# Patient Record
Sex: FEMALE | Race: WHITE | Employment: OTHER | URBAN - METROPOLITAN AREA
[De-identification: names, ages, dates, MRNs, and addresses within clinical notes are randomized per-mention and may not be internally consistent; named-entity substitution may affect disease eponyms.]

---

## 2017-07-22 PROBLEM — K55.1 SUPERIOR MESENTERIC ARTERY STENOSIS (HCC): Status: ACTIVE | Noted: 2017-07-22

## 2017-07-22 PROBLEM — K44.9 HIATAL HERNIA: Status: ACTIVE | Noted: 2017-07-22

## 2017-07-22 PROBLEM — E87.1 HYPONATREMIA: Status: ACTIVE | Noted: 2017-07-22

## 2017-07-22 PROBLEM — E07.9 THYROID DISEASE: Status: ACTIVE | Noted: 2017-07-22

## 2017-07-22 PROBLEM — H35.30 MACULAR DEGENERATION OF LEFT EYE: Status: ACTIVE | Noted: 2017-07-22

## 2017-07-22 PROBLEM — K57.92 DIVERTICULITIS: Status: ACTIVE | Noted: 2017-07-22

## 2017-07-22 PROBLEM — R35.0 URINARY FREQUENCY: Status: ACTIVE | Noted: 2017-07-22

## 2020-05-04 ENCOUNTER — APPOINTMENT (OUTPATIENT)
Dept: CT IMAGING | Age: 85
End: 2020-05-04
Payer: MEDICARE

## 2020-05-04 ENCOUNTER — HOSPITAL ENCOUNTER (EMERGENCY)
Age: 85
Discharge: HOME | End: 2020-05-05
Attending: EMERGENCY MEDICINE
Payer: MEDICARE

## 2020-05-04 VITALS
OXYGEN SATURATION: 98 % | BODY MASS INDEX: 25.72 KG/M2 | WEIGHT: 131 LBS | TEMPERATURE: 98.4 F | HEART RATE: 90 BPM | SYSTOLIC BLOOD PRESSURE: 166 MMHG | RESPIRATION RATE: 14 BRPM | HEIGHT: 60 IN | DIASTOLIC BLOOD PRESSURE: 81 MMHG

## 2020-05-04 PROCEDURE — 72125 CT NECK SPINE W/O DYE: CPT

## 2020-05-04 PROCEDURE — 90471 IMMUNIZATION ADMIN: CPT | Performed by: EMERGENCY MEDICINE

## 2020-05-04 PROCEDURE — 99283 EMERGENCY DEPT VISIT LOW MDM: CPT

## 2020-05-04 PROCEDURE — 70450 CT HEAD/BRAIN W/O DYE: CPT

## 2020-05-04 PROCEDURE — 90715 TDAP VACCINE 7 YRS/> IM: CPT | Performed by: EMERGENCY MEDICINE

## 2020-05-04 PROCEDURE — 6360000002 HC RX W HCPCS: Performed by: EMERGENCY MEDICINE

## 2020-05-04 RX ORDER — GINSENG 100 MG
CAPSULE ORAL ONCE
Status: DISCONTINUED | OUTPATIENT
Start: 2020-05-04 | End: 2020-05-05 | Stop reason: HOSPADM

## 2020-05-04 RX ADMIN — TETANUS TOXOID, REDUCED DIPHTHERIA TOXOID AND ACELLULAR PERTUSSIS VACCINE, ADSORBED 0.5 ML: 5; 2.5; 8; 8; 2.5 SUSPENSION INTRAMUSCULAR at 22:03

## 2020-05-04 ASSESSMENT — ENCOUNTER SYMPTOMS
SHORTNESS OF BREATH: 0
COUGH: 0
BACK PAIN: 0
EYE PAIN: 0
NAUSEA: 0
ABDOMINAL PAIN: 0
VOMITING: 0
CHEST TIGHTNESS: 0

## 2020-05-04 ASSESSMENT — PAIN DESCRIPTION - LOCATION: LOCATION: HEAD

## 2020-05-04 ASSESSMENT — PAIN SCALES - GENERAL: PAINLEVEL_OUTOF10: 6

## 2020-05-05 NOTE — ED NOTES
[facility] here to transport pt back to [facility].      Timmy Orlando RN  05/05/20 1527

## 2020-05-05 NOTE — ED NOTES
Discharge instructions reviewed without questions. No further needs voiced at this time. Patient discharged from ED to [facility] with [facility] in stable condition.        Doris Shaikh RN  05/05/20 0020       Doris Shaikh RN  05/05/20 0020

## 2020-05-05 NOTE — ED PROVIDER NOTES
Negative for pain and visual disturbance. Respiratory: Negative for cough, chest tightness and shortness of breath. Cardiovascular: Negative for chest pain. Gastrointestinal: Negative for abdominal pain, nausea and vomiting. Genitourinary: Negative for flank pain. Musculoskeletal: Negative for back pain, gait problem, neck pain and neck stiffness. Skin: Positive for wound (scalp only). Neurological: Positive for dizziness (chronic, no new changes today) and headaches (mild at site of trauma only). Negative for syncope, weakness, light-headedness and numbness. Psychiatric/Behavioral: Negative for confusion. Positives and Pertinent negatives as per HPI. PASTMEDICAL HISTORY     Past Medical History:   Diagnosis Date    Thyroid disease     TIA (transient ischemic attack) 1980's         SURGICAL HISTORY       Past Surgical History:   Procedure Laterality Date    CATARACT REMOVAL      OU    HYSTERECTOMY           CURRENT MEDICATIONS       Current Discharge Medication List      CONTINUE these medications which have NOT CHANGED    Details   Multiple Vitamins-Minerals (MULTIVITAMIN ADULTS PO) Take by mouth      levothyroxine (SYNTHROID) 100 MCG tablet Take 100 mcg by mouth every morning (before breakfast)             ALLERGIES     Sulfa antibiotics    FAMILY HISTORY       Family History   Problem Relation Age of Onset    Arthritis Mother     Hearing Loss Father     Arthritis Sister           SOCIAL HISTORY       Social History     Socioeconomic History    Marital status:      Spouse name: None    Number of children: None    Years of education: None    Highest education level: None   Occupational History    None   Social Needs    Financial resource strain: None    Food insecurity     Worry: None     Inability: None    Transportation needs     Medical: None     Non-medical: None   Tobacco Use    Smoking status: Never Smoker   Substance and Sexual Activity    Alcohol use:  No palpation of all joints      Skin:     General: Skin is warm and dry. Coloration: Skin is not jaundiced or pale. Findings: No bruising, erythema, lesion or rash. Neurological:      General: No focal deficit present. Mental Status: She is alert and oriented to person, place, and time. Mental status is at baseline. GCS: GCS eye subscore is 4. GCS verbal subscore is 5. GCS motor subscore is 6. Cranial Nerves: Cranial nerves are intact. No cranial nerve deficit, dysarthria or facial asymmetry. Sensory: Sensation is intact. No sensory deficit. Motor: Motor function is intact. No weakness, tremor, atrophy, abnormal muscle tone or seizure activity. Comments: Normal  strength bilaterally at 5 out of 5, normal leg extension and dorsiflexion and plantar flexion bilaterally with strength at 5 out of 5   Psychiatric:         Attention and Perception: Attention normal.         Mood and Affect: Mood normal.         Speech: Speech normal.         Behavior: Behavior normal. Behavior is cooperative. DIAGNOSTIC RESULTS   :    Labs Reviewed - No data to display    All other labs were within normal range or not returned asof this dictation. EKG: All EKG's are interpreted by the Emergency Department Physician who either signs or Co-signs this chart in the absence of a cardiologist.        RADIOLOGY:   Non-plain film images such as CT, Ultrasound and MRI are read by the radiologist. Plainradiographic images are visualized and preliminarily interpreted by the  ED Provider with the belowfindings:        Interpretation per the Radiologist below, if available at the time of this note:    CT Head WO Contrast   Final Result   1. No acute intracranial abnormality. 2. Left parietal scalp hematoma. No underlying fracture. 3. Diffuse parenchymal volume loss with moderate chronic white matter   microvascular ischemic changes.          CT Cervical Spine WO Contrast   Final Result   No acute osseous abnormality of the cervical spine. PROCEDURES   Unless otherwise noted below, none     Procedures    CRITICAL CARE TIME   N/A    CONSULTS:  None    EMERGENCY DEPARTMENT COURSE and DIFFERENTIAL DIAGNOSIS/MDM:   Vitals:    Vitals:    05/04/20 1958   BP: (!) 173/89   Pulse: 86   Resp: 16   Temp: 98.4 °F (36.9 °C)   TempSrc: Oral   SpO2: 98%   Weight: 131 lb (59.4 kg)   Height: 5' (1.524 m)       Patient was given the following medications:  Medications   bacitracin ointment (has no administration in time range)   Tetanus-Diphth-Acell Pertussis (BOOSTRIX) injection 0.5 mL (0.5 mLs Intramuscular Given 5/4/20 2203)     Patient was evaluated due to concern for mechanical fall after tripping and subsequently having trauma to her scalp. She has a mild headache at this time mainly at the site of her contusion. CT of the head and cervical spine did not show any intracranial hemorrhage or C-spine fracture. She denied any chest pain or shortness of breath but has had some heartburn over the last 2 weeks although she is aware it could be a cardiac etiology causing this and she states she is not worried about the heartburn and does not want this worked up at this time and just wants to make sure her head is okay. She understands if it was a cardiac issue it could cause severe problems but she is not worried about this and is willing to accept this risk. She has full mental capacity to make her own medical decisions. After her scalp was cleaned up, there was an abrasion noted but no laceration that needed repair and it stopped bleeding. Her Tdap was updated. Otherwise, she was ambulatory without difficulty. She knows to return to the emergency department if she develops any severe headache, vomiting, confusion, or would like her chest discomfort worked up for further evaluation, but otherwise follow-up with primary doctor as needed over the next few days.   She was well-appearing and in no acute distress at time of discharge and felt comfortable with this plan. The patient tolerated their visit well. The patient and / or the family were informed of the results of any tests, a time was given to answer questions. FINAL IMPRESSION      1. Fall on same level from slipping, tripping or stumbling, initial encounter    2. Contusion of scalp, initial encounter    3.  Need for Tdap vaccination          DISPOSITION/PLAN   DISPOSITION Decision To Discharge 05/04/2020 10:03:21 PM      PATIENT REFERRED TO:  [facility]  ED  [address] [facility] [address] [phone]  Go to   If symptoms worsen    Sharda Guerrero MD  [address] [place] [address]    Schedule an appointment as soon as possible for a visit in 3 days        DISCHARGEMEDICATIONS:  Current Discharge Medication List          DISCONTINUED MEDICATIONS:  Current Discharge Medication List                 (Please note that portions of this note were completed with a voicerecognition program.  Efforts were made to edit the dictations but occasionally words are mis-transcribed.)    Bhumi Hays MD (electronically signed)            Bhumi Hays MD  05/04/20 9665

## 2020-05-05 NOTE — ED NOTES
Patient assisted to [facility], unable to go at this time. To CT via stretcher.      Doris Shaikh RN  05/04/20 2052

## 2021-01-06 ENCOUNTER — HOSPITAL ENCOUNTER (EMERGENCY)
Age: 86
Discharge: HOME | End: 2021-01-06
Payer: MEDICARE

## 2021-01-06 VITALS
SYSTOLIC BLOOD PRESSURE: 109 MMHG | WEIGHT: 131 LBS | BODY MASS INDEX: 21.83 KG/M2 | TEMPERATURE: 97.1 F | HEIGHT: 65 IN | HEART RATE: 89 BPM | RESPIRATION RATE: 18 BRPM | OXYGEN SATURATION: 100 % | DIASTOLIC BLOOD PRESSURE: 106 MMHG

## 2021-01-06 DIAGNOSIS — R39.15 URINARY URGENCY: Primary | ICD-10-CM

## 2021-01-06 LAB
A/G RATIO: 1.3 (ref 1.1–2.2)
ALBUMIN SERPL-MCNC: 4.3 G/DL (ref 3.4–5)
ALP BLD-CCNC: 113 U/L (ref 40–129)
ALT SERPL-CCNC: 11 U/L (ref 10–40)
ANION GAP SERPL CALCULATED.3IONS-SCNC: 12 MMOL/L (ref 3–16)
AST SERPL-CCNC: 20 U/L (ref 15–37)
BASOPHILS ABSOLUTE: 0 K/UL (ref 0–0.2)
BASOPHILS RELATIVE PERCENT: 0.7 %
BILIRUB SERPL-MCNC: 0.3 MG/DL (ref 0–1)
BILIRUBIN URINE: NEGATIVE
BLOOD, URINE: NEGATIVE
BUN BLDV-MCNC: 27 MG/DL (ref 7–20)
CALCIUM SERPL-MCNC: 9.5 MG/DL (ref 8.3–10.6)
CHLORIDE BLD-SCNC: 99 MMOL/L (ref 99–110)
CLARITY: CLEAR
CO2: 23 MMOL/L (ref 21–32)
COLOR: YELLOW
CREAT SERPL-MCNC: 0.8 MG/DL (ref 0.6–1.2)
EOSINOPHILS ABSOLUTE: 0.2 K/UL (ref 0–0.6)
EOSINOPHILS RELATIVE PERCENT: 4 %
GFR AFRICAN AMERICAN: >60
GFR NON-AFRICAN AMERICAN: >60
GLOBULIN: 3.3 G/DL
GLUCOSE BLD-MCNC: 89 MG/DL (ref 70–99)
GLUCOSE URINE: NEGATIVE MG/DL
HCT VFR BLD CALC: 39.3 % (ref 36–48)
HEMOGLOBIN: 12.8 G/DL (ref 12–16)
KETONES, URINE: NEGATIVE MG/DL
LEUKOCYTE ESTERASE, URINE: NEGATIVE
LYMPHOCYTES ABSOLUTE: 0.6 K/UL (ref 1–5.1)
LYMPHOCYTES RELATIVE PERCENT: 14.8 %
MCH RBC QN AUTO: 31.9 PG (ref 26–34)
MCHC RBC AUTO-ENTMCNC: 32.6 G/DL (ref 31–36)
MCV RBC AUTO: 98 FL (ref 80–100)
MICROSCOPIC EXAMINATION: NORMAL
MONOCYTES ABSOLUTE: 0.4 K/UL (ref 0–1.3)
MONOCYTES RELATIVE PERCENT: 8.9 %
NEUTROPHILS ABSOLUTE: 2.9 K/UL (ref 1.7–7.7)
NEUTROPHILS RELATIVE PERCENT: 71.6 %
NITRITE, URINE: NEGATIVE
PDW BLD-RTO: 14.7 % (ref 12.4–15.4)
PH UA: 6 (ref 5–8)
PLATELET # BLD: 201 K/UL (ref 135–450)
PMV BLD AUTO: 8 FL (ref 5–10.5)
POTASSIUM SERPL-SCNC: 4.4 MMOL/L (ref 3.5–5.1)
PROTEIN UA: NEGATIVE MG/DL
RBC # BLD: 4.01 M/UL (ref 4–5.2)
SODIUM BLD-SCNC: 134 MMOL/L (ref 136–145)
SPECIFIC GRAVITY UA: 1.01 (ref 1–1.03)
TOTAL PROTEIN: 7.6 G/DL (ref 6.4–8.2)
URINE REFLEX TO CULTURE: NORMAL
URINE TYPE: NORMAL
UROBILINOGEN, URINE: 0.2 E.U./DL
WBC # BLD: 4.1 K/UL (ref 4–11)

## 2021-01-06 PROCEDURE — 81003 URINALYSIS AUTO W/O SCOPE: CPT

## 2021-01-06 PROCEDURE — 80053 COMPREHEN METABOLIC PANEL: CPT

## 2021-01-06 PROCEDURE — 51701 INSERT BLADDER CATHETER: CPT

## 2021-01-06 PROCEDURE — 99283 EMERGENCY DEPT VISIT LOW MDM: CPT

## 2021-01-06 PROCEDURE — 85025 COMPLETE CBC W/AUTO DIFF WBC: CPT

## 2021-01-06 PROCEDURE — 51798 US URINE CAPACITY MEASURE: CPT

## 2021-01-06 NOTE — ED NOTES
Conducted a bladder scan to determine volume of urine being retained by the pt. Bladder scan revealed approx 305 mL of urine being retained. Pt was placed on a bed pan, however was unable to urinate. Proceeded to insert a straight catheter using sterile technique in order to obtain a clean urine sample as well as drain the bladder to relieve pt of pressure. At this point the catheter emptied 300 mL of urine. Post-void bladder scan revealed 0 mL of urine in the pt's bladder.       Heriberto Scanlon  01/06/21 9059

## 2021-01-06 NOTE — ED PROVIDER NOTES
Relationship status: Not on file    Intimate partner violence     Fear of current or ex partner: Not on file     Emotionally abused: Not on file     Physically abused: Not on file     Forced sexual activity: Not on file   Other Topics Concern    Not on file   Social History Narrative    Not on file     No current facility-administered medications for this encounter. Current Outpatient Medications   Medication Sig Dispense Refill    Multiple Vitamins-Minerals (MULTIVITAMIN ADULTS PO) Take by mouth      levothyroxine (SYNTHROID) 100 MCG tablet Take 100 mcg by mouth every morning (before breakfast)       Allergies   Allergen Reactions    Sulfa Antibiotics Rash       REVIEW OF SYSTEMS  10 systems reviewed, pertinent positives per HPI otherwise noted to be negative    PHYSICAL EXAM  BP (!) 180/78   Pulse 90   Temp 97.1 °F (36.2 °C) (Oral)   Resp 18   Ht 5' 5\" (1.651 m)   Wt 131 lb (59.4 kg)   SpO2 97%   BMI 21.80 kg/m²   GENERAL APPEARANCE: Awake and alert. Cooperative. No acute distress. Vital signs are stable. Well appearing and non toxic. HEAD: Normocephalic. Atraumatic. EYES: PERRL. EOM's grossly intact. ENT: Mucous membranes are moist.   NECK: Supple. Normal ROM. HEART: RRR. Distal pulses are equal and intact. Cap refill less than 2 seconds. LUNGS: Respirations unlabored. CTAB. Good air exchange. Speaking comfortably in full sentences. No wheezing, rhonchi, rales, stridor. ABDOMEN: Soft. Non-distended. Non-tender. No guarding or rebound. No masses. No organomegaly. No rigidity. Bowel sounds are present. Negative manzo's. Negative McBurney's point. Negative CVA tenderness. EXTREMITIES: No peripheral edema. Moves all extremities equally. All extremities neurovascularly intact. SKIN: Warm and dry. No acute rashes. NEUROLOGICAL: Alert and oriented. No gross facial drooping. Strength 5/5, sensation intact. PSYCHIATRIC: Normal mood and affect.       ED COURSE/MDM  Patient seen and evaluated. Old records reviewed. Diagnostic testing reviewed and results discussed. I have independently evaluated this patient based upon my scope of practice. Supervising physician was in the department for consultation as needed. Yesika Humphreys presented to the ED today with above noted complaints. Bladder scan volume is 300 prevoid. Patient able to void 200 mL by herself. Urinalysis negative for infection or hematuria. CBC and CMP unremarkable no acute kidney injury or leukocytosis. I discussed the above findings with patient's son who reports that this is a chronic issue for the patient and that she is already on oxybutynin and has seen multiple specialist.  I discussed with son to follow-up with her specialist for further management and to monitor for fever and worsening symptoms. Son agreeable with this plan. At this point I do not feel the patient requires further work up and it is reasonable to discharge the patient. A discussion was had with the patient and/or their surrogate regarding diagnosis, diagnostic testing results, treatment/ plan of care, and follow up. There was shared decision-making between myself as well as the patient and/or their surrogate and we are all in agreement with discharge home. There was an opportunity for questions and all questions were answered to the best of my ability and to the satisfaction of the patient and/or patient family. Patient will follow up with urology for further evaluation/treatment. The patient was given strict return precautions as we discussed symptoms that would necessitate return to the ED. Patient will return to ED for new/worsening symptoms. The patient verbalized their understanding and agreement with the above plan. Please refer to AVS for further details regarding discharge instructions.       Results for orders placed or performed during the hospital encounter of 01/06/21   CBC auto differential   Result Value Ref Range WBC 4.1 4.0 - 11.0 K/uL    RBC 4.01 4.00 - 5.20 M/uL    Hemoglobin 12.8 12.0 - 16.0 g/dL    Hematocrit 39.3 36.0 - 48.0 %    MCV 98.0 80.0 - 100.0 fL    MCH 31.9 26.0 - 34.0 pg    MCHC 32.6 31.0 - 36.0 g/dL    RDW 14.7 12.4 - 15.4 %    Platelets 167 842 - 458 K/uL    MPV 8.0 5.0 - 10.5 fL    Neutrophils % 71.6 %    Lymphocytes % 14.8 %    Monocytes % 8.9 %    Eosinophils % 4.0 %    Basophils % 0.7 %    Neutrophils Absolute 2.9 1.7 - 7.7 K/uL    Lymphocytes Absolute 0.6 (L) 1.0 - 5.1 K/uL    Monocytes Absolute 0.4 0.0 - 1.3 K/uL    Eosinophils Absolute 0.2 0.0 - 0.6 K/uL    Basophils Absolute 0.0 0.0 - 0.2 K/uL   Comprehensive metabolic panel   Result Value Ref Range    Sodium 134 (L) 136 - 145 mmol/L    Potassium 4.4 3.5 - 5.1 mmol/L    Chloride 99 99 - 110 mmol/L    CO2 23 21 - 32 mmol/L    Anion Gap 12 3 - 16    Glucose 89 70 - 99 mg/dL    BUN 27 (H) 7 - 20 mg/dL    CREATININE 0.8 0.6 - 1.2 mg/dL    GFR Non-African American >60 >60    GFR African American >60 >60    Calcium 9.5 8.3 - 10.6 mg/dL    Total Protein 7.6 6.4 - 8.2 g/dL    Alb 4.3 3.4 - 5.0 g/dL    Albumin/Globulin Ratio 1.3 1.1 - 2.2    Total Bilirubin 0.3 0.0 - 1.0 mg/dL    Alkaline Phosphatase 113 40 - 129 U/L    ALT 11 10 - 40 U/L    AST 20 15 - 37 U/L    Globulin 3.3 g/dL   Urine, reflex to culture    Specimen: Urine, clean catch   Result Value Ref Range    Color, UA Yellow Straw/Yellow    Clarity, UA Clear Clear    Glucose, Ur Negative Negative mg/dL    Bilirubin Urine Negative Negative    Ketones, Urine Negative Negative mg/dL    Specific Gravity, UA 1.015 1.005 - 1.030    Blood, Urine Negative Negative    pH, UA 6.0 5.0 - 8.0    Protein, UA Negative Negative mg/dL    Urobilinogen, Urine 0.2 <2.0 E.U./dL    Nitrite, Urine Negative Negative    Leukocyte Esterase, Urine Negative Negative    Microscopic Examination Not Indicated     Urine Type NotGiven     Urine Reflex to Culture Not Indicated        I estimate there is LOW risk for ACUTE APPENDICITIS, BOWEL OBSTRUCTION, CHOLECYSTITIS, DIVERTICULITIS, INCARCERATED HERNIA, PANCREATITIS, PELVIC INFLAMMATORY DISEASE, PERFORATED BOWEL or ULCER, PREGNANCY, or TUBO-OVARIAN ABSCESS, thus I consider the discharge disposition reasonable. Also, there is no evidence or peritonitis, sepsis, or toxicity. Chris Peres and I have discussed the diagnosis and risks, and we agree with discharging home to follow-up with their primary doctor. We also discussed returning to the Emergency Department immediately if new or worsening symptoms occur. We have discussed the symptoms which are most concerning (e.g., bloody stool, fever, changing or worsening pain, vomiting) that necessitate immediate return. Final Impression    1. Urinary urgency        Blood pressure (!) 180/78, pulse 90, temperature 97.1 °F (36.2 °C), temperature source Oral, resp. rate 18, height 5' 5\" (1.651 m), weight 131 lb (59.4 kg), SpO2 97 %. mdm    Patient was sent home with a prescription for below medication/s. I did [place] patient on appropriate use of these medication. New Prescriptions    No medications on file           FOLLOW UP  [facility] [place] [address]  [address] [place]  ED  [address] [place] [id number] [phone]          DISPOSITION  Patient was discharged to home in good condition. Comment: Please note this report has been produced using speech recognition software and may contain errors related to that system including errors in grammar, punctuation, and spelling, as well as words and phrases that may be inappropriate. If there are any questions or concerns please feel free to contact the dictating provider for clarification.             LUCIANA Coley - HARSHAL  01/06/21 3905

## 2022-03-12 ENCOUNTER — APPOINTMENT (OUTPATIENT)
Dept: GENERAL RADIOLOGY | Age: 87
End: 2022-03-12
Payer: MEDICARE

## 2022-03-12 ENCOUNTER — APPOINTMENT (OUTPATIENT)
Dept: CT IMAGING | Age: 87
End: 2022-03-12
Payer: MEDICARE

## 2022-03-12 ENCOUNTER — HOSPITAL ENCOUNTER (OUTPATIENT)
Age: 87
Setting detail: OBSERVATION
Discharge: NURSING, REHAB OR LONG-TERM CARE | End: 2022-03-15
Attending: EMERGENCY MEDICINE | Admitting: FAMILY MEDICINE
Payer: MEDICARE

## 2022-03-12 DIAGNOSIS — R53.1 GENERALIZED WEAKNESS: Primary | ICD-10-CM

## 2022-03-12 DIAGNOSIS — R29.6 FREQUENT FALLS: ICD-10-CM

## 2022-03-12 LAB
A/G RATIO: 1.5 (ref 1.1–2.2)
ALBUMIN SERPL-MCNC: 4.2 G/DL (ref 3.4–5)
ALP BLD-CCNC: 226 U/L (ref 40–129)
ALT SERPL-CCNC: 12 U/L (ref 10–40)
ANION GAP SERPL CALCULATED.3IONS-SCNC: 12 MMOL/L (ref 3–16)
AST SERPL-CCNC: 30 U/L (ref 15–37)
BASE EXCESS VENOUS: -2.4 MMOL/L (ref -3–3)
BASOPHILS ABSOLUTE: 0 K/UL (ref 0–0.2)
BASOPHILS RELATIVE PERCENT: 0.7 %
BILIRUB SERPL-MCNC: <0.2 MG/DL (ref 0–1)
BILIRUBIN URINE: NEGATIVE
BLOOD, URINE: NEGATIVE
BUN BLDV-MCNC: 28 MG/DL (ref 7–20)
CALCIUM SERPL-MCNC: 9.2 MG/DL (ref 8.3–10.6)
CARBOXYHEMOGLOBIN: 5.4 % (ref 0–1.5)
CHLORIDE BLD-SCNC: 101 MMOL/L (ref 99–110)
CLARITY: CLEAR
CO2: 21 MMOL/L (ref 21–32)
COLOR: YELLOW
CREAT SERPL-MCNC: 0.6 MG/DL (ref 0.6–1.2)
EOSINOPHILS ABSOLUTE: 0 K/UL (ref 0–0.6)
EOSINOPHILS RELATIVE PERCENT: 0.6 %
GFR AFRICAN AMERICAN: >60
GFR NON-AFRICAN AMERICAN: >60
GLUCOSE BLD-MCNC: 136 MG/DL (ref 70–99)
GLUCOSE URINE: NEGATIVE MG/DL
HCO3 VENOUS: 21.7 MMOL/L (ref 23–29)
HCT VFR BLD CALC: 36.7 % (ref 36–48)
HEMOGLOBIN: 12 G/DL (ref 12–16)
KETONES, URINE: NEGATIVE MG/DL
LACTIC ACID: 1.2 MMOL/L (ref 0.4–2)
LEUKOCYTE ESTERASE, URINE: NEGATIVE
LYMPHOCYTES ABSOLUTE: 0.4 K/UL (ref 1–5.1)
LYMPHOCYTES RELATIVE PERCENT: 8.6 %
MAGNESIUM: 2.2 MG/DL (ref 1.8–2.4)
MCH RBC QN AUTO: 31.4 PG (ref 26–34)
MCHC RBC AUTO-ENTMCNC: 32.6 G/DL (ref 31–36)
MCV RBC AUTO: 96.3 FL (ref 80–100)
METHEMOGLOBIN VENOUS: 0.3 %
MICROSCOPIC EXAMINATION: NORMAL
MONOCYTES ABSOLUTE: 0.3 K/UL (ref 0–1.3)
MONOCYTES RELATIVE PERCENT: 7 %
NEUTROPHILS ABSOLUTE: 3.6 K/UL (ref 1.7–7.7)
NEUTROPHILS RELATIVE PERCENT: 83.1 %
NITRITE, URINE: NEGATIVE
O2 SAT, VEN: 88 %
O2 THERAPY: ABNORMAL
PCO2, VEN: 35.5 MMHG (ref 40–50)
PDW BLD-RTO: 15.4 % (ref 12.4–15.4)
PH UA: 6 (ref 5–8)
PH VENOUS: 7.4 (ref 7.35–7.45)
PLATELET # BLD: 241 K/UL (ref 135–450)
PMV BLD AUTO: 7.2 FL (ref 5–10.5)
PO2, VEN: 51.2 MMHG (ref 25–40)
POTASSIUM SERPL-SCNC: 4.9 MMOL/L (ref 3.5–5.1)
PRO-BNP: 952 PG/ML (ref 0–449)
PROTEIN UA: NEGATIVE MG/DL
RBC # BLD: 3.82 M/UL (ref 4–5.2)
SODIUM BLD-SCNC: 134 MMOL/L (ref 136–145)
SPECIFIC GRAVITY UA: 1.02 (ref 1–1.03)
TCO2 CALC VENOUS: 23 MMOL/L
TOTAL CK: 116 U/L (ref 26–192)
TOTAL PROTEIN: 7 G/DL (ref 6.4–8.2)
TROPONIN: <0.01 NG/ML
URINE REFLEX TO CULTURE: NORMAL
URINE TYPE: NORMAL
UROBILINOGEN, URINE: 0.2 E.U./DL
WBC # BLD: 4.3 K/UL (ref 4–11)

## 2022-03-12 PROCEDURE — 82803 BLOOD GASES ANY COMBINATION: CPT

## 2022-03-12 PROCEDURE — 84443 ASSAY THYROID STIM HORMONE: CPT

## 2022-03-12 PROCEDURE — 83880 ASSAY OF NATRIURETIC PEPTIDE: CPT

## 2022-03-12 PROCEDURE — 74177 CT ABD & PELVIS W/CONTRAST: CPT

## 2022-03-12 PROCEDURE — 36415 COLL VENOUS BLD VENIPUNCTURE: CPT

## 2022-03-12 PROCEDURE — 6360000004 HC RX CONTRAST MEDICATION: Performed by: EMERGENCY MEDICINE

## 2022-03-12 PROCEDURE — 82550 ASSAY OF CK (CPK): CPT

## 2022-03-12 PROCEDURE — 51701 INSERT BLADDER CATHETER: CPT

## 2022-03-12 PROCEDURE — 93005 ELECTROCARDIOGRAM TRACING: CPT | Performed by: EMERGENCY MEDICINE

## 2022-03-12 PROCEDURE — 84484 ASSAY OF TROPONIN QUANT: CPT

## 2022-03-12 PROCEDURE — 81003 URINALYSIS AUTO W/O SCOPE: CPT

## 2022-03-12 PROCEDURE — 83735 ASSAY OF MAGNESIUM: CPT

## 2022-03-12 PROCEDURE — 99284 EMERGENCY DEPT VISIT MOD MDM: CPT

## 2022-03-12 PROCEDURE — 85025 COMPLETE CBC W/AUTO DIFF WBC: CPT

## 2022-03-12 PROCEDURE — 83605 ASSAY OF LACTIC ACID: CPT

## 2022-03-12 PROCEDURE — G0378 HOSPITAL OBSERVATION PER HR: HCPCS

## 2022-03-12 PROCEDURE — 80053 COMPREHEN METABOLIC PANEL: CPT

## 2022-03-12 PROCEDURE — 71045 X-RAY EXAM CHEST 1 VIEW: CPT

## 2022-03-12 PROCEDURE — 70450 CT HEAD/BRAIN W/O DYE: CPT

## 2022-03-12 RX ORDER — POLYETHYLENE GLYCOL 3350 17 G/17G
17 POWDER, FOR SOLUTION ORAL DAILY PRN
Status: DISCONTINUED | OUTPATIENT
Start: 2022-03-12 | End: 2022-03-15 | Stop reason: HOSPADM

## 2022-03-12 RX ORDER — ONDANSETRON 4 MG/1
4 TABLET, ORALLY DISINTEGRATING ORAL EVERY 8 HOURS PRN
Status: DISCONTINUED | OUTPATIENT
Start: 2022-03-12 | End: 2022-03-15 | Stop reason: HOSPADM

## 2022-03-12 RX ORDER — SODIUM CHLORIDE 9 MG/ML
25 INJECTION, SOLUTION INTRAVENOUS PRN
Status: DISCONTINUED | OUTPATIENT
Start: 2022-03-12 | End: 2022-03-15 | Stop reason: HOSPADM

## 2022-03-12 RX ORDER — HYDRALAZINE HYDROCHLORIDE 20 MG/ML
10 INJECTION INTRAMUSCULAR; INTRAVENOUS ONCE
Status: COMPLETED | OUTPATIENT
Start: 2022-03-12 | End: 2022-03-13

## 2022-03-12 RX ORDER — ONDANSETRON 2 MG/ML
4 INJECTION INTRAMUSCULAR; INTRAVENOUS EVERY 6 HOURS PRN
Status: DISCONTINUED | OUTPATIENT
Start: 2022-03-12 | End: 2022-03-15 | Stop reason: HOSPADM

## 2022-03-12 RX ORDER — SODIUM CHLORIDE 0.9 % (FLUSH) 0.9 %
5-40 SYRINGE (ML) INJECTION EVERY 12 HOURS SCHEDULED
Status: DISCONTINUED | OUTPATIENT
Start: 2022-03-13 | End: 2022-03-15 | Stop reason: HOSPADM

## 2022-03-12 RX ORDER — HYDROCODONE BITARTRATE AND ACETAMINOPHEN 5; 325 MG/1; MG/1
1 TABLET ORAL EVERY 6 HOURS PRN
Status: DISCONTINUED | OUTPATIENT
Start: 2022-03-12 | End: 2022-03-13

## 2022-03-12 RX ORDER — ACETAMINOPHEN 650 MG/1
650 SUPPOSITORY RECTAL EVERY 6 HOURS PRN
Status: DISCONTINUED | OUTPATIENT
Start: 2022-03-12 | End: 2022-03-15 | Stop reason: HOSPADM

## 2022-03-12 RX ORDER — ACETAMINOPHEN 325 MG/1
650 TABLET ORAL EVERY 6 HOURS PRN
Status: DISCONTINUED | OUTPATIENT
Start: 2022-03-12 | End: 2022-03-15 | Stop reason: HOSPADM

## 2022-03-12 RX ORDER — SODIUM CHLORIDE 0.9 % (FLUSH) 0.9 %
5-40 SYRINGE (ML) INJECTION PRN
Status: DISCONTINUED | OUTPATIENT
Start: 2022-03-12 | End: 2022-03-15 | Stop reason: HOSPADM

## 2022-03-12 RX ORDER — MORPHINE SULFATE 2 MG/ML
2 INJECTION, SOLUTION INTRAMUSCULAR; INTRAVENOUS EVERY 4 HOURS PRN
Status: DISCONTINUED | OUTPATIENT
Start: 2022-03-12 | End: 2022-03-15 | Stop reason: HOSPADM

## 2022-03-12 RX ORDER — LEVOTHYROXINE SODIUM 0.1 MG/1
100 TABLET ORAL
Status: DISCONTINUED | OUTPATIENT
Start: 2022-03-13 | End: 2022-03-15 | Stop reason: HOSPADM

## 2022-03-12 RX ADMIN — IOPAMIDOL 75 ML: 755 INJECTION, SOLUTION INTRAVENOUS at 20:34

## 2022-03-12 ASSESSMENT — PAIN SCALES - GENERAL: PAINLEVEL_OUTOF10: 0

## 2022-03-13 LAB
ANION GAP SERPL CALCULATED.3IONS-SCNC: 11 MMOL/L (ref 3–16)
BASOPHILS ABSOLUTE: 0 K/UL (ref 0–0.2)
BASOPHILS RELATIVE PERCENT: 0.7 %
BUN BLDV-MCNC: 18 MG/DL (ref 7–20)
CALCIUM SERPL-MCNC: 9.2 MG/DL (ref 8.3–10.6)
CHLORIDE BLD-SCNC: 102 MMOL/L (ref 99–110)
CO2: 24 MMOL/L (ref 21–32)
CREAT SERPL-MCNC: <0.5 MG/DL (ref 0.6–1.2)
EKG ATRIAL RATE: 71 BPM
EKG DIAGNOSIS: NORMAL
EKG P AXIS: 57 DEGREES
EKG P-R INTERVAL: 190 MS
EKG Q-T INTERVAL: 410 MS
EKG QRS DURATION: 92 MS
EKG QTC CALCULATION (BAZETT): 445 MS
EKG R AXIS: -8 DEGREES
EKG T AXIS: 25 DEGREES
EKG VENTRICULAR RATE: 71 BPM
EOSINOPHILS ABSOLUTE: 0.1 K/UL (ref 0–0.6)
EOSINOPHILS RELATIVE PERCENT: 1.8 %
GFR AFRICAN AMERICAN: >60
GFR NON-AFRICAN AMERICAN: >60
GLUCOSE BLD-MCNC: 106 MG/DL (ref 70–99)
HCT VFR BLD CALC: 36.1 % (ref 36–48)
HEMOGLOBIN: 11.8 G/DL (ref 12–16)
LYMPHOCYTES ABSOLUTE: 0.5 K/UL (ref 1–5.1)
LYMPHOCYTES RELATIVE PERCENT: 11.7 %
MCH RBC QN AUTO: 31.1 PG (ref 26–34)
MCHC RBC AUTO-ENTMCNC: 32.7 G/DL (ref 31–36)
MCV RBC AUTO: 95 FL (ref 80–100)
MONOCYTES ABSOLUTE: 0.3 K/UL (ref 0–1.3)
MONOCYTES RELATIVE PERCENT: 6.8 %
NEUTROPHILS ABSOLUTE: 3.3 K/UL (ref 1.7–7.7)
NEUTROPHILS RELATIVE PERCENT: 79 %
PDW BLD-RTO: 15.2 % (ref 12.4–15.4)
PLATELET # BLD: 232 K/UL (ref 135–450)
PMV BLD AUTO: 7.3 FL (ref 5–10.5)
POTASSIUM REFLEX MAGNESIUM: 3.8 MMOL/L (ref 3.5–5.1)
RBC # BLD: 3.8 M/UL (ref 4–5.2)
SODIUM BLD-SCNC: 137 MMOL/L (ref 136–145)
TSH SERPL DL<=0.05 MIU/L-ACNC: 1.5 UIU/ML (ref 0.27–4.2)
WBC # BLD: 4.2 K/UL (ref 4–11)

## 2022-03-13 PROCEDURE — 80048 BASIC METABOLIC PNL TOTAL CA: CPT

## 2022-03-13 PROCEDURE — 6370000000 HC RX 637 (ALT 250 FOR IP): Performed by: INTERNAL MEDICINE

## 2022-03-13 PROCEDURE — 93010 ELECTROCARDIOGRAM REPORT: CPT | Performed by: INTERNAL MEDICINE

## 2022-03-13 PROCEDURE — 85025 COMPLETE CBC W/AUTO DIFF WBC: CPT

## 2022-03-13 PROCEDURE — 96375 TX/PRO/DX INJ NEW DRUG ADDON: CPT

## 2022-03-13 PROCEDURE — 97530 THERAPEUTIC ACTIVITIES: CPT

## 2022-03-13 PROCEDURE — 97535 SELF CARE MNGMENT TRAINING: CPT

## 2022-03-13 PROCEDURE — 97166 OT EVAL MOD COMPLEX 45 MIN: CPT

## 2022-03-13 PROCEDURE — 96372 THER/PROPH/DIAG INJ SC/IM: CPT

## 2022-03-13 PROCEDURE — G0378 HOSPITAL OBSERVATION PER HR: HCPCS

## 2022-03-13 PROCEDURE — 96376 TX/PRO/DX INJ SAME DRUG ADON: CPT

## 2022-03-13 PROCEDURE — 6370000000 HC RX 637 (ALT 250 FOR IP): Performed by: FAMILY MEDICINE

## 2022-03-13 PROCEDURE — 6360000002 HC RX W HCPCS: Performed by: NURSE PRACTITIONER

## 2022-03-13 PROCEDURE — 6360000002 HC RX W HCPCS: Performed by: FAMILY MEDICINE

## 2022-03-13 PROCEDURE — 2500000003 HC RX 250 WO HCPCS: Performed by: NURSE PRACTITIONER

## 2022-03-13 PROCEDURE — 96374 THER/PROPH/DIAG INJ IV PUSH: CPT

## 2022-03-13 PROCEDURE — 36415 COLL VENOUS BLD VENIPUNCTURE: CPT

## 2022-03-13 PROCEDURE — 97162 PT EVAL MOD COMPLEX 30 MIN: CPT

## 2022-03-13 PROCEDURE — 2580000003 HC RX 258: Performed by: FAMILY MEDICINE

## 2022-03-13 RX ORDER — MAGNESIUM GLUCONATE 27 MG(500)
500 TABLET ORAL DAILY
Status: DISCONTINUED | OUTPATIENT
Start: 2022-03-13 | End: 2022-03-14 | Stop reason: RX

## 2022-03-13 RX ORDER — ASPIRIN 81 MG/1
81 TABLET ORAL DAILY
Status: DISCONTINUED | OUTPATIENT
Start: 2022-03-13 | End: 2022-03-15 | Stop reason: HOSPADM

## 2022-03-13 RX ORDER — LANOLIN ALCOHOL/MO/W.PET/CERES
1000 CREAM (GRAM) TOPICAL DAILY
COMMUNITY

## 2022-03-13 RX ORDER — PANTOPRAZOLE SODIUM 40 MG/1
40 TABLET, DELAYED RELEASE ORAL 2 TIMES DAILY
COMMUNITY
Start: 2021-04-28

## 2022-03-13 RX ORDER — POLYETHYLENE GLYCOL 3350 17 G/17G
17 POWDER, FOR SOLUTION ORAL 2 TIMES DAILY
COMMUNITY

## 2022-03-13 RX ORDER — PANTOPRAZOLE SODIUM 40 MG/1
40 TABLET, DELAYED RELEASE ORAL 2 TIMES DAILY
Status: DISCONTINUED | OUTPATIENT
Start: 2022-03-13 | End: 2022-03-15 | Stop reason: HOSPADM

## 2022-03-13 RX ORDER — ASPIRIN 81 MG/1
81 TABLET ORAL DAILY
COMMUNITY

## 2022-03-13 RX ORDER — POLYETHYLENE GLYCOL 3350 17 G/17G
17 POWDER, FOR SOLUTION ORAL 2 TIMES DAILY
Status: DISCONTINUED | OUTPATIENT
Start: 2022-03-13 | End: 2022-03-15 | Stop reason: HOSPADM

## 2022-03-13 RX ORDER — METOPROLOL TARTRATE 5 MG/5ML
5 INJECTION INTRAVENOUS ONCE
Status: COMPLETED | OUTPATIENT
Start: 2022-03-13 | End: 2022-03-13

## 2022-03-13 RX ORDER — NYSTATIN 100000 U/G
CREAM TOPICAL 2 TIMES DAILY
COMMUNITY

## 2022-03-13 RX ORDER — CHOLECALCIFEROL (VITAMIN D3) 125 MCG
1000 CAPSULE ORAL DAILY
Status: DISCONTINUED | OUTPATIENT
Start: 2022-03-13 | End: 2022-03-15 | Stop reason: HOSPADM

## 2022-03-13 RX ORDER — OXYBUTYNIN CHLORIDE 5 MG/1
5 TABLET, EXTENDED RELEASE ORAL DAILY
COMMUNITY

## 2022-03-13 RX ORDER — MAGNESIUM GLUCONATE 27 MG(500)
500 TABLET ORAL DAILY
COMMUNITY

## 2022-03-13 RX ORDER — DOCUSATE SODIUM 100 MG/1
100 CAPSULE, LIQUID FILLED ORAL 2 TIMES DAILY
COMMUNITY

## 2022-03-13 RX ORDER — POLYETHYLENE GLYCOL 3350 17 G/17G
17 POWDER, FOR SOLUTION ORAL 2 TIMES DAILY
Status: DISCONTINUED | OUTPATIENT
Start: 2022-03-13 | End: 2022-03-13

## 2022-03-13 RX ORDER — DOCUSATE SODIUM 100 MG/1
100 CAPSULE, LIQUID FILLED ORAL 2 TIMES DAILY
Status: DISCONTINUED | OUTPATIENT
Start: 2022-03-13 | End: 2022-03-15 | Stop reason: HOSPADM

## 2022-03-13 RX ADMIN — ONDANSETRON 4 MG: 2 INJECTION INTRAMUSCULAR; INTRAVENOUS at 09:31

## 2022-03-13 RX ADMIN — METOPROLOL TARTRATE 5 MG: 1 INJECTION, SOLUTION INTRAVENOUS at 09:37

## 2022-03-13 RX ADMIN — ONDANSETRON 4 MG: 2 INJECTION INTRAMUSCULAR; INTRAVENOUS at 01:52

## 2022-03-13 RX ADMIN — PANTOPRAZOLE SODIUM 40 MG: 40 TABLET, DELAYED RELEASE ORAL at 16:57

## 2022-03-13 RX ADMIN — ENOXAPARIN SODIUM 40 MG: 40 INJECTION SUBCUTANEOUS at 08:29

## 2022-03-13 RX ADMIN — LEVOTHYROXINE SODIUM 100 MCG: 0.1 TABLET ORAL at 08:30

## 2022-03-13 RX ADMIN — HYDRALAZINE HYDROCHLORIDE 10 MG: 20 INJECTION INTRAMUSCULAR; INTRAVENOUS at 00:28

## 2022-03-13 RX ADMIN — CYANOCOBALAMIN TAB 500 MCG 1000 MCG: 500 TAB at 16:56

## 2022-03-13 RX ADMIN — PANTOPRAZOLE SODIUM 40 MG: 40 TABLET, DELAYED RELEASE ORAL at 19:24

## 2022-03-13 RX ADMIN — POLYETHYLENE GLYCOL 3350 17 G: 17 POWDER, FOR SOLUTION ORAL at 16:57

## 2022-03-13 RX ADMIN — POLYETHYLENE GLYCOL 3350 17 G: 17 POWDER, FOR SOLUTION ORAL at 19:24

## 2022-03-13 RX ADMIN — DOCUSATE SODIUM 100 MG: 100 CAPSULE, LIQUID FILLED ORAL at 19:24

## 2022-03-13 RX ADMIN — ASPIRIN 81 MG: 81 TABLET, COATED ORAL at 16:57

## 2022-03-13 RX ADMIN — DOCUSATE SODIUM 100 MG: 100 CAPSULE, LIQUID FILLED ORAL at 16:57

## 2022-03-13 RX ADMIN — SODIUM CHLORIDE, PRESERVATIVE FREE 10 ML: 5 INJECTION INTRAVENOUS at 08:30

## 2022-03-13 RX ADMIN — SODIUM CHLORIDE, PRESERVATIVE FREE 10 ML: 5 INJECTION INTRAVENOUS at 19:25

## 2022-03-13 ASSESSMENT — PAIN SCALES - GENERAL: PAINLEVEL_OUTOF10: 0

## 2022-03-13 NOTE — ED NOTES
Patient identified as a positive fall risk on the ED triage fall screening. Patient placed in fall precautions which includes:  yellow fall risk bracelet on wrist,patient wearing shoes, \"Be Safe\" sign placed on patient's door, and bed alarm placed under patient/alarm turned on. Patient instructed on importance of not getting out of bed or ambulating without assistance for safety.              Audelia Alonzo RN  03/12/22 2006

## 2022-03-13 NOTE — PLAN OF CARE
Bed in lowest position. Wheels locked. Bed check in place. Patient instructed to call before getting up. Will continue to monitor.

## 2022-03-13 NOTE — ED PROVIDER NOTES
CHIEF COMPLAINT  Fatigue (Pt to ED with c/o generalized weakness and fatigue for a few days)      HISTORY OF PRESENT ILLNESS  Elisabet De La Cruz is a 80 y.o. female with a history of hypothyroidism, hyponatremia, TIA who presents to the ED complaining of weakness. Patient reports worsening weakness and fatigue over the past week with multiple ground-level falls. She reports some nausea vomiting and diarrhea today however only a few episodes. States symptoms began prior to this. She denies headache, chest pain, dyspnea, abdominal pain, syncope, dysuria, hematochezia, melena. States she struck her head on the wall during a fall yesterday but did not lose consciousness. Denies taking blood thinners. No other complaints, modifying factors or associated symptoms. I have reviewed the following from the nursing documentation. Past Medical History:   Diagnosis Date    Thyroid disease     TIA (transient ischemic attack) 1980's     Past Surgical History:   Procedure Laterality Date    CATARACT REMOVAL      OU    HYSTERECTOMY       Family History   Problem Relation Age of Onset    Arthritis Mother     Hearing Loss Father     Arthritis Sister      Social History     Socioeconomic History    Marital status:       Spouse name: Not on file    Number of children: Not on file    Years of education: Not on file    Highest education level: Not on file   Occupational History    Not on file   Tobacco Use    Smoking status: Never Smoker    Smokeless tobacco: Never Used   Substance and Sexual Activity    Alcohol use: No    Drug use: No    Sexual activity: Never   Other Topics Concern    Not on file   Social History Narrative    Not on file     Social Determinants of Health     Financial Resource Strain:     Difficulty of Paying Living Expenses: Not on file   Food Insecurity:     Worried About Running Out of Food in the Last Year: Not on file    Sarbjit of Food in the Last Year: Not on file Transportation Needs:     Lack of Transportation (Medical): Not on file    Lack of Transportation (Non-Medical): Not on file   Physical Activity:     Days of Exercise per Week: Not on file    Minutes of Exercise per Session: Not on file   Stress:     Feeling of Stress : Not on file   Social Connections:     Frequency of Communication with Friends and Family: Not on file    Frequency of Social Gatherings with Friends and Family: Not on file    Attends Baptism Services: Not on file    Active Member of [address] [place] [address] [email or web address] or Organizations: Not on file    Attends Club or Organization Meetings: Not on file    Marital Status: Not on file   Intimate Partner Violence:     Fear of Current or Ex-Partner: Not on file    Emotionally Abused: Not on file    Physically Abused: Not on file    Sexually Abused: Not on file   Housing Stability:     Unable to Pay for Housing in the Last Year: Not on file    Number of [place] in the Last Year: Not on file    Unstable Housing in the Last Year: Not on file     No current facility-administered medications for this encounter. Current Outpatient Medications   Medication Sig Dispense Refill    Multiple Vitamins-Minerals (MULTIVITAMIN ADULTS PO) Take by mouth      levothyroxine (SYNTHROID) 100 MCG tablet Take 100 mcg by mouth every morning (before breakfast)       Allergies   Allergen Reactions    Sulfa Antibiotics Rash       REVIEW OF SYSTEMS  10 systems reviewed, pertinent positives per HPI otherwise noted to be negative. PHYSICAL EXAM  BP (!) 181/86   Pulse 77   Temp 97.3 °F (36.3 °C) (Oral)   Resp 18   Ht 5' 5\" (1.651 m)   Wt 120 lb (54.4 kg)   SpO2 96%   BMI 19.97 kg/m²   GENERAL APPEARANCE: Awake and alert. Cooperative. Appears comfortable and nontoxic. Very thin, frail, kyphotic. HEAD: Normocephalic. Atraumatic. EYES: PERRL. EOM's grossly intact. ENT: Mucous membranes are moist.  Very hard of hearing. NECK: Supple, trachea midline. HEART: RRR. Normal S1, S2. No murmurs, rubs or gallops. LUNGS: Respirations unlabored. CTAB. Good air exchange. No wheezes, rales, or rhonchi. Speaking comfortably in full sentences. ABDOMEN: Soft. Non-distended. Non-tender. No guarding or rebound. Normal Bowel sounds. EXTREMITIES: 1+ left lower extremity peripheral edema which patient states is chronic. MAEE. No acute deformities. SKIN: Warm and dry. No acute rashes. NEUROLOGICAL: Alert and interactive. CN II-XII intact. No gross facial drooping. Strength symmetrical.  Seems globally weak. PSYCHIATRIC: Normal mood and affect. LABS  I have reviewed all labs for this visit.    Results for orders placed or performed during the hospital encounter of 03/12/22   CBC with Auto Differential   Result Value Ref Range    WBC 4.3 4.0 - 11.0 K/uL    RBC 3.82 (L) 4.00 - 5.20 M/uL    Hemoglobin 12.0 12.0 - 16.0 g/dL    Hematocrit 36.7 36.0 - 48.0 %    MCV 96.3 80.0 - 100.0 fL    MCH 31.4 26.0 - 34.0 pg    MCHC 32.6 31.0 - 36.0 g/dL    RDW 15.4 12.4 - 15.4 %    Platelets 688 526 - 586 K/uL    MPV 7.2 5.0 - 10.5 fL    Neutrophils % 83.1 %    Lymphocytes % 8.6 %    Monocytes % 7.0 %    Eosinophils % 0.6 %    Basophils % 0.7 %    Neutrophils Absolute 3.6 1.7 - 7.7 K/uL    Lymphocytes Absolute 0.4 (L) 1.0 - 5.1 K/uL    Monocytes Absolute 0.3 0.0 - 1.3 K/uL    Eosinophils Absolute 0.0 0.0 - 0.6 K/uL    Basophils Absolute 0.0 0.0 - 0.2 K/uL   Comprehensive Metabolic Panel   Result Value Ref Range    Sodium 134 (L) 136 - 145 mmol/L    Potassium 4.9 3.5 - 5.1 mmol/L    Chloride 101 99 - 110 mmol/L    CO2 21 21 - 32 mmol/L    Anion Gap 12 3 - 16    Glucose 136 (H) 70 - 99 mg/dL    BUN 28 (H) 7 - 20 mg/dL    CREATININE 0.6 0.6 - 1.2 mg/dL    GFR Non-African American >60 >60    GFR African American >60 >60    Calcium 9.2 8.3 - 10.6 mg/dL    Total Protein 7.0 6.4 - 8.2 g/dL    Albumin 4.2 3.4 - 5.0 g/dL    Albumin/Globulin Ratio 1.5 1.1 - 2.2    Total Bilirubin <0.2 0.0 - 1.0 mg/dL Alkaline Phosphatase 226 (H) 40 - 129 U/L    ALT 12 10 - 40 U/L    AST 30 15 - 37 U/L   Magnesium   Result Value Ref Range    Magnesium 2.20 1.80 - 2.40 mg/dL   CK   Result Value Ref Range    Total  26 - 192 U/L   Troponin   Result Value Ref Range    Troponin <0.01 <0.01 ng/mL   Brain Natriuretic Peptide   Result Value Ref Range    Pro- (H) 0 - 449 pg/mL   Urinalysis with Reflex to Culture    Specimen: Urine   Result Value Ref Range    Color, UA Yellow Straw/Yellow    Clarity, UA Clear Clear    Glucose, Ur Negative Negative mg/dL    Bilirubin Urine Negative Negative    Ketones, Urine Negative Negative mg/dL    Specific Gravity, UA 1.020 1.005 - 1.030    Blood, Urine Negative Negative    pH, UA 6.0 5.0 - 8.0    Protein, UA Negative Negative mg/dL    Urobilinogen, Urine 0.2 <2.0 E.U./dL    Nitrite, Urine Negative Negative    Leukocyte Esterase, Urine Negative Negative    Microscopic Examination Not Indicated     Urine Type NotGiven     Urine Reflex to Culture Not Indicated    Blood Gas, Venous   Result Value Ref Range    pH, Mc 7.404 7.350 - 7.450    pCO2, Mc 35.5 (L) 40.0 - 50.0 mmHg    pO2, Mc 51.2 (H) 25.0 - 40.0 mmHg    HCO3, Venous 21.7 (L) 23.0 - 29.0 mmol/L    Base Excess, Mc -2.4 -3.0 - 3.0 mmol/L    O2 Sat, Mc 88 Not Established %    Carboxyhemoglobin 5.4 (H) 0.0 - 1.5 %    MetHgb, Mc 0.3 <1.5 %    TC02 (Calc), Mc 23 Not Established mmol/L    O2 Therapy Unknown    Lactic Acid   Result Value Ref Range    Lactic Acid 1.2 0.4 - 2.0 mmol/L   EKG 12 Lead   Result Value Ref Range    Ventricular Rate 71 BPM    Atrial Rate 71 BPM    P-R Interval 190 ms    QRS Duration 92 ms    Q-T Interval 410 ms    QTc Calculation (Bazett) 445 ms    P Axis 57 degrees    R Axis -8 degrees    T Axis 25 degrees    Diagnosis       Normal sinus rhythmNormal ECGWhen compared with ECG of 22-JUL-2017 17:01,Premature supraventricular complexes are no longer PresentNonspecific T wave abnormality now evident in Anterior leads       EKG  NSR, rate 71, leftward axis, QTC within normal limits, no acute ST or T wave changes from prior on July 22, 2017      RADIOLOGY  X-RAYS:  I have reviewed radiologic plain film image(s). ALL OTHER NON-PLAIN FILM IMAGES SUCH AS CT, ULTRASOUND AND MRI HAVE BEEN READ BY THE RADIOLOGIST. CT Head WO Contrast   Final Result   No acute intracranial abnormality. Stable chronic atrophic and ischemic white matter changes         CT ABDOMEN PELVIS W IV CONTRAST Additional Contrast? None   Final Result   Evaluation was limited due to streak beam hardening artifact throughout and   lack of intra-abdominal-intrapelvic fat. A moderate sized fixed para   esophageal hernia was noted. No renal mass or hydronephrosis was identified. No colitis or diverticulitis was seen. XR CHEST PORTABLE   Final Result      1. No acute cardiopulmonary abnormality. Rechecks: Physical assessment performed. Resting comfortably    ED COURSE/MDM  Patient seen and evaluated. Old records reviewed. Labs and imaging reviewed and results discussed with patient. Elderly and frail female here with generalized weakness and fatigue resulting in frequent falls. Family is concerned because at this point she is unable to even ambulate to the bathroom without falling over. Thus far work-up has been unremarkable. Plan for hospitalist admission for further testing as well as PT/OT eval and likely placement. New Prescriptions    No medications on file       CLINICAL IMPRESSION  1. Generalized weakness    2. Frequent falls        Blood pressure (!) 181/86, pulse 77, temperature 97.3 °F (36.3 °C), temperature source Oral, resp. rate 18, height 5' 5\" (1.651 m), weight 120 lb (54.4 kg), SpO2 96 %. DISPOSITION  Lucía Bottom was admitted in stable condition.         China Johansen MD  03/12/22 6035

## 2022-03-13 NOTE — PROGRESS NOTES
Occupational Therapy   Occupational Therapy Initial Assessment and Tx  Date: 3/13/2022   Patient Name: Rhiannon Berrios  MRN: [id number]     : 1920    Date of Service: 3/13/2022    Discharge Recommendations:  [address]  OT Equipment Recommendations  Equipment Needed: No  Other: defer    Assessment   Performance deficits / Impairments: Decreased functional mobility ; Decreased safe awareness;Decreased balance;Decreased coordination;Decreased ADL status; Decreased strength;Decreased endurance;Decreased cognition;Decreased posture  Assessment: Pt is 80 y.o female who presents to [facility] with progressive weakness and multiple recent falls. Pt reports PTA she was living at [facility] independently, completing ADLs and functional mobility to/from bathroom independently. Pt presents functioning significantly below baseline - demos decreased strength, activity tolerance, balance, safety awareness and ADLs. Pt requires Alex to maxA for functional t/fs. Pt is dependent for mobility requiring the use of the Sarastedy to complete mobility to/from bathroom. Pt completes ADLs with mod to maxA. Pt would benefit from ongoing skilled OT while in acute and at d.c. to address above deficits and maximize independence with functional mobility. Prognosis: Fair  Decision Making: Medium Complexity  OT Education: Transfer Training;OT Role;Plan of Care;ADL Adaptive Strategies  Patient Education: Disease specific: safety in hospital, OT role, d.c. rec, importance of activity, appropriate use of juan stedy.  Pt verbalized understanding  Barriers to Learning: poss cog and [place]  REQUIRES OT FOLLOW UP: Yes  Activity Tolerance  Activity Tolerance: Patient limited by fatigue  Activity Tolerance: Vitals after toileting in bed: /80 (RN made aware/present admin BP meds), HR 92, spO2 94%, BP after 5 mins lating 194/75, BP at end of session after meds 162/70  Safety Devices  Safety Devices in place: Yes  Type of devices: Nurse notified;Call light within reach; Left in bed;Bed alarm in place           Patient Diagnosis(es): The primary encounter diagnosis was Generalized weakness. A diagnosis of Frequent falls was also pertinent to this visit. has a past medical history of Thyroid disease and TIA (transient ischemic attack). has a past surgical history that includes Cataract removal and Hysterectomy. Restrictions  Restrictions/Precautions  Restrictions/Precautions: General Precautions,Fall Risk,Up as Tolerated  Position Activity Restriction  Other position/activity restrictions: AVASYS    Subjective   General  Chart Reviewed: Yes  Patient assessed for rehabilitation services?: Yes  Additional Pertinent Hx: Per H&P \"80 y.o. yo female who  has a past medical history of Thyroid disease and TIA (transient ischemic attack). hypothyroidism presents to [facility] ED complaining of generalized weakness for approximately 1 week, no inciting event, constant getting progressively worse, causing frequent falls in the last week\"; CXray 3/12 - neg.; CT Head 3/12 - neg.; CT abdomen 3/12 - limited study  Referring Practitioner: Luz Dawkins MD  Diagnosis: Weakness  Subjective  Subjective: Pt found supine in bed, Pt agreeable to OT eval/tx, pt states \"I need to get to the bathroom immediately\"  General Comment  Comments: RN clears for therapy  Patient Currently in Pain: Denies  Vital Signs  Patient Currently in Pain: Denies  Social/Functional History  Social/Functional History  Lives With: Alone  Type of Home: Assisted living ([facility])  Home Layout: One level  Home Access: Level entry  Bathroom Shower/Tub: Walk-in shower  Home Equipment: Rolling walker (transport chair)  [address] Help From: Other (comment) (\" i have a cleaning lady once a week. \")  ADL Assistance: Needs assistance  Homemaking Assistance: Needs assistance  Meal Prep: Total (\"I have my food delivered to my room. \")  Laundry: Total  Vacuuming: Total  Cleaning: Total  Homemaking Responsibilities: No  Ambulation Assistance: Independent (patient said that she was walking to her bathroom with her rolling walker. \"I just stayed in my room. \")  Transfer Assistance: Independent  Active : No  Additional Comments: Patient presented to the hospital status post fall. Patient said that she has fallen 3 times recently (within the past day or so). Patient is a poor historian so all information will need to be confirmed by patient's family/social work. Objective           Observation/Palpation  Posture: Poor  Observation: severe kyphosis. patient appears to potentially have signs of scoliosis as well. Balance  Sitting Balance: Contact guard assistance (moments of SBA)  Standing Balance: Minimal assistance (in [place])  Standing Balance  Time: 1 min 1x, 3 mins 1x, 15 secs 2x  Activity: functional transfers, toileting, grooming  Comment: in [place] - pt impulsive and trying to  stedy at all times confusion that she can sit on paddles despite explanation and cues  Functional Mobility  Functional - Mobility Device: Other Jocelyn garcia)  Activity: To/from bathroom  Assist Level: Dependent/Total  Toilet Transfers  Equipment Used: Standard toilet  Toilet Transfer: Maximum assistance  Toilet Transfers Comments: With juan garcia  ADL  Grooming: Minimal assistance; Increased time to complete (washing hands at sink in juan garcia)  LE Dressing: Moderate assistance; Increased time to complete  Toileting: Maximum assistance; Increased time to complete (toileting hygeine and clothing management)  Tone RUE  RUE Tone: Normotonic  Tone LUE  LUE Tone: Normotonic  Coordination  Movements Are Fluid And Coordinated: No  Coordination and Movement description: Decreased speed;Right UE;Left UE;Gross motor impairments        Transfers  Sit to stand: Minimal assistance  Stand to sit: Maximum assistance  Transfer Comments: with juan garcia     Cognition  Overall Cognitive Status: Exceptions  Arousal/Alertness: Delayed responses to stimuli (however this may be due to patient is hard of hearing)  Following Commands: Follows one step commands with increased time; Follows one step commands with repetition  Attention Span: Attends with cues to redirect  Memory: Decreased recall of recent events  Safety Judgement: Decreased awareness of need for safety;Decreased awareness of need for assistance  Problem Solving: Decreased awareness of errors  Insights: Decreased awareness of deficits  Initiation: Requires cues for some  Sequencing: Requires cues for some  Cognition Comment: patient is very impulsive.         Sensation  Overall Sensation Status: Impaired (numbness in left leg)        LUE AROM (degrees)  LUE AROM : WFL  Left Hand AROM (degrees)  Left Hand AROM: WFL  RUE AROM (degrees)  RUE AROM : WFL  Right Hand AROM (degrees)  Right Hand AROM: WFL  LUE Strength  Gross LUE Strength: WFL  RUE Strength  Gross RUE Strength: WFL                   Plan   Plan  Times per week: 3-5x  Current Treatment Recommendations: Endurance Training,Strengthening,Patient/Caregiver Education & Training,Balance Training,Functional Mobility Training,Safety Education & Training,Self-Care / ADL      AM-[facility] Score        -[facility] Inpatient Daily Activity Raw Score: 13 (03/13/22 1258)  -PAC Inpatient ADL T-Scale Score : 32.03 (03/13/22 1258)  ADL Inpatient CMS 0-100% Score: 63.03 (03/13/22 1258)  ADL Inpatient CMS G-Code Modifier : CL (03/13/22 1258)    Goals  Short term goals  Time Frame for Short term goals: 1 week (3/13/22)  Short term goal 1: Pt will complete toileting Alex  Short term goal 2: Pt will complete LB dressing Alex  Short term goal 3: Pt will complete toilet transfer Alex  Short term goal 4: Pt will increase stance tolerance to x6 mins for ADLs with CGA  Short term goal 5: Pt will tolerate BUE therex x15 for improved strength/endurance  Patient Goals   Patient goals : \"to get stronger\"       Therapy Time Individual Concurrent Group Co-treatment   Time In 0936         Time Out 0956         Minutes 20         Timed Code Treatment Minutes: 10 Minutes (+ 10 min eval)     If pt is unable to be seen after this session, please let this note serve as discharge summary. Please see case management note for discharge disposition. Thank you.     Shan Luke

## 2022-03-13 NOTE — H&P
[facility] ([facility]) Internal Medicine History and Physical    Chief Complaint   Patient presents with    Fatigue     Pt to ED with c/o generalized weakness and fatigue for a few days       HPI:  80 y.o. yo female who  has a past medical history of Thyroid disease and TIA (transient ischemic attack). hypothyroidism presents to [facility] ED complaining of generalized weakness for approximately 1 week, no inciting event, constant getting progressively worse, causing frequent falls in the last week. No exacerbating relieving factors. Moderately severe, generalized and nonradiating. Compliant with Synthroid. In the emergency department metabolic panel and hemogram are grossly unremarkable. Admit to observation for PT/OT and case management to evaluate for possible either home health or inpatient rehab placement. ROS:  A complete 14 point review of systems performed and is negative except as noted above. Past medical, surgical, family hx reviewed. Past Medical History:  Past Medical History:   Diagnosis Date    Thyroid disease     TIA (transient ischemic attack) 1980's         Surgical History:  Social History     Socioeconomic History    Marital status:       Spouse name: Not on file    Number of children: Not on file    Years of education: Not on file    Highest education level: Not on file   Occupational History    Not on file   Tobacco Use    Smoking status: Never Smoker    Smokeless tobacco: Never Used   Substance and Sexual Activity    Alcohol use: No    Drug use: No    Sexual activity: Never   Other Topics Concern    Not on file   Social History Narrative    Not on file     Social Determinants of Health     Financial Resource Strain:     Difficulty of Paying Living Expenses: Not on file   Food Insecurity:     Worried About Running Out of Food in the Last Year: Not on file    Sarbjit of Food in the Last Year: Not on file   Transportation Needs:     Lack of Transportation (Medical): Not on file    Lack of Transportation (Non-Medical): Not on file   Physical Activity:     Days of Exercise per Week: Not on file    Minutes of Exercise per Session: Not on file   Stress:     Feeling of Stress : Not on file   Social Connections:     Frequency of Communication with Friends and Family: Not on file    Frequency of Social Gatherings with Friends and Family: Not on file    Attends [employer] Services: Not on file    Active Member of [address] [place] [address] [employer] or Organizations: Not on file    Attends Club or Organization Meetings: Not on file    Marital Status: Not on file   Intimate Partner Violence:     Fear of Current or Ex-Partner: Not on file    Emotionally Abused: Not on file    Physically Abused: Not on file    Sexually Abused: Not on file   Housing Stability:     Unable to Pay for Housing in the Last Year: Not on file    Number of [place] in the Last Year: Not on file    Unstable Housing in the Last Year: Not on file         Family History:  Family History   Problem Relation Age of Onset    Arthritis Mother     Hearing Loss Father     Arthritis Sister        Home Meds:  No current facility-administered medications on file prior to encounter. Current Outpatient Medications on File Prior to Encounter   Medication Sig Dispense Refill    Multiple Vitamins-Minerals (MULTIVITAMIN ADULTS PO) Take by mouth      levothyroxine (SYNTHROID) 100 MCG tablet Take 100 mcg by mouth every morning (before breakfast)         Physical Exam:  Vitals:    03/12/22 2145   BP: (!) 181/86   Pulse: 77   Resp:    Temp:    SpO2: 96%         Based on new provisions and guidance offered in setting of COVID 19 outbreak and in order to preserve personal protective equipment in accordance with the flexibilities announced by CMS limited examination is performed.     General: Well appearing, not diaphoretic, no acute distress  Head: Normocephalic, atraumatic  Eyes: No ocular discharge, no scleral injection, no icterus  Ears: Normal external auricles  Nose: No rhinorrhea, no epistaxis  Throat: Not examined, no difficulty swallowing  Pulm: No apparent respiratory distress  Cardio: Normal rate, regular rhythm, no peripheral edema  GI: non-distended  Neuro: Alert and oriented, cn2-12 grossly intact, strength 5/5 bilaterally. Psych: Cooperative  Skin: no jaundice    Assessment and Plan:   Patient Active Problem List   Diagnosis    Thyroid disease    Urinary frequency    Macular degeneration of left eye    Hyponatremia    Diverticulitis    Superior mesenteric artery stenosis (HCC)    Hiatal hernia    Weakness     # generalized weakness  # frequent falls  # hypothyroidism        - check tsh, continue synthroid  - pt/ot, case management  - prn antiemetics  - tylenol, norco,  morphine for pain. - Continue to monitor electrolytes and replete as appropriate  - Pt high risk for vte, lmwh for vte ppx.  - Continue medications for chronic problems    I have personally reviewed pertinent laboratory, ekg and imaging results, as well as documentation in the patient's emr. Laboratory and imaging orders per the above plan have been addressed, see orders above.  Patient's case, assessment and plan have been discussed on this date with patient

## 2022-03-13 NOTE — PROGRESS NOTES
Son at bedside visiting with patient. Discussed with him that PT recommended SNF placement and he would like patient to go to [facility].  This facility is right across from patients current living situation at the [address] [facility]

## 2022-03-13 NOTE — PROGRESS NOTES
Son also gave this writer an updated medication list. Meds added to patients admission home med list in chart and placed copies in paper chart.

## 2022-03-13 NOTE — PROGRESS NOTES
4 Eyes Skin Assessment     The patient is being assess for   Admission    I agree that 2 RN's have performed a thorough Head to Toe Skin Assessment on the patient. ALL assessment sites listed below have been assessed. Areas assessed for pressure by both nurses:   [x]   Head, Face, and Ears   [x]   Shoulders, Back, and Chest, Abdomen  [x]   Arms, Elbows, and Hands   [x]   Coccyx, Sacrum, and Ischium  [x]   Legs, Feet, and Heels          Co-signer eSignature: Electronically signed by Brittany Man RN on 3/13/22 at 12:03 AM EST    Does the Patient have Skin Breakdown related to pressure?   No            Harpreet Prevention initiated:  Yes   Wound Care Orders initiated:  Yes      [id number] nurse consulted for Pressure Injury (Stage 3,4, Unstageable, DTI, NWPT, Complex wounds)and New or Established Ostomies:  No      Primary Nurse eSignature: Electronically signed by Suhail Fong RN on 3/12/22 at 11:30 PM EST

## 2022-03-13 NOTE — PROGRESS NOTES
Hospitalist Progress Note      PCP: Brandon Jama MD    Date of Admission: 3/12/2022    Chief Complaint: Progressive weakness    Hospital Course: Presents to emergency department due to progressive weakness for a week, with no other specific complaints. Current consideration is therapy evaluation and consider for rehab stay. Subjective: No chest pain, no shortness of breath, no nausea, no vomiting      Medications:  Reviewed    Infusion Medications    sodium chloride       Scheduled Medications    levothyroxine  100 mcg Oral QAM AC    sodium chloride flush  5-40 mL IntraVENous 2 times per day    enoxaparin  40 mg SubCUTAneous Daily     PRN Meds: sodium chloride flush, sodium chloride, ondansetron **OR** ondansetron, polyethylene glycol, acetaminophen **OR** acetaminophen, morphine, HYDROcodone 5 mg - acetaminophen    No intake or output data in the 24 hours ending 03/13/22 1128    Physical Exam Performed:    BP (!) 178/79   Pulse 73   Temp 98.3 °F (36.8 °C) (Oral)   Resp 18   Ht 5' 5\" (1.651 m)   Wt 120 lb (54.4 kg)   SpO2 96%   BMI 19.97 kg/m²     General appearance: No apparent distress, appears stated age and cooperative. HEENT: Pupils equal, round, and reactive to light. Conjunctivae/corneas clear. Neck: Supple, with full range of motion. No jugular venous distention. Trachea midline. Respiratory:  Normal respiratory effort. Clear to auscultation, bilaterally without Rales/Wheezes/Rhonchi. Cardiovascular: Regular rate and rhythm with normal S1/S2 without murmurs, rubs or gallops. Abdomen: Soft, non-tender, non-distended with normal bowel sounds. Musculoskeletal: No clubbing, cyanosis or edema bilaterally. Full range of motion without deformity. Skin: Skin color, texture, turgor normal.  No rashes or lesions. Neurologic:  Neurovascularly intact without any focal sensory/motor deficits.  Cranial nerves: II-XII intact, grossly non-focal.  Psychiatric: Alert and oriented, pleasant. Capillary Refill: Brisk,3 seconds, normal   Peripheral Pulses: +2 palpable, equal bilaterally       Labs:   Recent Labs     03/12/22 1929 03/13/22  0715   WBC 4.3 4.2   HGB 12.0 11.8*   HCT 36.7 36.1    232     Recent Labs     03/12/22 1929 03/13/22 0715   * 137   K 4.9 3.8    102   CO2 21 24   BUN 28* 18   CREATININE 0.6 <0.5*   CALCIUM 9.2 9.2     Recent Labs     03/12/22 1929   AST 30   ALT 12   BILITOT <0.2   ALKPHOS 226*     No results for input(s): INR in the last 72 hours. Recent Labs     03/12/22 1929   CKTOTAL 116   TROPONINI <0.01       Urinalysis:      Lab Results   Component Value Date    NITRU Negative 03/12/2022    WBCUA 3-5 07/22/2017    BACTERIA Rare 07/22/2017    RBCUA 0-2 07/22/2017    BLOODU Negative 03/12/2022    SPECGRAV 1.020 03/12/2022    GLUCOSEU Negative 03/12/2022       Radiology:  CT Head WO Contrast   Final Result   No acute intracranial abnormality. Stable chronic atrophic and ischemic white matter changes         CT ABDOMEN PELVIS W IV CONTRAST Additional Contrast? None   Final Result   Evaluation was limited due to streak beam hardening artifact throughout and   lack of intra-abdominal-intrapelvic fat. A moderate sized fixed para   esophageal hernia was noted. No renal mass or hydronephrosis was identified. No colitis or diverticulitis was seen. XR CHEST PORTABLE   Final Result      1. No acute cardiopulmonary abnormality. Assessment/Plan:    Active Hospital Problems    Diagnosis     Weakness [R53.1]      PLAN:    Progressive weakness  Unclear reason at this time. TSH is pending. Continue therapy, awaiting recommendations. No evidence of infection    Hypothyroidism  Has been compliant with Synthroid at home. Continue same dose and check repeat TSH to evaluate for dose adjustment. Frequent falls  No evidence of fracture.   Being evaluated by therapy for progressive weakness        DVT Prophylaxis: Lovenox  Diet:

## 2022-03-13 NOTE — PROGRESS NOTES
Physical Therapy    Facility/Department: [facility] C5 - MED SURG/ORTHO  Initial Assessment and Treatment    NAME: Arron Dumas  : 1920  MRN: [id number]    Date of Service: 3/13/2022    Discharge Recommendations:  Subacute/Skilled Nursing Facility   PT Equipment Recommendations  Equipment Needed: No  Other: defer to patient's facility  If pt is unable to be seen after this session, please let this note serve as discharge summary. Please see case management note for discharge disposition. Thank you. Barriers to home discharge:   [x] Reported available assist at home upon discharge limited   [x] Patient or family requests DC to other than home    [x] Other: patient has had multiple recent falls at home. Assessment   Body structures, Functions, Activity limitations: Decreased ADL status; Decreased functional mobility ; Decreased posture;Decreased balance;Decreased strength;Decreased ROM; Decreased cognition;Decreased safe awareness;Decreased endurance;Decreased high-level IADLs  Assessment: Patient is a 8 year old female who presented to [facility] on 3/12/22 with weakness and multiple recent falls. Patient said that she is normally able to independently ambulate short household distances with a rolling walker. Today the patient completed bed mobility with mod assist x 2, sit<>stand transfers with max assist and she dependently transferred from bed to toilet with a juan-stedy lift device. Patient's mobility today was limited by her nausea, vomiting and poor balance. Patient is below her prior level of function and would benefit from skilled PT plan of care. PT recommends that this patient receive skilled PT in the SNF setting, when medically stable, in order to address her therapy deficits and to help her maximize her safety and independence with all functional mobility. PT to continue to follow. Treatment Diagnosis: decreased independence with functional mobility.   Specific instructions for Next Treatment: progress mobility as tolerated  Prognosis: Fair  Decision Making: Medium Complexity  PT Education: Goals; General Safety;Gait Training;Disease Specific Education;Plan of Care; Functional Mobility Training;Pressure Relief; Injury Prevention;PT Role;Equipment  Patient Education: Disease Specific Education: Patient educated on importance of increased mobility, use of call bell, prevention of complications of bedrest, and general safety during hospitalization. Patient will need education reinforcement. Barriers to Learning: impaired cognition  REQUIRES PT FOLLOW UP: Yes  Activity Tolerance  Activity Tolerance: Patient limited by fatigue;Patient limited by endurance; Patient limited by cognitive status;Treatment limited secondary to agitation  Activity Tolerance: patient refused vitals upon arrival to room. \"I need to get up now! \" Vitals after patient returned from bathroom. 194/75 72 BPM 94% on room air. RN made aware. Patient's RN Argelia Hayward gave patient her blood pressure medicine. ~5 minutes post medication administration her BP dropped to 162/70 RN made aware. activity also limited by patient vomiting during session. Patient Diagnosis(es): The primary encounter diagnosis was Generalized weakness. A diagnosis of Frequent falls was also pertinent to this visit. has a past medical history of Thyroid disease and TIA (transient ischemic attack). has a past surgical history that includes Cataract removal and Hysterectomy.     Restrictions  Restrictions/Precautions  Restrictions/Precautions: General Precautions,Fall Risk,Up as Tolerated  Position Activity Restriction  Other position/activity restrictions: AVASYS  Vision/Hearing  Vision: Impaired  Vision Exceptions: Wears glasses for reading  Hearing: Exceptions to [facility]  Hearing Exceptions: Hard of hearing/hearing concerns;Bilateral hearing aid     Subjective  General  Chart Reviewed: Yes  Patient assessed for rehabilitation services?: Yes  Additional Pertinent Hx: HPI per chart, Renata Rebolledo is a [age] y.o. female with a history of hypothyroidism, hyponatremia, TIA who presents to the ED complaining of weakness. Patient reports worsening weakness and fatigue over the past week with multiple ground-level falls. She reports some nausea vomiting and diarrhea today however only a few episodes. States she struck her head on the wall during a fall yesterday but did not lose consciousness. CT Head: No acute intracranial abnormality. \"  Response To Previous Treatment: Not applicable  Family / Caregiver Present: No  Referring Practitioner: Radha Davis MD  Referral Date : 03/12/22  Follows Commands: Impaired  General Comment  Comments: Supine in bed upon entry of therapy staff. Cleared for therapy by RN. Subjective  Subjective: Patient agreed to participate. Pain Screening  Patient Currently in Pain: No  Vital Signs  Patient Currently in Pain: No       Orientation     Social/Functional History  Social/Functional History  Lives With: Alone  Type of Home: Assisted living ([facility])  Home Layout: One level  Home Access: Level entry  Bathroom Shower/Tub: Walk-in shower  Home Equipment: Rolling walker (transport chair)  Receives Help From: Other (comment) (\" i have a cleaning lady once a week. \")  ADL Assistance: Needs assistance  Homemaking Assistance: Needs assistance  Meal Prep: Total (\"I have my food delivered to my room. \")  Laundry: Total  Vacuuming: Total  Cleaning: Total  Homemaking Responsibilities: No  Ambulation Assistance: Independent (patient said that she was walking to her bathroom with her rolling walker. \"I just stayed in my room. \")  Transfer Assistance: Independent  Active : No  Additional Comments: Patient presented to the hospital status post fall. Patient said that she has fallen 3 times recently (within the past day or so). Patient is a poor historian so all information will need to be confirmed by patient's family/social work.   Cognition Cognition  Overall Cognitive Status: Exceptions  Arousal/Alertness: Delayed responses to stimuli (however this may be due to patient is hard of hearing)  Following Commands: Follows one step commands with increased time; Follows one step commands with repetition  Attention Span: Attends with cues to redirect  Memory: Decreased recall of recent events  Safety Judgement: Decreased awareness of need for safety;Decreased awareness of need for assistance  Problem Solving: Decreased awareness of errors  Insights: Decreased awareness of deficits  Initiation: Requires cues for some  Sequencing: Requires cues for some  Cognition Comment: patient is very impulsive. Objective     Observation/Palpation  Posture: Poor  Observation: severe kyphosis. patient appears to potentially have signs of scoliosis as well. PROM RLE (degrees)  RLE PROM: WFL  AROM RLE (degrees)  RLE General AROM: decreased bilateral hip flexion, knee flexion/extension  PROM LLE (degrees)  LLE PROM: WFL  AROM LLE (degrees)  LLE General AROM: decreased bilateral hip flexion, knee flexion/extension  Strength RLE  Strength RLE: Exception  R Hip Flexion: 3-/5  R Knee Flexion: 3-/5  R Knee Extension: 3-/5  R Ankle Dorsiflexion: 3+/5  R Ankle Plantar flexion: 3+/5  Strength LLE  Strength LLE: Exception  L Hip Flexion: 3-/5  L Knee Flexion: 3-/5  L Knee Extension: 3-/5  L Ankle Dorsiflexion: 3+/5  L Ankle Plantar Flexion: 3+/5     Sensation  Overall Sensation Status: Impaired (numbness in left leg)  Bed mobility  Supine to Sit: Contact guard assistance  Sit to Supine:  Moderate assistance;2 Person assistance (patient also likely needed additional help due to patient was feeling nauseous/recently vomited)  Scooting: Dependent/Total;2 Person assistance (to scoot up in the bed)  Comment: head of the bed elevated  Transfers  Sit to Stand: Minimal Assistance  Stand to sit: Maximum Assistance  Bed to Chair: Dependent/Total  Comment: patient transported from bed to toilet dependently via juan-stedy. all standing was performed in the juan-stedy because patient is very impulsive. poor eccentric control of hip extensors. Ambulation  Ambulation?: No (not safe to attempt due to poor balance, impulsive, confused.)     Balance  Posture: Poor  Sitting - Static: Fair  Sitting - Dynamic: Poor;+  Standing - Static: Poor;+  Comments: min assist at times needed for patient to maintain seated balance at edge of bed. Plan   Plan  Times per week: 3-5/week  Plan weeks: 1 week 3/20/22  Specific instructions for Next Treatment: progress mobility as tolerated  Current Treatment Recommendations: Strengthening,Home Exercise Program,Safety Education & Training,ROM,Balance Training,Endurance Training,Patient/Caregiver Education & Training,Equipment Evaluation, Education, & procurement,Functional Mobility Training,Transfer Training,Gait Training,Positioning  Safety Devices  Type of devices: All fall risk precautions in place,Bed alarm in place,Call light within reach,Nurse notified,Gait belt,Patient at risk for falls,Left in bed,Telesitter in use    AM-PAC Score     AM-PAC Inpatient Mobility without Stair Climbing Raw Score : 8 (03/13/22 1151)  AM-PAC Inpatient without Stair Climbing T-Scale Score : 30.65 (03/13/22 1151)  Mobility Inpatient CMS 0-100% Score: 80.91 (03/13/22 [facility])  Mobility Inpatient without Stair CMS G-Code Modifier : CM (03/13/22 1151)       Goals  Short term goals  Time Frame for Short term goals: 1 week 3/20/22  Short term goal 1: Supine <> sit with CGA  Short term goal 2: Sit <> stand with min assist  Short term goal 3: Bed <> chair with least restrictive assistive device and min assist  Short term goal 4: Ambulate 25 feet with least restrictive assistive device and min assist  Short term goal 5: By 3/16/22 Patient will tolerate 12-15 reps of her exercises to maximize her strength/endurance  Patient Goals   Patient goals : To have her nausea/vomiting resolve.  To get

## 2022-03-14 LAB
A/G RATIO: 1.5 (ref 1.1–2.2)
ALBUMIN SERPL-MCNC: 3.5 G/DL (ref 3.4–5)
ALP BLD-CCNC: 202 U/L (ref 40–129)
ALT SERPL-CCNC: 8 U/L (ref 10–40)
ANION GAP SERPL CALCULATED.3IONS-SCNC: 9 MMOL/L (ref 3–16)
AST SERPL-CCNC: 12 U/L (ref 15–37)
BILIRUB SERPL-MCNC: 0.3 MG/DL (ref 0–1)
BUN BLDV-MCNC: 23 MG/DL (ref 7–20)
CALCIUM SERPL-MCNC: 9 MG/DL (ref 8.3–10.6)
CHLORIDE BLD-SCNC: 104 MMOL/L (ref 99–110)
CO2: 26 MMOL/L (ref 21–32)
CREAT SERPL-MCNC: 0.6 MG/DL (ref 0.6–1.2)
GFR AFRICAN AMERICAN: >60
GFR NON-AFRICAN AMERICAN: >60
GLUCOSE BLD-MCNC: 94 MG/DL (ref 70–99)
HCT VFR BLD CALC: 33.8 % (ref 36–48)
HEMOGLOBIN: 11.2 G/DL (ref 12–16)
MCH RBC QN AUTO: 31.2 PG (ref 26–34)
MCHC RBC AUTO-ENTMCNC: 33 G/DL (ref 31–36)
MCV RBC AUTO: 94.6 FL (ref 80–100)
PDW BLD-RTO: 15.1 % (ref 12.4–15.4)
PLATELET # BLD: 222 K/UL (ref 135–450)
PMV BLD AUTO: 7.4 FL (ref 5–10.5)
POTASSIUM SERPL-SCNC: 4.3 MMOL/L (ref 3.5–5.1)
RBC # BLD: 3.58 M/UL (ref 4–5.2)
SODIUM BLD-SCNC: 139 MMOL/L (ref 136–145)
TOTAL PROTEIN: 5.9 G/DL (ref 6.4–8.2)
WBC # BLD: 3.3 K/UL (ref 4–11)

## 2022-03-14 PROCEDURE — G0378 HOSPITAL OBSERVATION PER HR: HCPCS

## 2022-03-14 PROCEDURE — 96372 THER/PROPH/DIAG INJ SC/IM: CPT

## 2022-03-14 PROCEDURE — 2580000003 HC RX 258: Performed by: FAMILY MEDICINE

## 2022-03-14 PROCEDURE — 6370000000 HC RX 637 (ALT 250 FOR IP): Performed by: INTERNAL MEDICINE

## 2022-03-14 PROCEDURE — 80053 COMPREHEN METABOLIC PANEL: CPT

## 2022-03-14 PROCEDURE — 36415 COLL VENOUS BLD VENIPUNCTURE: CPT

## 2022-03-14 PROCEDURE — 85027 COMPLETE CBC AUTOMATED: CPT

## 2022-03-14 PROCEDURE — 6370000000 HC RX 637 (ALT 250 FOR IP): Performed by: FAMILY MEDICINE

## 2022-03-14 PROCEDURE — 6360000002 HC RX W HCPCS: Performed by: FAMILY MEDICINE

## 2022-03-14 RX ADMIN — POLYETHYLENE GLYCOL 3350 17 G: 17 POWDER, FOR SOLUTION ORAL at 09:45

## 2022-03-14 RX ADMIN — LEVOTHYROXINE SODIUM 100 MCG: 0.1 TABLET ORAL at 09:50

## 2022-03-14 RX ADMIN — DOCUSATE SODIUM 100 MG: 100 CAPSULE, LIQUID FILLED ORAL at 19:44

## 2022-03-14 RX ADMIN — ENOXAPARIN SODIUM 40 MG: 40 INJECTION SUBCUTANEOUS at 09:47

## 2022-03-14 RX ADMIN — POLYETHYLENE GLYCOL 3350 17 G: 17 POWDER, FOR SOLUTION ORAL at 19:44

## 2022-03-14 RX ADMIN — SODIUM CHLORIDE, PRESERVATIVE FREE 10 ML: 5 INJECTION INTRAVENOUS at 09:37

## 2022-03-14 RX ADMIN — PANTOPRAZOLE SODIUM 40 MG: 40 TABLET, DELAYED RELEASE ORAL at 09:50

## 2022-03-14 RX ADMIN — DOCUSATE SODIUM 100 MG: 100 CAPSULE, LIQUID FILLED ORAL at 09:50

## 2022-03-14 RX ADMIN — ASPIRIN 81 MG: 81 TABLET, COATED ORAL at 09:50

## 2022-03-14 RX ADMIN — CYANOCOBALAMIN TAB 500 MCG 1000 MCG: 500 TAB at 09:49

## 2022-03-14 RX ADMIN — PANTOPRAZOLE SODIUM 40 MG: 40 TABLET, DELAYED RELEASE ORAL at 19:44

## 2022-03-14 NOTE — PROGRESS NOTES
Hospitalist Progress Note      PCP: Jason Huitron MD    Date of Admission: 3/12/2022    Chief Complaint: Progressive weakness    Hospital Course:   80 y.o. yo female who  has a past medical history of Thyroid disease and TIA (transient ischemic attack). hypothyroidism presents to [facility] ED complaining of generalized weakness for approximately 1 week, no inciting event, constant getting progressively worse, causing frequent falls in the last week. No exacerbating relieving factors. Moderately severe, generalized and nonradiating. Compliant with Synthroid. In the emergency department metabolic panel and hemogram are grossly unremarkable. Admit to observation for PT/OT and case management to evaluate for possible either home health or inpatient rehab placement. Subjective: No chest pain, no shortness of breath, no nausea, no vomiting      Medications:  Reviewed    Infusion Medications    sodium chloride       Scheduled Medications    aspirin  81 mg Oral Daily    docusate sodium  100 mg Oral BID    pantoprazole  40 mg Oral BID    vitamin B-12  1,000 mcg Oral Daily    polyethylene glycol  17 g Oral BID    levothyroxine  100 mcg Oral QAM AC    sodium chloride flush  5-40 mL IntraVENous 2 times per day    enoxaparin  40 mg SubCUTAneous Daily     PRN Meds: sodium chloride flush, sodium chloride, ondansetron **OR** ondansetron, polyethylene glycol, acetaminophen **OR** acetaminophen, morphine      Intake/Output Summary (Last 24 hours) at 3/14/2022 1314  Last data filed at 3/13/2022 1438  Gross per 24 hour   Intake 120 ml   Output    Net 120 ml       Physical Exam Performed:    BP (!) 186/77   Pulse 71   Temp 98.2 °F (36.8 °C) (Oral)   Resp 16   Ht 5' 5\" (1.651 m)   Wt 120 lb (54.4 kg)   SpO2 95%   BMI 19.97 kg/m²     General appearance: No apparent distress, appears stated age and cooperative. HEENT: Pupils equal, round, and reactive to light.  Conjunctivae/corneas clear.  Neck: Supple, with full range of motion. No jugular venous distention. Trachea midline. Respiratory:  Normal respiratory effort. Clear to auscultation, bilaterally without Rales/Wheezes/Rhonchi. Cardiovascular: Regular rate and rhythm with normal S1/S2 without murmurs, rubs or gallops. Abdomen: Soft, non-tender, non-distended with normal bowel sounds. Musculoskeletal: No clubbing, cyanosis or edema bilaterally. Full range of motion without deformity. Skin: Skin color, texture, turgor normal.  No rashes or lesions. Neurologic:  Neurovascularly intact without any focal sensory/motor deficits. Cranial nerves: II-XII intact, grossly non-focal.  Psychiatric: Alert and oriented, pleasant. Capillary Refill: Brisk,3 seconds, normal   Peripheral Pulses: +2 palpable, equal bilaterally       Labs:   Recent Labs     03/12/22 1929 03/13/22  0715 03/14/22  0623   WBC 4.3 4.2 3.3*   HGB 12.0 11.8* 11.2*   HCT 36.7 36.1 33.8*    232 222     Recent Labs     03/12/22 1929 03/13/22  0715 03/14/22  0623   * 137 139   K 4.9 3.8 4.3    102 104   CO2 21 24 26   BUN 28* 18 23*   CREATININE 0.6 <0.5* 0.6   CALCIUM 9.2 9.2 9.0     Recent Labs     03/12/22 1929 03/14/22  0623   AST 30 12*   ALT 12 8*   BILITOT <0.2 0.3   ALKPHOS 226* 202*     No results for input(s): INR in the last 72 hours. Recent Labs     03/12/22 1929   CKTOTAL 116   TROPONINI <0.01       Urinalysis:      Lab Results   Component Value Date    NITRU Negative 03/12/2022    WBCUA 3-5 07/22/2017    BACTERIA Rare 07/22/2017    RBCUA 0-2 07/22/2017    BLOODU Negative 03/12/2022    SPECGRAV 1.020 03/12/2022    GLUCOSEU Negative 03/12/2022       Radiology:  CT Head WO Contrast   Final Result   No acute intracranial abnormality.       Stable chronic atrophic and ischemic white matter changes         CT ABDOMEN PELVIS W IV CONTRAST Additional Contrast? None   Final Result   Evaluation was limited due to streak beam hardening artifact throughout and   lack of intra-abdominal-intrapelvic fat. A moderate sized fixed para   esophageal hernia was noted. No renal mass or hydronephrosis was identified. No colitis or diverticulitis was seen. XR CHEST PORTABLE   Final Result      1. No acute cardiopulmonary abnormality. Assessment/Plan:    Active Hospital Problems    Diagnosis     Weakness [R53.1]      Age related debility  - TSH WNL  - dietary supplements  - PT/OT    Hypothyroidism  - TSH WNL  - continue home synthroid    DVT Prophylaxis: Lovenox  Diet: ADULT ORAL NUTRITION SUPPLEMENT; Breakfast, Lunch, Dinner; Standard High Calorie/High Protein Oral Supplement  ADULT DIET; Easy to [employer]  Code Status: Full Code    PT/OT Eval Status: ordered    Dispo - SNF pending precert.  Medically ready for discharge    Galen Farooq MD

## 2022-03-14 NOTE — PROGRESS NOTES
Comprehensive Nutrition Assessment    Type and Reason for Visit:  Initial,Positive Nutrition Screen    Nutrition Recommendations/Plan:   1. RD to modify diet to Easy to Chew  2. Encourage PO intake  3. RD to add ensure TID   4. RD to order new updated weight   5. Monitor nutrition adequacy, pertinent labs, bowel habits, wt changes, and clinical progress    Nutrition Assessment:  Positive nutrition screen for wounds/ low BMI: 80 y.o female w/ PMH of thyroid disease admitted w/ weakness. Pt on regular diet w/ PO intakes of 51-76% of meals. Spoke w/ pt and pt's son. Reports pt has had poor PO intake x 6 months PTA and 20 lb wt loss. AZA=004 lb, GRAY wt d/t estimated wt will order new updated weight. Reports that pt had emesis yesterday and still feeling nauseous today. Pt's son reports difficulty chewing, would like softer diet, RD to modify. Pt likes ensure and drinks them at home, RD to add TID. Provided pt w/ ensure at time of visit. Continue to encourage PO intake, will continue to monitor. Malnutrition Assessment:  Malnutrition Status: At risk for malnutrition (Comment)    Context:  Acute Illness     Findings of the 6 clinical characteristics of malnutrition:  Energy Intake:  1 - 75% or less of estimated energy requirements for 7 or more days  Muscle Mass Loss:  1 - Mild muscle mass loss Temples (temporalis),Clavicles (pectoralis & deltoids)    Estimated Daily Nutrient Needs:  Energy (kcal):  4062-6874 kcals/day; Weight Used for Energy Requirements:  Ideal (57 kg)     Protein (g):  57-68 g/day; Weight Used for Protein Requirements:  Ideal (1-1.2 g/kg)        Method Used for Fluid Requirements:  1 ml/kcal      Nutrition Related Findings:  + BM 2 days ago per pt. Emesis yesterday. Poor dentition. Wounds:  None       Current Nutrition Therapies:    ADULT DIET;  Regular  ADULT ORAL NUTRITION SUPPLEMENT; Breakfast, Lunch, Dinner; Standard High Calorie/High Protein Oral Supplement    Anthropometric Measures:  · Height: 5' 5\" (165.1 cm)  · Current Body Weight: 120 lb (54.4 kg)    · Usual Body Weight: 135 lb (61.2 kg) (per son)     · Ideal Body Weight: 125 lbs; % Ideal Body Weight 96 %   · BMI: 20  · BMI Categories: Underweight (BMI less than 22) age over 72       Nutrition Diagnosis:   · Inadequate oral intake related to early satiety,inadequate protein-energy intake as evidenced by intake 51-75%,poor intake prior to admission,BMI,weight loss,vomiting    Nutrition Interventions:   Food and/or Nutrient Delivery:  Continue Current Diet,Start Oral Nutrition Supplement  Nutrition Education/Counseling:  Education not indicated   Coordination of Nutrition Care:  Continue to monitor while inpatient    Goals:  Consume 50% or greater of 3 meals per day and ONS during this admission.        Nutrition Monitoring and Evaluation:   Behavioral-Environmental Outcomes:  None Identified   Food/Nutrient Intake Outcomes:  Food and Nutrient Intake,Supplement Intake  Physical Signs/Symptoms Outcomes:  Chewing or Swallowing,Constipation,Nausea or Vomiting,Nutrition Focused Physical Findings,Weight     Discharge Planning:    Continue current diet,Continue Oral Nutrition Supplement     Electronically signed by Adrien Siddiqui MS, RD, LD on 3/14/22 at 12:39 PM EDT    Contact: Office: [phone]; [address]: [phone]

## 2022-03-14 NOTE — PLAN OF CARE
Nutrition Problem #1: Inadequate oral intake  Intervention: Food and/or Nutrient Delivery: Start Oral Nutrition Supplement,Modify Current Diet  Nutritional Goals: Consume 50% or greater of 3 meals per day and ONS during this admission.

## 2022-03-14 NOTE — CARE COORDINATION
CASE MANAGEMENT INITIAL ASSESSMENT      Reviewed chart and completed assessment with patient: PT and son Neville Cuba  Explained Case Management role/services. Yes    Primary contact information: Sivakumar SALAZAR [facility] Decision Maker :   Primary Decision Maker: Ruben Kim - Oleksandr - [phone]          Can this person be reached and be able to respond quickly, such as within a few minutes or hours? Yes    Admit date/status: Observation, 3/13/22  Diagnosis:  Weakness, Generalized weakness, Frequent falls  Is this a Readmission?:  No      Insurance: [facility] Medicare   Precert required for SNF: No       3 night stay required: No    Living arrangements, Adls, care needs, prior to admission: Has lived at [address] [facility] for almost four years. Independent in ADL's uses a rollator to ambulate and eats meals in her room. Son drives and checks on Mom daily. Transportation: [address] at home:  Walker_X_Cane__RTS_X_ BSC__Shower Chair__X  02__ HHN__ CPAP__  BiPap__  Hospital Bed__ W/C___ Other__________    Services in the home and/or outpatient, prior to admission: None      PT/OT recs: SNF    Hospital Exemption Notification (HEN): Needed for SNF, not initiated. Barriers to discharge: None    Plan/comments: CM met with pt at bedside with sivakumar Cuba for initial assessment and to discuss therapy recs for SNF. Neville Cuba wanting referrals to 1.) [facility], referral faxed and spoke to Luiz in admissions updated that they are NOT able to accept. 2.) [facility], referal faxed. Spoke to Rey garcia in admissions and they can accept pt for skilled stay. LVM with Son and asked EGS to call sivakumar Cuba as well. Precert initiated this day to EGS.  CM following-Carina Ramon RN      ECOC on chart for MD signature

## 2022-03-15 VITALS
DIASTOLIC BLOOD PRESSURE: 81 MMHG | OXYGEN SATURATION: 95 % | HEART RATE: 81 BPM | BODY MASS INDEX: 19.99 KG/M2 | TEMPERATURE: 97.8 F | WEIGHT: 120 LBS | SYSTOLIC BLOOD PRESSURE: 127 MMHG | HEIGHT: 65 IN | RESPIRATION RATE: 16 BRPM

## 2022-03-15 PROCEDURE — 6370000000 HC RX 637 (ALT 250 FOR IP): Performed by: FAMILY MEDICINE

## 2022-03-15 PROCEDURE — 2580000003 HC RX 258: Performed by: FAMILY MEDICINE

## 2022-03-15 PROCEDURE — 6370000000 HC RX 637 (ALT 250 FOR IP): Performed by: INTERNAL MEDICINE

## 2022-03-15 PROCEDURE — G0378 HOSPITAL OBSERVATION PER HR: HCPCS

## 2022-03-15 PROCEDURE — 97110 THERAPEUTIC EXERCISES: CPT

## 2022-03-15 PROCEDURE — 6360000002 HC RX W HCPCS: Performed by: FAMILY MEDICINE

## 2022-03-15 PROCEDURE — 97530 THERAPEUTIC ACTIVITIES: CPT

## 2022-03-15 PROCEDURE — 96372 THER/PROPH/DIAG INJ SC/IM: CPT

## 2022-03-15 RX ORDER — ONDANSETRON 4 MG/1
4 TABLET, ORALLY DISINTEGRATING ORAL EVERY 8 HOURS PRN
Qty: 21 TABLET | Refills: 0
Start: 2022-03-15

## 2022-03-15 RX ADMIN — PANTOPRAZOLE SODIUM 40 MG: 40 TABLET, DELAYED RELEASE ORAL at 08:28

## 2022-03-15 RX ADMIN — DOCUSATE SODIUM 100 MG: 100 CAPSULE, LIQUID FILLED ORAL at 08:28

## 2022-03-15 RX ADMIN — ASPIRIN 81 MG: 81 TABLET, COATED ORAL at 08:28

## 2022-03-15 RX ADMIN — SODIUM CHLORIDE, PRESERVATIVE FREE 10 ML: 5 INJECTION INTRAVENOUS at 08:28

## 2022-03-15 RX ADMIN — CYANOCOBALAMIN TAB 500 MCG 1000 MCG: 500 TAB at 08:28

## 2022-03-15 RX ADMIN — LEVOTHYROXINE SODIUM 100 MCG: 0.1 TABLET ORAL at 08:28

## 2022-03-15 RX ADMIN — ONDANSETRON 4 MG: 4 TABLET, ORALLY DISINTEGRATING ORAL at 10:41

## 2022-03-15 RX ADMIN — ENOXAPARIN SODIUM 40 MG: 40 INJECTION SUBCUTANEOUS at 08:27

## 2022-03-15 RX ADMIN — POLYETHYLENE GLYCOL 3350 17 G: 17 POWDER, FOR SOLUTION ORAL at 08:28

## 2022-03-15 ASSESSMENT — PAIN SCALES - GENERAL: PAINLEVEL_OUTOF10: 0

## 2022-03-15 NOTE — CONSULTS
[facility] Wound Ostomy Continence Nurse  Consult Note       NAME:  Bry Allan  MEDICAL RECORD NUMBER:  [id number]  AGE: 80 y.o. GENDER: female  : 1920  TODAY'S DATE:  3/15/2022    Subjective  Sleeping in bed. Reason for WOCN Evaluation and Assessment: left lower leg irina Allan is a 80 y.o. 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 Contributing Factors: venous stasis, decreased mobility and shear force    Wound History: Patient follows Dr Roshan Anaya at [facility] wound care center. Last seen on 3/8/22. 80 y.o. yo female who  has a past medical history of Thyroid disease and TIA (transient ischemic attack). hypothyroidism presents to [facility] ED complaining of generalized weakness for approximately 1 week, no inciting event, constant getting progressively worse, causing frequent falls in the last week. No exacerbating relieving factors. Current Wound Care Treatment:  Foam dressing.     Patient Goal of Care:  [x] Wound Healing  [] Odor Control  [] Palliative Care  [] Pain Control   [] Other:         PAST MEDICAL HISTORY        Diagnosis Date    Thyroid disease     TIA (transient ischemic attack)        PAST SURGICAL HISTORY    Past Surgical History:   Procedure Laterality Date    CATARACT REMOVAL      OU    HYSTERECTOMY         FAMILY HISTORY    Family History   Problem Relation Age of Onset    Arthritis Mother     Hearing Loss Father     Arthritis Sister        SOCIAL HISTORY    Social History     Tobacco Use    Smoking status: Never Smoker    Smokeless tobacco: Never Used   Substance Use Topics    Alcohol use: No    Drug use: No       ALLERGIES    Allergies   Allergen Reactions    Sulfa Antibiotics Rash    Cephalexin     Hydrocodone-Acetaminophen     Trazodone     Trimethoprim      SMZ- TMP DS  (Bactrim DS, Sulfatrim, Bactrim) MEDICATIONS    No current facility-administered medications on file prior to encounter. Current Outpatient Medications on File Prior to Encounter   Medication Sig Dispense Refill    pantoprazole (PROTONIX) 40 MG tablet Take 40 mg by mouth 2 times daily      oxybutynin (DITROPAN-XL) 5 MG extended release tablet Take 5 mg by mouth daily Every night      aspirin 81 MG EC tablet Take 81 mg by mouth daily      vitamin B-12 (CYANOCOBALAMIN) 1000 MCG tablet Take 1,000 mcg by mouth daily      docusate sodium (COLACE) 100 MG capsule Take 100 mg by mouth 2 times daily      polyethylene glycol (GLYCOLAX) 17 GM/SCOOP powder Take 17 g by mouth 2 times daily      psyllium (METAMUCIL) 58.6 % packet Take 1 packet by mouth daily      Multiple Vitamins-Minerals (MULTIVITAMIN ADULTS PO) Take by mouth      levothyroxine (SYNTHROID) 100 MCG tablet Take 100 mcg by mouth every morning (before breakfast)      triamcinolone (KENALOG) 0.1 % ointment       magnesium gluconate (MAGONATE) 500 MG tablet Take 500 mg by mouth daily      nystatin (MYCOSTATIN) 886511 UNIT/GM cream Apply topically 2 times daily Apply topically 2 times daily.  hydrocortisone 2.5 % ointment Apply topically 2 times daily Apply topically 2 times daily. Objective  Nurses just got her back to bed and now she is sleeping. Family in room had just left. /81   Pulse 81   Temp 97.8 °F (36.6 °C) (Oral)   Resp 16   Ht 5' 5\" (1.651 m)   Wt 120 lb (54.4 kg)   SpO2 95%   BMI 19.97 kg/m²     LABS:  WBC:    Lab Results   Component Value Date    WBC 3.3 03/14/2022     H/H:    Lab Results   Component Value Date    HGB 11.2 03/14/2022    HCT 33.8 03/14/2022     PTT:  No results found for: APTT, PTT[APTT}  PT/INR:  No results found for: PROTIME, INR  HgBA1c:  No results found for: LABA1C    Assessment  Open red wound base. Brooke wound slightly macerated. See photo.    Harpreet Risk Score: Harpreet Scale Score: 19    Patient Active Problem List   Diagnosis    Thyroid disease    Urinary frequency    Macular degeneration of left eye    Hyponatremia    Diverticulitis    Superior mesenteric artery stenosis (HCC)    Hiatal hernia    Weakness       Measurements:  Wound 03/15/22 Leg Distal;Left;Lateral open red (Active)   Wound Image   03/15/22 1511   Wound Etiology Venous 03/15/22 1511   Dressing Status New dressing applied 03/15/22 1511   Wound Cleansed Cleansed with saline 03/15/22 1511   Dressing/Treatment Alginate with Ag; Foam 03/15/22 1511   Dressing Change Due 03/17/22 03/15/22 1511   Wound Length (cm) 2 cm 03/15/22 1511   Wound Width (cm) 1 cm 03/15/22 1511   Wound Depth (cm) 0.1 cm 03/15/22 1511   Wound Surface Area (cm^2) 2 cm^2 03/15/22 1511   Wound Volume (cm^3) 0.2 cm^3 03/15/22 1511   Distance Tunneling (cm) 0 cm 03/15/22 1511   Tunneling Position ___ O'Clock 0 03/15/22 1511   Undermining Starts ___ O'Clock 0 03/15/22 1511   Undermining Ends___ O'Clock 0 03/15/22 1511   Undermining Maxium Distance (cm) 0 03/15/22 1511   Wound Assessment Pink/red 03/15/22 1511   Drainage Amount Scant 03/15/22 1511   Drainage Description Serosanguinous 03/15/22 1511   Odor None 03/15/22 1511   Brooke-wound Assessment Dry/flaky; Intact 03/15/22 1511   Margins Attached edges; Defined edges 03/15/22 1511   Wound Thickness Description not for Pressure Injury Partial thickness 03/15/22 1511   Number of days: 0     Left lateal lower leg:               Response to treatment:  Well tolerated by patient. Pain Assessment:  Severity:  0 / 10  Quality of pain: N/A  Wound Pain Timing/Severity: none  Premedicated: No    Plan   Plan of Care: Wound 03/15/22 Leg Distal;Left;Lateral open red-Dressing/Treatment: Alginate with Ag,Foam     Recommend:  Clean left lower lateral leg with normal saline. Apply alginate Ag then foam dressing, change every other day. Follow up with Dr Corinne Quezada at [facility].  Patient being discharged today to [facility] [address]. Specialty Bed Required : Yes   [] Low Air Loss   [x] Pressure Redistribution  [] Fluid Immersion  [] Bariatric  [] Total Pressure Relief  [] Other:     Current Diet: ADULT ORAL NUTRITION SUPPLEMENT; Breakfast, Lunch, Dinner; Standard High Calorie/High Protein Oral Supplement  ADULT DIET; Easy to ParkingCarma  Dietician consult:  Yes    Discharge Plan:  Placement for patient upon discharge: intermediate care facility    Patient appropriate for Outpatient [address]: Yes    Referrals:  []  / discharge planner following aetting up transfer for later today to SNF.   [] [facility]  [] Supplies  [] Other    Patient/Caregiver Teaching:  Level of patient/caregiver understanding able to:   [] Indicates understanding       [] Needs reinforcement  [] Unsuccessful      [] Verbal Understanding  [] Demonstrated understanding       [] No evidence of learning  [] Refused teaching         [x] N/A       Electronically signed by Vaishnavi Bay RN, MSN, Madonna Don on 3/15/2022 at 3:12 PM

## 2022-03-15 NOTE — PROGRESS NOTES
Report given to [employer]. Patient set to leave [facility].

## 2022-03-15 NOTE — CARE COORDINATION
CASE MANAGEMENT DISCHARGE SUMMARY      Discharge to: [facility]. Lives at [address] [place] [address]. Precertification completed: Yes, notified today. Hospital Exemption Notification (HENS) completed: PASSR completed by Efrain Araujo at [facility]. IMM given: Observation    New Durable Medical Equipment ordered/agency: None    Transportation:    Family/car: [employer]   Medical Transport explained to [employer]. Pt/family voice no agency preference. Agency used: [facility]  up time: 1615   Ambulance form completed: Yes    Confirmed discharge plan with: Met with pt and son at bedside. Patient: yes     Facility/Agency, name:  [facility] is aware. Phone number for report to facility: [phone]     RN, name: Aric Lozada    Note: Discharging nurse to complete ODALYS, reconcile AVS, and place final copy with patient's discharge packet. RN to ensure that written prescriptions for  Level II medications are sent with patient to the facility as per protocol.

## 2022-03-15 NOTE — PROGRESS NOTES
Pt's IV line removed without complications. Discussed d/c instructions with patient/cg, given opportunity to ask questions, and provided new medication education with side effects. Follow up appointment information included in d/c instructions. Pt verbalized understanding of d/c instructions. Patient was discharged to SNF with all belongings and taken outside medical transport.     John Mariano RN

## 2022-03-15 NOTE — PROGRESS NOTES
Physical Therapy  Facility/Department: [facility] C5 - MED SURG/ORTHO  Daily Treatment Note  NAME: Theodora Yip  : 1920  MRN: [id number]    Date of Service: 3/15/2022    Discharge Recommendations:  Subacute/Skilled Nursing Facility   PT Equipment Recommendations  Equipment Needed: No  Other: defer to patient's facility    Assessment   Body structures, Functions, Activity limitations: Decreased ADL status; Decreased functional mobility ; Decreased posture;Decreased balance;Decreased strength;Decreased ROM; Decreased cognition;Decreased safe awareness;Decreased endurance;Decreased high-level IADLs  Assessment: Patient's mobility today was limited by her c/o dizziness and poor balance. Patient is below her prior level of function and would benefit from skilled PT plan of care. PT recommends that this patient receive skilled PT in the SNF setting, when medically stable, in order to address her therapy deficits and to help her maximize her safety and independence with all functional mobility. PT to continue to follow. Treatment Diagnosis: decreased independence with functional mobility. Prognosis: Fair  Decision Making: Medium Complexity  PT Education: Goals; General Safety;Gait Training;Disease Specific Education;Plan of Care; Functional Mobility Training;Pressure Relief; Injury Prevention;PT Role;Equipment  Patient Education: Disease Specific Education: Patient educated on importance of increased mobility, use of call bell, prevention of complications of bedrest, and general safety during hospitalization. Patient will need education reinforcement. Barriers to Learning: impaired cognition  REQUIRES PT FOLLOW UP: Yes  Activity Tolerance  Activity Tolerance: Patient Tolerated treatment well;Patient limited by endurance; Patient limited by fatigue  Activity Tolerance: /71  HR 76  O2 94%     Patient Diagnosis(es): The primary encounter diagnosis was Generalized weakness.  A diagnosis of Frequent falls was also pertinent to this visit. has a past medical history of Thyroid disease and TIA (transient ischemic attack). has a past surgical history that includes Cataract removal and Hysterectomy. Restrictions  Restrictions/Precautions  Restrictions/Precautions: General Precautions,Fall Risk,Up as Tolerated  Position Activity Restriction  Other position/activity restrictions: taylor White  Subjective   General  Chart Reviewed: Yes  Additional Pertinent Hx: HPI per chart, \"Arabella King is a 80 y.o. female with a history of hypothyroidism, hyponatremia, TIA who presents to the ED complaining of weakness. Patient reports worsening weakness and fatigue over the past week with multiple ground-level falls. She reports some nausea vomiting and diarrhea today however only a few episodes. States she struck her head on the wall during a fall yesterday but did not lose consciousness. CT Head: No acute intracranial abnormality. \"  Response To Previous Treatment: Patient with no complaints from previous session. Family / Caregiver Present: Yes (son)  Referring Practitioner: Juliet Amezquita MD  Subjective  Subjective: Patient agreed to participate. States she is dizzy in bed. General Comment  Comments: Supine in bed upon entry of therapy staff. Cleared for therapy by RN.   /71  HR 76  O2 94%  Pain Screening  Patient Currently in Pain: Denies (at rest)  Vital Signs  Patient Currently in Pain: Denies (at rest)       Orientation  Orientation  Overall Orientation Status: Impaired  Orientation Level: Disoriented to time;Disoriented to person  Cognition      Objective   Bed mobility  Supine to Sit: Minimal assistance  Transfers  Sit to Stand: Minimal Assistance  Stand to sit: Maximum Assistance  Bed to Chair: 2 Person Assistance;Minimal assistance (SPT >chair HHA of 2)  Ambulation  Ambulation?: No (not safe to attempt due to poor balance, impulsive, confused)     Balance  Posture: Poor  Sitting - Static: Fair;+  Sitting - Dynamic: Fair  Standing - Static: Poor;+  Standing - Dynamic: Poor (HHA)  Exercises  Heelslides: 12 B  Gluteal Sets: 10  Hip Flexion: 12 B  Hip Abduction: 15 B  Knee Long Arc Quad: 15 B  Ankle Pumps: 15 B  Comments: long rest breaks for recovery             AM-PAC Score     AM-PAC Inpatient Mobility without Stair Climbing Raw Score : 11 (03/15/22 1504)  AM-PAC Inpatient without Stair Climbing T-Scale Score : 35.66 (03/15/22 1504)  Mobility Inpatient CMS 0-100% Score: 67.36 (03/15/22 1504)  Mobility Inpatient without Stair CMS G-Code Modifier : CL (03/15/22 1504)       Goals  Short term goals  Time Frame for Short term goals: 1 week 3/20/22  Short term goal 1: Supine <> sit with CGA  -03/15 min A  Short term goal 2: Sit <> stand with min assist   -03/15 min A x 2  Short term goal 3: Bed <> chair with least restrictive assistive device and min assist   -03/15 Min A x 2  Short term goal 4: Ambulate 25 feet with least restrictive assistive device and min assist   -03/15 NT  Short term goal 5: By 3/16/22 Patient will tolerate 12-15 reps of her exercises to maximize her strength/endurance   -03/15 progressing  Patient Goals   Patient goals : To have her nausea/vomiting resolve. To get stronger and walk again. Plan    Plan  Times per week: 3-5/week  Plan weeks: 1 week 3/20/22  Specific instructions for Next Treatment: progress mobility as tolerated  Current Treatment Recommendations: Strengthening,Home Exercise Program,Safety Education & Training,ROM,Balance Training,Endurance Training,Patient/Caregiver Education & Training,Equipment Evaluation, Education, & procurement,Functional Mobility Training,Transfer Training,Gait Training,Positioning  Safety Devices  Type of devices:  All fall risk precautions in place,Call light within reach,Nurse notified,Gait belt,Patient at risk for falls,Left in chair,Chair alarm in place (son in rm w/ pt.)     Therapy Time   Individual Concurrent Group Co-treatment   Time In 1348         Time Out 1420         Minutes 32         Timed Code Treatment Minutes: [address]

## 2022-03-15 NOTE — CARE COORDINATION
CM received VM from son Jamie Kuo and he is touring [facility] today at 56. Writer notes that Joel Carey is reflecting cert is still pending at this time.  CM following-Carina Ramon RN

## 2022-03-15 NOTE — PROGRESS NOTES
Occupational Therapy  Facility/Department: [facility] C5 - MED SURG/ORTHO  Daily Treatment Note  NAME: Zoila Cazares  : 1920  MRN: [id number]    Date of Service: 3/15/2022    Discharge Recommendations:  Subacute/Skilled Nursing Facility       Assessment   Performance deficits / Impairments: Decreased functional mobility ; Decreased safe awareness;Decreased balance;Decreased coordination;Decreased ADL status; Decreased strength;Decreased endurance;Decreased cognition;Decreased posture  Assessment: Pt with fair tolerance of OT treatment, very fatigued this date requiring encouragement to participate. Pt requires Ax2 for stand step transfer with HHA due to posterior lean. Pt tolerating 15-20 reps therex with rest beaks. Pt functioning below her baseline and would benefit from SNF at d/c. Prognosis: Fair  OT Education: OT Role;Plan of Care;Transfer Training;Home Exercise Program  Disease Specific Education: Pt educated on importance of OOB mobility, prevention of complications of bedrest, and general safety during hospitalization. Pt verbalized understanding. Barriers to Learning: hard of hearing  REQUIRES OT FOLLOW UP: Yes  Activity Tolerance  Activity Tolerance: Patient limited by fatigue  Activity Tolerance: Vitals: BP= 132/71, HR= 91, SPO2= 98%  Safety Devices  Safety Devices in place: Yes  Type of devices: Left in chair;Chair alarm in place;Call light within reach;Nurse notified;Gait belt         Patient Diagnosis(es): The primary encounter diagnosis was Generalized weakness. A diagnosis of Frequent falls was also pertinent to this visit. has a past medical history of Thyroid disease and TIA (transient ischemic attack). has a past surgical history that includes Cataract removal and Hysterectomy.     Restrictions  Restrictions/Precautions  Restrictions/Precautions: General Precautions,Fall Risk,Up as Tolerated  Position Activity Restriction  Other position/activity restrictions: taylor White Subjective   General  Chart Reviewed: Yes  Patient assessed for rehabilitation services?: Yes  Additional Pertinent Hx: Per H&P \"80 y.o. yo female who  has a past medical history of Thyroid disease and TIA (transient ischemic attack). hypothyroidism presents to [facility] ED complaining of generalized weakness for approximately 1 week, no inciting event, constant getting progressively worse, causing frequent falls in the last week\"; CXray 3/12 - neg.; CT Head 3/12 - neg.; CT abdomen 3/12 - limited study  Response to previous treatment: Patient with no complaints from previous session  Family / Caregiver Present: Yes (son)  Referring Practitioner: Malika Wilkes MD  Diagnosis: Weakness    Subjective  Subjective: Pt resting in bed, agreeable to OT treatment. Vital Signs  Patient Currently in Pain: Denies (at rest)     Orientation  Orientation  Orientation Level: Oriented to person;Oriented to place     Objective    Balance  Sitting Balance: Moderate assistance (varies from CGA-mod A posterior lean)  Standing Balance: Dependent/Total (mod A of 2 hand held A)  Standing Balance  Activity: bed to chair stand step transfer; static stand    Bed mobility  Supine to Sit: Minimal assistance (to R with HOB elevated)     Transfers  Sit to stand: Dependent/Total;2 Person assistance (min-mod A of 2)  Stand to sit: Dependent/Total;2 Person assistance (min-mod A of 2)     Cognition  Overall Cognitive Status: Exceptions  Arousal/Alertness: Appropriate responses to stimuli  Following Commands: Follows one step commands with increased time; Follows one step commands with repetition  Attention Span: Attends with cues to redirect  Safety Judgement: Decreased awareness of need for safety;Decreased awareness of need for assistance  Insights: Decreased awareness of deficits  Initiation: Requires cues for some  Sequencing: Requires cues for some  Cognition Comment: Pt is hard of hearing     Type of ROM/Therapeutic Exercise  Type of ROM/Therapeutic Exercise: AROM  Comment: seated in chair  Exercises  Shoulder Flexion: 15x  Elbow Flexion: 15x  Elbow Extension: 15x  Supination: 20x  Pronation: 20x  Wrist Flexion: 20x  Wrist Extension: 20x  Finger Flexion: 20x  Finger Extension: 20x     Plan   Plan  Times per week: 3-5x  Current Treatment Recommendations: Endurance Training,Strengthening,Patient/Caregiver Education & Training,Balance Training,Functional Mobility Training,Safety Education & Training,Self-Care / ADL    AM-PAC Score  AM-PAC Inpatient Daily Activity Raw Score: 13 (03/15/22 1511)  AM-PAC Inpatient ADL T-Scale Score : 32.03 (03/15/22 1511)  ADL Inpatient CMS 0-100% Score: 63.03 (03/15/22 1511)  ADL Inpatient CMS G-Code Modifier : CL (03/15/22 1511)    Goals  Short term goals  Time Frame for Short term goals: 1 week (3/13/22)  Short term goal 1: Pt will complete toileting Alex-- ongoing 3/15/22  Short term goal 2: Pt will complete LB dressing Alex-- ongoing 3/15/22  Short term goal 3: Pt will complete toilet transfer Alex-- ongoing 3/15/22  Short term goal 4: Pt will increase stance tolerance to x6 mins for ADLs with CGA-- ongoing 3/15/22  Short term goal 5: Pt will tolerate BUE therex x15 for improved strength/endurance-- ongoing 3/15/22  Patient Goals   Patient goals : \"to get stronger\"       Therapy Time   Individual Concurrent Group Co-treatment   Time In 1430         Time Out 1455         Minutes [address] [facility], OSORIO/L

## 2022-03-15 NOTE — DISCHARGE SUMMARY
Hospital Medicine Discharge Summary    Patient ID: Joyceann Faviola      Patient's PCP: Sánchez Moctezuma MD    Admit Date: 3/12/2022     Discharge Date:   03/15/22    Admitting Provider: Hiral Sampson MD     Discharge Provider: Stephan Delvalle MD     Discharge Diagnoses: Active Hospital Problems    Diagnosis     Weakness [R53.1]        The patient was seen and examined on day of discharge and this discharge summary is in conjunction with any daily progress note from day of discharge. Hospital Course:   80 y.o. yo female who  has a past medical history of Thyroid disease and TIA (transient ischemic attack). hypothyroidism presents to [place] [address] ED complaining of generalized weakness for approximately 1 week, no inciting event, constant getting progressively worse, causing frequent falls in the last week.  No exacerbating relieving factors.  Moderately severe, generalized and nonradiating.  Compliant with Synthroid.  In the emergency department metabolic panel and hemogram are grossly unremarkable.  Admit to observation for PT/OT and case management to evaluate for possible either home health or inpatient rehab placement. Age related debility  - TSH WNL  - dietary supplements  - PT/OT     Hypothyroidism  - TSH WNL  - continue home synthroid    Physical Exam Performed:     /81   Pulse 81   Temp 97.8 °F (36.6 °C) (Oral)   Resp 16   Ht 5' 5\" (1.651 m)   Wt 120 lb (54.4 kg)   SpO2 95%   BMI 19.97 kg/m²       General appearance: No apparent distress, appears stated age and cooperative. HEENT: Pupils equal, round, and reactive to light. Conjunctivae/corneas clear. Neck: Supple, with full range of motion. No jugular venous distention. Trachea midline. Respiratory:  Normal respiratory effort. Clear to auscultation, bilaterally without Rales/Wheezes/Rhonchi. Cardiovascular: Regular rate and rhythm with normal S1/S2 without murmurs, rubs or gallops.   Abdomen: Soft, non-tender, non-distended with normal bowel sounds. Musculoskeletal: No clubbing, cyanosis or edema bilaterally. Full range of motion without deformity. Skin: Skin color, texture, turgor normal.  No rashes or lesions. Neurologic:  Neurovascularly intact without any focal sensory/motor deficits. Cranial nerves: II-XII intact, grossly non-focal.  Psychiatric: Alert and oriented, pleasant. Capillary Refill: Brisk,3 seconds, normal   Peripheral Pulses: +2 palpable, equal bilaterally     Labs: For convenience and continuity at follow-up the following most recent labs are provided:      CBC:    Lab Results   Component Value Date    WBC 3.3 03/14/2022    HGB 11.2 03/14/2022    HCT 33.8 03/14/2022     03/14/2022       Renal:    Lab Results   Component Value Date     03/14/2022    K 4.3 03/14/2022    K 3.8 03/13/2022     03/14/2022    CO2 26 03/14/2022    BUN 23 03/14/2022    CREATININE 0.6 03/14/2022    CALCIUM 9.0 03/14/2022    PHOS 2.9 07/23/2017         Significant Diagnostic Studies    Radiology:   CT Head WO Contrast   Final Result   No acute intracranial abnormality. Stable chronic atrophic and ischemic white matter changes         CT ABDOMEN PELVIS W IV CONTRAST Additional Contrast? None   Final Result   Evaluation was limited due to streak beam hardening artifact throughout and   lack of intra-abdominal-intrapelvic fat. A moderate sized fixed para   esophageal hernia was noted. No renal mass or hydronephrosis was identified. No colitis or diverticulitis was seen. XR CHEST PORTABLE   Final Result      1. No acute cardiopulmonary abnormality.                 Consults:     IP CONSULT TO HOSPITALIST  IP CONSULT TO CASE MANAGEMENT    Disposition:  [facility]     Condition at Discharge: Stable    Discharge Instructions/Follow-up:  Follow up with PCP within 1-2 weeks    Code Status:  Full Code     Activity: activity as tolerated    Diet: regular diet      Discharge Medications:     Current Discharge Medication List           Details   ondansetron (ZOFRAN-ODT) 4 MG disintegrating tablet Take 1 tablet by mouth every 8 hours as needed for Nausea or Vomiting  Qty: 21 tablet, Refills: 0              Details   pantoprazole (PROTONIX) 40 MG tablet Take 40 mg by mouth 2 times daily      oxybutynin (DITROPAN-XL) 5 MG extended release tablet Take 5 mg by mouth daily Every night      aspirin 81 MG EC tablet Take 81 mg by mouth daily      vitamin B-12 (CYANOCOBALAMIN) 1000 MCG tablet Take 1,000 mcg by mouth daily      docusate sodium (COLACE) 100 MG capsule Take 100 mg by mouth 2 times daily      polyethylene glycol (GLYCOLAX) 17 GM/SCOOP powder Take 17 g by mouth 2 times daily      psyllium (METAMUCIL) 58.6 % packet Take 1 packet by mouth daily      Multiple Vitamins-Minerals (MULTIVITAMIN ADULTS PO) Take by mouth      levothyroxine (SYNTHROID) 100 MCG tablet Take 100 mcg by mouth every morning (before breakfast)      triamcinolone (KENALOG) 0.1 % ointment       magnesium gluconate (MAGONATE) 500 MG tablet Take 500 mg by mouth daily      nystatin (MYCOSTATIN) 817186 UNIT/GM cream Apply topically 2 times daily Apply topically 2 times daily. hydrocortisone 2.5 % ointment Apply topically 2 times daily Apply topically 2 times daily. Time Spent on discharge is more than 20 minutes in the examination, evaluation, counseling and review of medications and discharge plan. Signed:    Pebbles Monk MD   3/15/2022      Thank you Chelo Miles MD for the opportunity to be involved in this patient's care. If you have any questions or concerns please feel free to contact me at [phone].

## 2023-01-30 ENCOUNTER — APPOINTMENT (OUTPATIENT)
Dept: CT IMAGING | Age: 88
DRG: 689 | End: 2023-01-30
Payer: MEDICARE

## 2023-01-30 ENCOUNTER — APPOINTMENT (OUTPATIENT)
Dept: GENERAL RADIOLOGY | Age: 88
DRG: 689 | End: 2023-01-30
Payer: MEDICARE

## 2023-01-30 ENCOUNTER — HOSPITAL ENCOUNTER (INPATIENT)
Age: 88
LOS: 2 days | Discharge: NURSING, REHAB OR LONG-TERM CARE | DRG: 689 | End: 2023-02-01
Attending: EMERGENCY MEDICINE | Admitting: INTERNAL MEDICINE
Payer: MEDICARE

## 2023-01-30 DIAGNOSIS — R53.1 GENERAL WEAKNESS: Primary | ICD-10-CM

## 2023-01-30 DIAGNOSIS — R09.89 SYMPTOMS OF CEREBROVASCULAR ACCIDENT (CVA): ICD-10-CM

## 2023-01-30 PROBLEM — R47.81 SLURRED SPEECH: Status: ACTIVE | Noted: 2023-01-30

## 2023-01-30 LAB
A/G RATIO: 1.2 (ref 1.1–2.2)
ALBUMIN SERPL-MCNC: 3.6 G/DL (ref 3.4–5)
ALP BLD-CCNC: 90 U/L (ref 40–129)
ALT SERPL-CCNC: 9 U/L (ref 10–40)
AMPHETAMINE SCREEN, URINE: NORMAL
ANION GAP SERPL CALCULATED.3IONS-SCNC: 9 MMOL/L (ref 3–16)
AST SERPL-CCNC: 12 U/L (ref 15–37)
BACTERIA: ABNORMAL /HPF
BARBITURATE SCREEN URINE: NORMAL
BASOPHILS ABSOLUTE: 0 K/UL (ref 0–0.2)
BASOPHILS RELATIVE PERCENT: 1.1 %
BENZODIAZEPINE SCREEN, URINE: NORMAL
BILIRUB SERPL-MCNC: <0.2 MG/DL (ref 0–1)
BILIRUBIN URINE: NEGATIVE
BLOOD, URINE: ABNORMAL
BUN BLDV-MCNC: 28 MG/DL (ref 7–20)
CALCIUM SERPL-MCNC: 9.1 MG/DL (ref 8.3–10.6)
CANNABINOID SCREEN URINE: NORMAL
CHLORIDE BLD-SCNC: 104 MMOL/L (ref 99–110)
CLARITY: CLEAR
CO2: 22 MMOL/L (ref 21–32)
COCAINE METABOLITE SCREEN URINE: NORMAL
COLOR: YELLOW
CREAT SERPL-MCNC: 0.7 MG/DL (ref 0.6–1.2)
EKG ATRIAL RATE: 76 BPM
EKG DIAGNOSIS: NORMAL
EKG P AXIS: 47 DEGREES
EKG P-R INTERVAL: 172 MS
EKG Q-T INTERVAL: 400 MS
EKG QRS DURATION: 88 MS
EKG QTC CALCULATION (BAZETT): 450 MS
EKG R AXIS: -22 DEGREES
EKG T AXIS: 38 DEGREES
EKG VENTRICULAR RATE: 76 BPM
EOSINOPHILS ABSOLUTE: 0.2 K/UL (ref 0–0.6)
EOSINOPHILS RELATIVE PERCENT: 4.7 %
EPITHELIAL CELLS, UA: ABNORMAL /HPF (ref 0–5)
FENTANYL SCREEN, URINE: NORMAL
GFR SERPL CREATININE-BSD FRML MDRD: >60 ML/MIN/{1.73_M2}
GLUCOSE BLD-MCNC: 88 MG/DL (ref 70–99)
GLUCOSE BLD-MCNC: 98 MG/DL (ref 70–99)
GLUCOSE URINE: NEGATIVE MG/DL
HCT VFR BLD CALC: 34.2 % (ref 36–48)
HEMOGLOBIN: 11.3 G/DL (ref 12–16)
KETONES, URINE: NEGATIVE MG/DL
LACTIC ACID: 1 MMOL/L (ref 0.4–2)
LACTIC ACID: 1.3 MMOL/L (ref 0.4–2)
LEUKOCYTE ESTERASE, URINE: NEGATIVE
LYMPHOCYTES ABSOLUTE: 0.5 K/UL (ref 1–5.1)
LYMPHOCYTES RELATIVE PERCENT: 12.5 %
Lab: NORMAL
MCH RBC QN AUTO: 31 PG (ref 26–34)
MCHC RBC AUTO-ENTMCNC: 33 G/DL (ref 31–36)
MCV RBC AUTO: 94.2 FL (ref 80–100)
METHADONE SCREEN, URINE: NORMAL
MICROSCOPIC EXAMINATION: YES
MONOCYTES ABSOLUTE: 0.4 K/UL (ref 0–1.3)
MONOCYTES RELATIVE PERCENT: 11.4 %
NEUTROPHILS ABSOLUTE: 2.7 K/UL (ref 1.7–7.7)
NEUTROPHILS RELATIVE PERCENT: 70.3 %
NITRITE, URINE: POSITIVE
OPIATE SCREEN URINE: NORMAL
OXYCODONE URINE: NORMAL
PDW BLD-RTO: 15.8 % (ref 12.4–15.4)
PERFORMED ON: NORMAL
PH UA: 6
PH UA: 6 (ref 5–8)
PHENCYCLIDINE SCREEN URINE: NORMAL
PLATELET # BLD: 233 K/UL (ref 135–450)
PMV BLD AUTO: 7.1 FL (ref 5–10.5)
POTASSIUM REFLEX MAGNESIUM: 4.4 MMOL/L (ref 3.5–5.1)
PROCALCITONIN: 0.12 NG/ML (ref 0–0.15)
PROTEIN UA: NEGATIVE MG/DL
RBC # BLD: 3.63 M/UL (ref 4–5.2)
RBC UA: ABNORMAL /HPF (ref 0–4)
SODIUM BLD-SCNC: 135 MMOL/L (ref 136–145)
SPECIFIC GRAVITY UA: 1.02 (ref 1–1.03)
T4 FREE: 1.1 NG/DL (ref 0.9–1.8)
TOTAL PROTEIN: 6.6 G/DL (ref 6.4–8.2)
TROPONIN: <0.01 NG/ML
TROPONIN: <0.01 NG/ML
TSH REFLEX: 9.34 UIU/ML (ref 0.27–4.2)
URINE REFLEX TO CULTURE: ABNORMAL
URINE TYPE: ABNORMAL
UROBILINOGEN, URINE: 0.2 E.U./DL
WBC # BLD: 3.9 K/UL (ref 4–11)
WBC UA: ABNORMAL /HPF (ref 0–5)

## 2023-01-30 PROCEDURE — 83605 ASSAY OF LACTIC ACID: CPT

## 2023-01-30 PROCEDURE — 93010 ELECTROCARDIOGRAM REPORT: CPT | Performed by: INTERNAL MEDICINE

## 2023-01-30 PROCEDURE — 80307 DRUG TEST PRSMV CHEM ANLYZR: CPT

## 2023-01-30 PROCEDURE — 6370000000 HC RX 637 (ALT 250 FOR IP): Performed by: INTERNAL MEDICINE

## 2023-01-30 PROCEDURE — 6360000002 HC RX W HCPCS: Performed by: INTERNAL MEDICINE

## 2023-01-30 PROCEDURE — 6360000004 HC RX CONTRAST MEDICATION: Performed by: EMERGENCY MEDICINE

## 2023-01-30 PROCEDURE — 87086 URINE CULTURE/COLONY COUNT: CPT

## 2023-01-30 PROCEDURE — 81001 URINALYSIS AUTO W/SCOPE: CPT

## 2023-01-30 PROCEDURE — 93005 ELECTROCARDIOGRAM TRACING: CPT | Performed by: EMERGENCY MEDICINE

## 2023-01-30 PROCEDURE — 70450 CT HEAD/BRAIN W/O DYE: CPT

## 2023-01-30 PROCEDURE — 84443 ASSAY THYROID STIM HORMONE: CPT

## 2023-01-30 PROCEDURE — 1200000000 HC SEMI PRIVATE

## 2023-01-30 PROCEDURE — 87077 CULTURE AEROBIC IDENTIFY: CPT

## 2023-01-30 PROCEDURE — 70496 CT ANGIOGRAPHY HEAD: CPT

## 2023-01-30 PROCEDURE — 84439 ASSAY OF FREE THYROXINE: CPT

## 2023-01-30 PROCEDURE — 71045 X-RAY EXAM CHEST 1 VIEW: CPT

## 2023-01-30 PROCEDURE — 80053 COMPREHEN METABOLIC PANEL: CPT

## 2023-01-30 PROCEDURE — 84145 PROCALCITONIN (PCT): CPT

## 2023-01-30 PROCEDURE — 85025 COMPLETE CBC W/AUTO DIFF WBC: CPT

## 2023-01-30 PROCEDURE — 99285 EMERGENCY DEPT VISIT HI MDM: CPT

## 2023-01-30 PROCEDURE — 87186 SC STD MICRODIL/AGAR DIL: CPT

## 2023-01-30 PROCEDURE — 84484 ASSAY OF TROPONIN QUANT: CPT

## 2023-01-30 PROCEDURE — 36415 COLL VENOUS BLD VENIPUNCTURE: CPT

## 2023-01-30 RX ORDER — LEVOTHYROXINE SODIUM 0.1 MG/1
100 TABLET ORAL
Status: DISCONTINUED | OUTPATIENT
Start: 2023-01-31 | End: 2023-02-01 | Stop reason: HOSPADM

## 2023-01-30 RX ORDER — HEPARIN SODIUM 5000 [USP'U]/ML
5000 INJECTION, SOLUTION INTRAVENOUS; SUBCUTANEOUS 2 TIMES DAILY
Status: DISCONTINUED | OUTPATIENT
Start: 2023-01-30 | End: 2023-02-01 | Stop reason: HOSPADM

## 2023-01-30 RX ORDER — ASPIRIN 81 MG/1
81 TABLET ORAL DAILY
Status: DISCONTINUED | OUTPATIENT
Start: 2023-01-30 | End: 2023-01-30 | Stop reason: SDUPTHER

## 2023-01-30 RX ORDER — PANTOPRAZOLE SODIUM 40 MG/1
40 TABLET, DELAYED RELEASE ORAL
Status: DISCONTINUED | OUTPATIENT
Start: 2023-01-31 | End: 2023-02-01 | Stop reason: HOSPADM

## 2023-01-30 RX ORDER — POLYETHYLENE GLYCOL 3350 17 G/17G
17 POWDER, FOR SOLUTION ORAL DAILY PRN
Status: DISCONTINUED | OUTPATIENT
Start: 2023-01-30 | End: 2023-02-01 | Stop reason: HOSPADM

## 2023-01-30 RX ORDER — ASPIRIN 300 MG/1
300 SUPPOSITORY RECTAL DAILY
Status: DISCONTINUED | OUTPATIENT
Start: 2023-01-30 | End: 2023-02-01 | Stop reason: HOSPADM

## 2023-01-30 RX ORDER — ASPIRIN 81 MG/1
81 TABLET ORAL DAILY
Status: DISCONTINUED | OUTPATIENT
Start: 2023-01-30 | End: 2023-02-01 | Stop reason: HOSPADM

## 2023-01-30 RX ORDER — DOCUSATE SODIUM 100 MG/1
100 CAPSULE, LIQUID FILLED ORAL 2 TIMES DAILY
Status: DISCONTINUED | OUTPATIENT
Start: 2023-01-30 | End: 2023-02-01 | Stop reason: HOSPADM

## 2023-01-30 RX ORDER — ONDANSETRON 2 MG/ML
4 INJECTION INTRAMUSCULAR; INTRAVENOUS EVERY 6 HOURS PRN
Status: DISCONTINUED | OUTPATIENT
Start: 2023-01-30 | End: 2023-02-01 | Stop reason: HOSPADM

## 2023-01-30 RX ORDER — LANOLIN ALCOHOL/MO/W.PET/CERES
500 CREAM (GRAM) TOPICAL DAILY
Status: DISCONTINUED | OUTPATIENT
Start: 2023-01-30 | End: 2023-02-01 | Stop reason: HOSPADM

## 2023-01-30 RX ORDER — ATORVASTATIN CALCIUM 10 MG/1
10 TABLET, FILM COATED ORAL NIGHTLY
Status: DISCONTINUED | OUTPATIENT
Start: 2023-01-30 | End: 2023-02-01 | Stop reason: HOSPADM

## 2023-01-30 RX ORDER — HYDRALAZINE HYDROCHLORIDE 20 MG/ML
10 INJECTION INTRAMUSCULAR; INTRAVENOUS EVERY 6 HOURS PRN
Status: DISCONTINUED | OUTPATIENT
Start: 2023-01-30 | End: 2023-02-01 | Stop reason: HOSPADM

## 2023-01-30 RX ORDER — ONDANSETRON 4 MG/1
4 TABLET, ORALLY DISINTEGRATING ORAL EVERY 8 HOURS PRN
Status: DISCONTINUED | OUTPATIENT
Start: 2023-01-30 | End: 2023-02-01 | Stop reason: HOSPADM

## 2023-01-30 RX ADMIN — ATORVASTATIN CALCIUM 10 MG: 10 TABLET, FILM COATED ORAL at 20:01

## 2023-01-30 RX ADMIN — HEPARIN SODIUM 5000 UNITS: 5000 INJECTION INTRAVENOUS; SUBCUTANEOUS at 20:01

## 2023-01-30 RX ADMIN — IOPAMIDOL 75 ML: 755 INJECTION, SOLUTION INTRAVENOUS at 14:45

## 2023-01-30 RX ADMIN — ASPIRIN 81 MG: 81 TABLET, COATED ORAL at 18:57

## 2023-01-30 RX ADMIN — MAGNESIUM OXIDE TAB 400 MG (240 MG ELEMENTAL MG) 500 MG: 400 (240 MG) TAB at 18:56

## 2023-01-30 RX ADMIN — DOCUSATE SODIUM 100 MG: 100 CAPSULE, LIQUID FILLED ORAL at 20:01

## 2023-01-30 ASSESSMENT — PAIN - FUNCTIONAL ASSESSMENT
PAIN_FUNCTIONAL_ASSESSMENT: NONE - DENIES PAIN
PAIN_FUNCTIONAL_ASSESSMENT: NONE - DENIES PAIN

## 2023-01-30 NOTE — ED NOTES
While obtaining MEWS score prior to transport, patient has become increasingly hypertensive. Last BP obtained reading,  207/99. PS sent to Dr. Beau Spears: \"Hi Dr. Iavn Alvarez, Patient in ED bed 1 (going to B3) has becoming increasingly hypertensive. BP is currently 207/99; BP has been checked in both arms multiple times. Pt son unsure if she took her bp medication this morning. \"     Krish Collazo RN  01/30/23 7029

## 2023-01-30 NOTE — PROGRESS NOTES
4 Eyes Skin Assessment     The patient is being assess for   Admission    I agree that 2 RN's have performed a thorough Head to Toe Skin Assessment on the patient. ALL assessment sites listed below have been assessed. Areas assessed for pressure by both nurses:   [x]   Head, Face, and Ears   [x]   Shoulders, Back, and Chest, Abdomen  [x]   Arms, Elbows, and Hands   [x]   Coccyx, Sacrum, and Ischium  [x]   Legs, Feet, and Heels        Skin Assessed Under all Medical Devices by both nurses:  None                All Mepilex Borders were peeled back and area peeked at by both nurses:  No: none   Please list where Mepilex Borders are located:  meplilex placed on coccyx              **SHARE this note so that the co-signing nurse is able to place an eSignature**    Co-signer eSignature: Electronically signed by Edil Norris RN on 1/30/23 at 6:51 PM EST    Does the Patient have Skin Breakdown related to pressure? Blanchable redness on heels and coccyx.               Harpreet Prevention initiated:  Yes   Wound Care Orders initiated:  No      [facility] nurse consulted for Pressure Injury (Stage 3,4, Unstageable, DTI, NWPT, Complex wounds)and New or Established Ostomies:  No      Primary Nurse eSignature: Electronically signed by Harris Baird RN on 1/30/23 at 6:41 PM EST

## 2023-01-30 NOTE — ED NOTES
Karen Jackson, ED tech, at bedside to perform straight cath. Sterile technique used;  75 mL of urine emptied from bladder. Urine sent to lab for further testing.       Armin Amado RN  01/30/23 0791

## 2023-01-30 NOTE — ED PROVIDER NOTES
[facility]  ED  EMERGENCY DEPARTMENT ENCOUNTER        Patient Name: Veronika Ponce  MRN: [id number]  [birth year]abi 12/18/1920  Date of evaluation: 1/30/2023  Provider: Belle Salinas MD  PCP: Montse Mitchell MD  Note Started: 1:21 PM EST 1/30/23    CHIEF COMPLAINT       Fatigue (Mental status decline per son, )      HISTORY OF PRESENT ILLNESS: 1 or more Elements     History from : Patient, Family son, and EMS    Limitations to history : None    Veronika Ponce is a 80 y.o. female who presents for evaluation of general weakness, altered mental status, speech changes. According to son, patient has had difficulty speaking today. She last saw her yesterday afternoon which was her last known normal.  He states about 5 or 6 days ago she had a period of slurred speech, dizziness that resolved on its own. No prior history of stroke. She is living in independent living at [facility] however because of these new symptoms, she is no longer independent. Patient denies any chest pain or difficulty breathing, she is alert and oriented upon arrival.    Nursing Notes were all reviewed and agreed with or any disagreements were addressed in the HPI. REVIEW OF SYSTEMS :      Review of Systems    Positives and Pertinent negatives as per HPI. SURGICAL HISTORY     Past Surgical History:   Procedure Laterality Date    CATARACT REMOVAL      OU    HYSTERECTOMY (CERVIX STATUS UNKNOWN)         CURRENTMEDICATIONS       Previous Medications    ASPIRIN 81 MG EC TABLET    Take 81 mg by mouth daily    DOCUSATE SODIUM (COLACE) 100 MG CAPSULE    Take 100 mg by mouth 2 times daily    HYDROCORTISONE 2.5 % OINTMENT    Apply topically 2 times daily Apply topically 2 times daily.     LEVOTHYROXINE (SYNTHROID) 100 MCG TABLET    Take 100 mcg by mouth every morning (before breakfast)    MAGNESIUM GLUCONATE (MAGONATE) 500 MG TABLET    Take 500 mg by mouth daily    MULTIPLE VITAMINS-MINERALS (MULTIVITAMIN ADULTS PO) Take by mouth    NYSTATIN (MYCOSTATIN) 535582 UNIT/GM CREAM    Apply topically 2 times daily Apply topically 2 times daily. ONDANSETRON (ZOFRAN-ODT) 4 MG DISINTEGRATING TABLET    Take 1 tablet by mouth every 8 hours as needed for Nausea or Vomiting    OXYBUTYNIN (DITROPAN-XL) 5 MG EXTENDED RELEASE TABLET    Take 5 mg by mouth daily Every night    PANTOPRAZOLE (PROTONIX) 40 MG TABLET    Take 40 mg by mouth 2 times daily    POLYETHYLENE GLYCOL (GLYCOLAX) 17 GM/SCOOP POWDER    Take 17 g by mouth 2 times daily    PSYLLIUM (METAMUCIL) 58.6 % PACKET    Take 1 packet by mouth daily    TRIAMCINOLONE (KENALOG) 0.1 % OINTMENT        VITAMIN B-12 (CYANOCOBALAMIN) 1000 MCG TABLET    Take 1,000 mcg by mouth daily       ALLERGIES     Sulfa antibiotics, Cephalexin, Hydrocodone-acetaminophen, Trazodone, and Trimethoprim    FAMILYHISTORY       Family History   Problem Relation Age of Onset    Arthritis Mother     Hearing Loss Father     Arthritis Sister         SOCIAL HISTORY       Social History     Tobacco Use    Smoking status: Never    Smokeless tobacco: Never   Substance Use Topics    Alcohol use: No    Drug use: No       SCREENINGS   NIH Stroke Scale  Interval: Baseline  Level of Consciousness (1a): Alert  LOC Questions (1b): Answers both correctly  LOC Commands (1c): Performs both tasks correctly  Best Gaze (2): Normal  Visual (3): No visual loss  Facial Palsy (4): Normal symmetrical movement  Motor Arm, Left (5a): No drift  Motor Arm, Right (5b): No drift  Motor Leg, Left (6a): No drift  Motor Leg, Right (6b):  No drift  Limb Ataxia (7):  (patient unable to follow this command)  Sensory (8): Normal  Best Language (9):  (unable to assess; unsure of baseline)  Dysarthria (10):  (unable to assess; unsure of baseline)  Extinction and Inattention (11): No abnormality    [place] Coma Scale  Eye Opening: Spontaneous  Best Verbal Response: Oriented  Best Motor Response: Obeys commands  [place] Coma Scale Score: 15 [facility] Assessment  BP: (!) 174/73  Heart Rate: 78           PHYSICAL EXAM  1 or more Elements     ED Triage Vitals [01/30/23 1248]   BP Temp Temp Source Heart Rate Resp SpO2 Height Weight   (!) 161/79 98.3 °F (36.8 °C) Oral 73 18 95 % -- 100 lb (45.4 kg)       General: No acute distress. Alert and Oriented. Appears stated age. HEENT:. No difficulty tolerating oral secretions. Cardiac: Regular rate and rhythm. Radial pulses are intact bilaterally. Chest: No respiratory distress. Clear breath sounds bilaterally. No increased work of breathing. No use of accessory muscles for respiration. Abdomen: Soft, nontender, nondistended, non-peritonitic. Extremities:No significant lower extremity edema. Lower extremities are symmetric. Neuro: Moving all extremities. No focal deficits. Speech is somewhat slow, occasionally slurred. Cranial nerves II through XII are otherwise intact. Moving all 4 extremities. Skin:No rash, no erythema  Psych: Calm and cooperative.       DIAGNOSTIC RESULTS   LABS:    Labs Reviewed   CBC WITH AUTO DIFFERENTIAL - Abnormal; Notable for the following components:       Result Value    WBC 3.9 (*)     RBC 3.63 (*)     Hemoglobin 11.3 (*)     Hematocrit 34.2 (*)     RDW 15.8 (*)     Lymphocytes Absolute 0.5 (*)     All other components within normal limits   COMPREHENSIVE METABOLIC PANEL W/ REFLEX TO MG FOR LOW K - Abnormal; Notable for the following components:    Sodium 135 (*)     BUN 28 (*)     ALT 9 (*)     AST 12 (*)     All other components within normal limits   URINALYSIS WITH REFLEX TO CULTURE - Abnormal; Notable for the following components:    Blood, Urine TRACE-INTACT (*)     Nitrite, Urine POSITIVE (*)     All other components within normal limits   MICROSCOPIC URINALYSIS - Abnormal; Notable for the following components:    RBC, UA 5-10 (*)     Bacteria, UA 4+ (*)     All other components within normal limits   LACTIC ACID   URINE DRUG SCREEN   LACTIC ACID   TSH WITH REFLEX   POCT GLUCOSE   POCT GLUCOSE       When ordered only abnormal lab results are displayed. All other labs were within normal range or not returned as of this dictation. EKG  The EKG, as interpreted by myself, in the emergency department in the absence of a cardiologist.  normal sinus rhythm with a rate of 76  Axis is   Normal  QTc is   450  Intervals and Durations are unremarkable. No specific ST-T wave changes appreciated. T Wave flattening in lead III, aVF  No evidence of acute ischemia. No significant change from prior EKG dated 3/12/2022      RADIOLOGY:   Non-plain film images such as CT, Ultrasound and MRI are read by the radiologist. Plain radiographic images are visualized and preliminarily interpreted by the ED Provider with the below findings:    Chest x-ray without acute process    Interpretation per the Radiologist below, if available at the time of this note:    XR CHEST PORTABLE   Final Result   1. No acute cardiopulmonary findings. 2.  Moderate hiatal hernia. CT HEAD WO CONTRAST    (Results Pending)   CTA HEAD NECK W CONTRAST    (Results Pending)     No results found. Bedside Ultrasound, as interpreted by me, if performed:    No results found. PROCEDURES     Unless otherwise noted below, none     Procedures    CRITICAL CARE TIME     I personally spent a total of 5 minutes of critical care time in obtaining history, performing a physical exam, bedside monitoring of interventions, collecting and interpreting tests and discussion with consultants but excluding time spent performing procedures, treating other patients and teaching time. PAST MEDICAL HISTORY      has a past medical history of Thyroid disease and TIA (transient ischemic attack) (1980's).      EMERGENCY DEPARTMENT COURSE and DIFFERENTIAL DIAGNOSIS/MDM:     Vitals:    Vitals:    01/30/23 1248 01/30/23 1415   BP: (!) 161/79 (!) 174/73   Pulse: 73 78   Resp: 18 21   Temp: 98.3 °F (36.8 °C)    TempSrc: Oral    SpO2: 95% 99%   Weight: 100 lb (45.4 kg)        Patient was treated with and given the following medications:  Medications   iopamidol (ISOVUE-370) 76 % injection 75 mL (75 mLs IntraVENous Given 1/30/23 1445)             Is this patient to be included in the SEP-1 Core Measure due to severe sepsis or septic shock? No   Exclusion criteria - the patient is NOT to be included for SEP-1 Core Measure due to: Infection is not suspected    CC/HPI Summary, DDx, ED Course, and Reassessment:     8-year-old female presenting with general weakness, potentially slurred and change speech. She otherwise has no focal neurological symptoms. Last known well time was yesterday afternoon so she is well outside of stroke intervention window and she has fairly minimal symptoms at this time which seem to be waxing and waning. We will plan for stroke work-up with CT CTA imaging, laboratory evaluation, infectious work-up. The differential diagnosis associated with the patient's presentation includes: Electrolyte normality, dehydration, stroke, TIA, infectious process, pneumonia, urinary tract infection    CONSULTS: (Who and What was discussed)  None    Discussion with Other Professionals : Admitting Team          Social Determinants : None    Patient's care impacted by chronic condition(s): Hypertension    Records Reviewed : None    Clinical information obtained from an independent historian. History obtained from or confirmed by son    CT of the head does not reveal acute intracranial bleed. Urinalysis does reveal positive nitrite, no significant leukocytes, there is 4+ bacteria. This could be early urinary tract infection. Culture is pending. I had a long discussion with the patient's son regarding her symptoms.   If she does have a stroke, her symptoms seem to be fairly minimal but it does not seem that she no longer would be appropriate for independent care at her living facility. Son would like further evaluation and stroke work-up including potential MRI for further evaluation of symptoms to help with discharge planning and next steps in her care. Patient will require admission for further management of the symptoms. I am the Primary Clinician of Record. FINAL IMPRESSION      1. General weakness    2. Symptoms of cerebrovascular accident (CVA)          DISPOSITION/PLAN     DISPOSITION Decision To Admit 01/30/2023 03:00:50 PM      PATIENT REFERRED TO:  No follow-up provider specified. DISCHARGE MEDICATIONS:  Patient was given scripts for the following medications. I counseled patient how to take these medications:  New Prescriptions    No medications on file       DISCONTINUED MEDICATIONS:  Discontinued Medications    No medications on file              (This chart was generated in part by using Dragon Dictation system and may contain errors related to that system including errors in grammar, punctuation, and spelling, as well as words and phrases that may be inappropriate.  If there are any questions or concerns please feel free to contact the dictating provider for clarification.)    MD Deshawn Cisneros MD  01/30/23 7779

## 2023-01-30 NOTE — H&P
Hospital Medicine History & Physical      PCP: Yair Ramirez MD    Date of Admission: 1/30/2023    Date of Service: Pt seen/examined on 1/30/2023 and Admitted to Inpatient with expected LOS greater than two midnights due to medical therapy. Chief Complaint:   slurred speech x 1 day      History Of Present Illness:    80 y.o. female who presented to [facility] with  above c/o . History is from son. She lives in [facility] and has mild memory impairment. She is very hard of hearing and very difficult to communicate at this time. Her speech is slowed. Son was with her last night and she was at her baseline. Staff found her around noon having slurred speech. Speech is not clear at this time. She denies headache dizziness chest pain cough sputum or any focal neurological weakness. She had a spell of dizziness and slurred speech a week ago and spontaneously resolved    Past Medical History:          Diagnosis Date    Thyroid disease     TIA (transient ischemic attack) 1980's       Past Surgical History:          Procedure Laterality Date    CATARACT REMOVAL      OU    HYSTERECTOMY (CERVIX STATUS UNKNOWN)         Medications Prior to Admission:      Prior to Admission medications    Medication Sig Start Date End Date Taking?  Authorizing Provider   diclofenac sodium (VOLTAREN) 1 % GEL Apply 4 g topically 4 times daily 9/9/21  Yes Historical Provider, MD   ondansetron (ZOFRAN-ODT) 4 MG disintegrating tablet Take 1 tablet by mouth every 8 hours as needed for Nausea or Vomiting 3/15/22   Pravin Maurer MD   pantoprazole (PROTONIX) 40 MG tablet Take 40 mg by mouth 2 times daily 4/28/21   Historical Provider, MD   triamcinolone (KENALOG) 0.1 % ointment  1/25/22   Historical Provider, MD   oxybutynin (DITROPAN-XL) 5 MG extended release tablet Take 5 mg by mouth daily Every night  Patient not taking: Reported on 1/30/2023    Historical Provider, MD   aspirin 81 MG EC tablet Take 81 mg by mouth daily    Historical Provider, MD   vitamin B-12 (CYANOCOBALAMIN) 1000 MCG tablet Take 1,000 mcg by mouth daily    Historical Provider, MD   docusate sodium (COLACE) 100 MG capsule Take 100 mg by mouth 2 times daily    Historical Provider, MD   magnesium gluconate (MAGONATE) 500 MG tablet Take 500 mg by mouth daily    Historical Provider, MD   nystatin (MYCOSTATIN) 395398 UNIT/GM cream Apply topically 2 times daily Apply topically 2 times daily. Historical Provider, MD   polyethylene glycol (GLYCOLAX) 17 GM/SCOOP powder Take 17 g by mouth 2 times daily    Historical Provider, MD   psyllium (METAMUCIL) 58.6 % packet Take 1 packet by mouth daily    Historical Provider, MD   hydrocortisone 2.5 % ointment Apply topically 2 times daily Apply topically 2 times daily. Historical Provider, MD   Multiple Vitamins-Minerals (MULTIVITAMIN ADULTS PO) Take by mouth    Historical Provider, MD   levothyroxine (SYNTHROID) 100 MCG tablet Take 100 mcg by mouth every morning (before breakfast)    Historical Provider, MD       Allergies:  Sulfa antibiotics, Cephalexin, Hydrocodone-acetaminophen, Trazodone, and Trimethoprim    Social History:      The patient currently lives at [address] [place], [place] [address]:   reports that she has never smoked. She has never used smokeless tobacco.  ETOH:   reports no history of alcohol use. E-cigarette/Vaping       Questions Responses    E-cigarette/Vaping Use     Start Date     Passive Exposure     Quit Date     Counseling Given     Comments               Family History:    Reviewed and negative in regards to presenting illness/complaint. Problem Relation Age of Onset    Arthritis Mother     Hearing Loss Father     Arthritis Sister        REVIEW OF SYSTEMS COMPLETED:   Pertinent positives as noted in the HPI. All other systems reviewed and negative.     PHYSICAL EXAM PERFORMED:    BP (!) 165/97   Pulse 81   Temp 98.3 °F (36.8 °C) (Oral)   Resp 22   Wt 100 lb (45.4 kg)   SpO2 96%   BMI 16.64 kg/m²     General appearance:  No apparent distress, appears stated age and frail, hard of hearing  HEENT:  Normal cephalic, atraumatic without obvious deformity. Pupils equal, round, and reactive to light. Extra ocular muscles intact. Conjunctivae/corneas clear. Neck: Supple, with full range of motion. No jugular venous distention. Trachea midline. Respiratory:  Normal respiratory effort. Clear to auscultation, bilaterally without Rales/Wheezes/Rhonchi. Cardiovascular:  Regular rate and rhythm with normal S1/S2 without murmurs, rubs or gallops. Abdomen: Soft, non-tender, non-distended with normal bowel sounds. Musculoskeletal:  No clubbing, cyanosis or edema bilaterally. Full range of motion without deformity. Skin: Skin color, texture, turgor normal.  No rashes or lesions. Neurologic:  Neurovascularly intact without any focal motor deficits other than slurred speech and mild left-sided facial flattening. She has dentures , not very cooperative with detailed examination, severe hard of hearing   psychiatric: Awake and forgetful and confused  Capillary Refill: Brisk,3 seconds, normal  Peripheral Pulses: +2 palpable, equal bilaterally       Labs:     Recent Labs     01/30/23  1306   WBC 3.9*   HGB 11.3*   HCT 34.2*        Recent Labs     01/30/23  1306   *   K 4.4      CO2 22   BUN 28*   CREATININE 0.7   CALCIUM 9.1     Recent Labs     01/30/23  1306   AST 12*   ALT 9*   BILITOT <0.2   ALKPHOS 90     No results for input(s): INR in the last 72 hours. No results for input(s): Ml Bellamy in the last 72 hours.     Urinalysis:      Lab Results   Component Value Date/Time    NITRU POSITIVE 01/30/2023 01:08 PM    WBCUA 3-5 01/30/2023 01:08 PM    BACTERIA 4+ 01/30/2023 01:08 PM    RBCUA 5-10 01/30/2023 01:08 PM    BLOODU TRACE-INTACT 01/30/2023 01:08 PM    SPECGRAV 1.025 01/30/2023 01:08 PM    GLUCOSEU Negative 01/30/2023 01:08 PM       Radiology:     CXR: I have reviewed the CXR with the following interpretation:   EKG:  I have reviewed the EKG with the following interpretation: Normal sinus rhythm 76/min    CTA HEAD NECK W CONTRAST   Final Result   1. No acute intracranial abnormality. 2. Moderate stenosis in the M3 segment of right MCA. 3. Mild to moderate stenosis in the A3 segment of the right KATHY. 4. 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 5. Severe stenosis in the P3 segment of the left PCA. Mild stenosis in the   P2 segments of the bilateral PCAs. 6. Mild stenosis in the proximal bilateral internal carotid arteries. CT HEAD WO CONTRAST   Final Result   1. No acute intracranial abnormality. 2. Moderate stenosis in the M3 segment of right MCA. 3. Mild to moderate stenosis in the A3 segment of the right KATHY. 4. 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 5. Severe stenosis in the P3 segment of the left PCA. Mild stenosis in the   P2 segments of the bilateral PCAs. 6. Mild stenosis in the proximal bilateral internal carotid arteries. XR CHEST PORTABLE   Final Result   1. No acute cardiopulmonary findings. 2.  Moderate hiatal hernia. Consults:    None    ASSESSMENT:    Active Hospital Problems    Diagnosis Date Noted    Slurred speech [R47.81] 01/30/2023     Priority: Medium         PLAN:  Slurred speech and left-sided facial flattening-time off onset not clear-CVA versus TIA-admit, neurochecks, telemetry, OT PT speech evaluation  Discussed with son/POA about goals of care  He is DO NOT RESUSCITATE and does not want any aggressive management at this time  He prefers to keep her comfortable and would like to get palliative care or hospice involvement. They prefer to involve hospice at [facility].   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  Possible CVA, UTI, new place new people on top of underlying dementia    Hypothyroidism-Synthroid    Hard of hearing-uses hearing aid       DVT Prophylaxis: Lovenox  Diet: Regular after assessing the safety of swallowing  Code Status: Limited no cardiac compression, intubation, shock or pressors r    PT/OT Eval Status: Ordered    Dispo -admitted to [address] [place] [address] eval pending    Addendum  Procalcitonin is within normal limits-no antibiotics started follow-up urine culture   Aaron Moyer MD    Thank you Rebecca Borrero MD for the opportunity to be involved in this patient's care. If you have any questions or concerns please feel free to contact me at [phone].

## 2023-01-30 NOTE — ED NOTES
Patient son at bedside. Son states he went to check on patient this morning and she was difficult to arouse. Once patient finally woke up, the patient's speech was \"not the same as usual\". Pt son states she can typically carry on a conversation with no difficulty but was unable to do so. Pt son last saw patient yesterday morning and she was at her normal baseline.         Christofer Valenzuela RN  01/30/23 6417

## 2023-01-30 NOTE — ED NOTES
Home medication list updated with list provided by patient son. Patient unable to confirm any of the meds at this time however son confirms list is up to date.           Mona Leo RN  01/30/23 8125

## 2023-01-30 NOTE — PROGRESS NOTES
Assessment complete and documented. BP elevated. All other vital signs stable. Pt denies pain at this time. A&O to self only. Safety precautions in place. Bed locked, alarmed, and in lowest position. Call light and bedside table within reach. Will continue to monitor.

## 2023-01-30 NOTE — ACP (ADVANCE CARE PLANNING)
Advance Care Planning     General Advance Care Planning (ACP) Conversation    Date of Conversation: 1/30/2023  Conducted with: Patient with Decision Making Capacity    Healthcare Decision Maker:    Primary Decision Maker: Genevieve Meza - [facility] - [phone]  Click here to complete [address] including selection of the Healthcare Decision Maker Relationship (ie \"Primary\"). Today we documented Decision Maker(s) consistent with Legal Next of Kin hierarchy.     Content/Action Overview:  Has NO ACP documents/care preferences - information provided, considering goals and options          Length of Voluntary ACP Conversation in minutes:  <16 minutes (Non-Billable)    JUANA Nuñez

## 2023-01-31 LAB
CHOLESTEROL, TOTAL: 184 MG/DL (ref 0–199)
ESTIMATED AVERAGE GLUCOSE: 105.4 MG/DL
HBA1C MFR BLD: 5.3 %
HCT VFR BLD CALC: 36.1 % (ref 36–48)
HDLC SERPL-MCNC: 77 MG/DL (ref 40–60)
HEMOGLOBIN: 11.9 G/DL (ref 12–16)
LDL CHOLESTEROL CALCULATED: 86 MG/DL
MCH RBC QN AUTO: 31.1 PG (ref 26–34)
MCHC RBC AUTO-ENTMCNC: 33.1 G/DL (ref 31–36)
MCV RBC AUTO: 94.1 FL (ref 80–100)
PDW BLD-RTO: 15.2 % (ref 12.4–15.4)
PLATELET # BLD: 237 K/UL (ref 135–450)
PMV BLD AUTO: 7.8 FL (ref 5–10.5)
RBC # BLD: 3.84 M/UL (ref 4–5.2)
TRIGL SERPL-MCNC: 103 MG/DL (ref 0–150)
VLDLC SERPL CALC-MCNC: 21 MG/DL
WBC # BLD: 3.7 K/UL (ref 4–11)

## 2023-01-31 PROCEDURE — 97530 THERAPEUTIC ACTIVITIES: CPT

## 2023-01-31 PROCEDURE — 6370000000 HC RX 637 (ALT 250 FOR IP): Performed by: INTERNAL MEDICINE

## 2023-01-31 PROCEDURE — 80061 LIPID PANEL: CPT

## 2023-01-31 PROCEDURE — 36415 COLL VENOUS BLD VENIPUNCTURE: CPT

## 2023-01-31 PROCEDURE — 92610 EVALUATE SWALLOWING FUNCTION: CPT

## 2023-01-31 PROCEDURE — 6370000000 HC RX 637 (ALT 250 FOR IP): Performed by: NURSE PRACTITIONER

## 2023-01-31 PROCEDURE — 97535 SELF CARE MNGMENT TRAINING: CPT

## 2023-01-31 PROCEDURE — 85027 COMPLETE CBC AUTOMATED: CPT

## 2023-01-31 PROCEDURE — 97165 OT EVAL LOW COMPLEX 30 MIN: CPT

## 2023-01-31 PROCEDURE — 97162 PT EVAL MOD COMPLEX 30 MIN: CPT

## 2023-01-31 PROCEDURE — 83036 HEMOGLOBIN GLYCOSYLATED A1C: CPT

## 2023-01-31 PROCEDURE — 97110 THERAPEUTIC EXERCISES: CPT

## 2023-01-31 PROCEDURE — 6360000002 HC RX W HCPCS: Performed by: INTERNAL MEDICINE

## 2023-01-31 PROCEDURE — 1200000000 HC SEMI PRIVATE

## 2023-01-31 RX ORDER — LEVOFLOXACIN 500 MG/1
250 TABLET, FILM COATED ORAL DAILY
Status: DISCONTINUED | OUTPATIENT
Start: 2023-01-31 | End: 2023-02-01 | Stop reason: HOSPADM

## 2023-01-31 RX ADMIN — HEPARIN SODIUM 5000 UNITS: 5000 INJECTION INTRAVENOUS; SUBCUTANEOUS at 20:50

## 2023-01-31 RX ADMIN — LEVOTHYROXINE SODIUM 100 MCG: 0.1 TABLET ORAL at 08:06

## 2023-01-31 RX ADMIN — DOCUSATE SODIUM 100 MG: 100 CAPSULE, LIQUID FILLED ORAL at 08:06

## 2023-01-31 RX ADMIN — MAGNESIUM OXIDE TAB 400 MG (240 MG ELEMENTAL MG) 500 MG: 400 (240 MG) TAB at 08:05

## 2023-01-31 RX ADMIN — PANTOPRAZOLE SODIUM 40 MG: 40 TABLET, DELAYED RELEASE ORAL at 08:06

## 2023-01-31 RX ADMIN — HEPARIN SODIUM 5000 UNITS: 5000 INJECTION INTRAVENOUS; SUBCUTANEOUS at 08:06

## 2023-01-31 RX ADMIN — LEVOFLOXACIN 250 MG: 500 TABLET, FILM COATED ORAL at 16:47

## 2023-01-31 RX ADMIN — PANTOPRAZOLE SODIUM 40 MG: 40 TABLET, DELAYED RELEASE ORAL at 16:47

## 2023-01-31 RX ADMIN — ATORVASTATIN CALCIUM 10 MG: 10 TABLET, FILM COATED ORAL at 20:06

## 2023-01-31 RX ADMIN — DOCUSATE SODIUM 100 MG: 100 CAPSULE, LIQUID FILLED ORAL at 20:06

## 2023-01-31 RX ADMIN — ASPIRIN 81 MG: 81 TABLET, COATED ORAL at 08:06

## 2023-01-31 ASSESSMENT — ENCOUNTER SYMPTOMS
NAUSEA: 0
CONSTIPATION: 0
VOMITING: 0
SORE THROAT: 1
TROUBLE SWALLOWING: 0
RESPIRATORY NEGATIVE: 1
DIARRHEA: 0

## 2023-01-31 ASSESSMENT — PAIN SCALES - GENERAL: PAINLEVEL_OUTOF10: 0

## 2023-01-31 NOTE — CONSULTS
PALLIATIVE MEDICINE CONSULTATION     Patient name:Arabella Palm   ORD:[id number]    :1920  Room/Bed:0357/0357-01   LOS: 1 day         Date of consult:2023    Consult Information  Palliative Medicine Consult performed by: LUCIANA Cedillo CNP      Inpatient consult to Palliative Care  Consult performed by: LUCIANA Stein CNP  Consult ordered by: LUCIANA Capps CNP  Reason for consult: goals of care       Number of admissions past 12 months: 2      ASSESSMENT/RECOMMENDATIONS     80 y.o. female from independent living with TIA      Symptom Management:  Goals of Care - Family wants to be conservative with treatment given patient's age; would like SNF with goal to regain as much of her independence/function as possible and maybe be able to return to her apartment. Would never want any aggressive procedures or treatment. Code status is Limited x 4 Nos. Altered mental status - multifactorial - TIA, possible UTI, underlying dementia. Family does not want additional imaging at this time. Patient is alert and oriented for me today but extremely hard of hearing. Fatigue - likely due to advanced age, deconditioning, TIA and possible UTI. Sleeping this morning, says she is tired. Lives alone in independent living but has visiting esther checking on her frequently. Son feels like she has become more worn down and easily fatigued over the past few months. Patient reports enjoying PT today and wants to continue to do PT at [facility] after discharge. Patient/Family Goals of Care :    23    Spoke with patient at the bedside. She is extremely hard of hearing. She knows she is in the hospital.  She denies any needs. She likes doing PT and would like to continue doing it. She has one child, [facility], and I can call him with any questions. Called [facility]. He said patient was in independent living until yesterday.   He did have visiting maria isabel checking on her throughout the day most days of the week. Patient had a similar event with a TIA last year. She went to SNF and was able to bounce back. He is really hopeful she can bounce back again this time. Initially he asked about the MRI results . Explained MRI was not ordered. Discussed the [address], he agrees that there is no point in additional imaging and wouldn't want her to have to lie still for the test.  He wants to treat mom for what can be treated, but he does not want to be aggressive given her age. He wants her to do PT. He has the list of SNFs to choose from and he will look at them today. He does not want hospice. He says he is not at the point of hospice yet. We did discuss that patient may not be able to return to independent living as she was previously and might require nursing home care or near 24/7 in-home care. He is hopeful this will not be the case but understands. She hasn't had a great appetite, but he doesn't want to force her to eat. She has had 1 fall in the past month. Usually walks with a walker. Verified code status is a DNR/DNI.  (Limited x 4 Nos)    Disposition/Discharge Plan:   - SNF when clear for discharge       Advance Directives:  Surrogate Decision Maker: Son, Zulay Trinidad  Code status:  Limited x 4 Nos      Palliative Performance Scale:     [] 60%  Amb reduced; Sig dz. Can't do hobbies/housework; Intake normal or reduced, Occasional assist; LOC full/confusion   [] 50%  Mainly sit/lie; Extensive disease. Mainly assist, Intake normal or reduced; Occasional assist; LOC full/confusion   [] 40%  Mainly in bed; Extensive disease; Mainly assist; Intake normal or reduced; Occasional assist; LOC full/confusion   [x] 30%  Bed bound, Extensive disease; Total care; Intake reduced; LOC full/confusion   [] 20%  Bed bound; Extensive disease; Total care; Intake minimal; Drowsy/coma   [] 10%  Bed bound; Extensive disease;  Total care; Mouth care only; Drowsy/coma   []  0%   Death      Case discussed with: patient, family, floor RN, , attending NP  Thank you for allowing us to participate in the care of this patient. HISTORY     CC: fatigue  HPI: The patient is a 80 y.o. female who presented to [facility] with  above c/o . History is from son. She lives in [facility] and has mild memory impairment. She is very hard of hearing and very difficult to communicate at this time. Her speech is slowed. Son was with her last night and she was at her baseline. Staff found her around noon having slurred speech. Speech is not clear at this time. She denies headache dizziness chest pain cough sputum or any focal neurological weakness. She had a spell of dizziness and slurred speech a week ago and spontaneously resolved    Palliative Medicine SymptomScreening/ROS:    Review of Systems   Constitutional:  Positive for appetite change and fatigue. HENT:  Positive for hearing loss and sore throat. Negative for trouble swallowing. Respiratory: Negative. Cardiovascular: Negative. Gastrointestinal:  Negative for constipation, diarrhea, nausea and vomiting. Genitourinary: Negative. Musculoskeletal: Negative. Neurological:  Positive for dizziness. Psychiatric/Behavioral: Negative. All other systems reviewed and are negative. A complete 10 count ROS was obtained. Pertinent positives mentioned above in HPI/ROS. All others if not mentioned are negative. Home med list and hospital medications reviewed in chart as of 1/31/2023     EXAM     Vitals:    01/31/23 0745   BP: (!) 144/78   Pulse: 75   Resp: 16   Temp: 97 °F (36.1 °C)   SpO2: 95%       Physical Examination:   Physical Exam  Constitutional:       General: She is not in acute distress. Appearance: Normal appearance. She is not ill-appearing. Comments: Sleeping in bed. No family at bedside   HENT:      Head: Normocephalic and atraumatic.       Nose: Nose normal.      Mouth/Throat:      Mouth: Mucous membranes are dry. Eyes:      General: No scleral icterus. Right eye: No discharge. Left eye: No discharge. Extraocular Movements: Extraocular movements intact. Conjunctiva/sclera: Conjunctivae normal.   Cardiovascular:      Rate and Rhythm: Normal rate. Pulses: Normal pulses. Pulmonary:      Effort: Pulmonary effort is normal.      Breath sounds: Normal breath sounds. Comments: Room air  Abdominal:      General: There is no distension. Palpations: Abdomen is soft. Musculoskeletal:         General: Normal range of motion. Cervical back: Normal range of motion and neck supple. Skin:     General: Skin is warm and dry. Capillary Refill: Capillary refill takes less than 2 seconds. Coloration: Skin is pale. Neurological:      Mental Status: She is alert and oriented to person, place, and time. Psychiatric:         Mood and Affect: Mood normal.         Behavior: Behavior normal.         Thought Content:  Thought content normal.         Judgment: Judgment normal.           Current labs in the epic chart reviewed as of 1/31/2023   Review of previous notes, admits, labs, radiology and testing relevant to this consult done in this chart today 1/31/2023      Total time: 80 minutes  >50% of time spent counseling patient at bedside or POA/family member if applicable , reviewing information and discussing care, coordinating with care team  Signed By: Electronically signed by LUCIANA Vidal CNP on 1/31/2023 at 11:25 AM  Palliative Medicine   [phone]    January 31, 2023

## 2023-01-31 NOTE — CARE COORDINATION
Chart reviewed. Slurred speech, confusion. Management per IM. Pt from [address] [place] [address] Rd. PTOT rec SNF. Initially, pt's son reluctant to consider SNF. Palliative care nurse spoke with pt and son today. Pt/son not ready for hospice. Have reconsidered and now are agreeable to SNF. Preferences are 1. [facility] and 2. [facility]. CM called and faxed referral to [facility]. Awaiting return call. Pt will require precert. CM will continue to follow.  Amisha Thompson RN

## 2023-01-31 NOTE — PROGRESS NOTES
Assessment complete and documented. BP elevated. All other vital signs stable. Pt A&O to self and time only. NIHSS 1. Pt denies pain at this time. Bed locked, alarmed, and in lowest position. Call light and bedside table within reach. Will continue to monitor.

## 2023-01-31 NOTE — CONSULTS
Consult placed    Who:Connie KATZ  Date:1/31/2023,  Time:10:52 AM        Electronically signed by Mdady Laughlin RN on 1/31/2023 at 10:52 AM

## 2023-01-31 NOTE — PROGRESS NOTES
Speech Language Pathology  Clinical Bedside Swallow Assessment  Facility/Department: [facility] B3 - MED SURG        Recommendations:  Diet recommendation: IDDSI 7 Regular- Easy To Chew; IDDSI 0 Thin Liquids; Meds PO as tolerated  Instrumentation: not clinically indicated  Risk management: upright for all intake, stay upright for at least 30 mins after intake, small bites/sips, oral care 2-3x/day to reduce adverse affects in the event of aspiration, alternate bites/sips, slow rate of intake, general GERD precautions, general aspiration precautions, and hold PO and contact SLP if s/s of aspiration or worsening respiratory status develop. 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    Hiatal hernia 2017     Past Medical History:   Diagnosis Date    Thyroid disease     TIA (transient ischemic attack)      Past Surgical History:   Procedure Laterality Date    CATARACT REMOVAL      OU    HYSTERECTOMY (CERVIX STATUS UNKNOWN)       Allergies   Allergen Reactions    Sulfa Antibiotics Rash    Cephalexin     Hydrocodone-Acetaminophen     Trazodone     Trimethoprim      SMZ- TMP DS  (Bactrim DS, Sulfatrim, Bactrim)       DATE ONSET:  23    Date of Evaluation: 2023   Evaluating Therapist: RENAE Rodas    Chart Reviewed: : [x] Yes [] No    Current Diet: ADULT DIET;  Regular    Recent Chest Radiography: [x] Chest XR   [] CT of Chest  Date: 1/31/23  Impressions     Impression   1. No acute cardiopulmonary findings. 2.  Moderate hiatal hernia. Pain: none reported    Reason for Referral  Alpesh Drummond was referred for a bedside swallow evaluation to assess the efficiency of their swallow function, identify signs and symptoms of aspiration and make recommendations regarding safe dietary consistencies, effective compensatory strategies, and safe eating environment. Assessment    Medical record review/interview: Per MD H&P: \"\"80 y.o. female who presented to [facility] with  above c/o . History is from son. She lives in [facility] and has mild memory impairment. She is very hard of hearing and very difficult to communicate at this time. Her speech is slowed. Son was with her last night and she was at her baseline. Staff found her around noon having slurred speech. Speech is not clear at this time. She denies headache dizziness chest pain cough sputum or any focal neurological weakness.   She had a spell of dizziness and slurred speech a week ago and spontaneously resolved\"\"        Predisposing dysphagia risk factors: Hx of neurological disease  and Age  Clinical signs of possible chronic dysphagia: N/A  Precipitating dysphagia risk factors: N/A    Patient Complaints:  Odynophagia: [] Yes [x] No  Globus Sensation: [] Yes [x] No  SOB with PO intake: [] Yes  [x]No  Increased WOB with PO intake: [] Yes [x] No  Reflux Sx's: [] Yes [x] No  Weight loss: [] Yes [x] No  Coughing/Choking with PO intake: [] Yes [x] No  Reduced Appetite: [] Yes [x] No    Additional Reported Symptoms/Complaints/Hospital Course:     Vitals/labs:   Temp: N/A  SpO2: 95%  RR: 16  BP: 144/78  HR: 75  O2 device: RA    CBC:   Recent Labs     01/31/23  0559   WBC 3.7*   HGB 11.9*         BMP:  Recent Labs     01/30/23  1306   *   K 4.4      CO2 22   BUN 28*   CREATININE 0.7   GLUCOSE 98          Cranial nerve exam:   CN V (trigeminal): ophthalmic, maxillary, and mandibular facial sensation- Reduced  CN VII (facial): Reduced  CN IX/X (glossopharyngeal/vagus): MPT: DNT; pitch range: DNT; vocal quality: WFL; cough: Weak- perceptually  CN XII (hypoglossal): Reduced    Laryngeal function exam:   Secretions: N/A  Vocal quality: See CN exam above  MPT: See CN exam above  S/Z ratio: DNT  Pitch range: See CN exam above  Cough: See CN exam above    Oral Care Status:    [] Oral Care WFL  [] Poor oral care status  [] Edentulous  [x] Upper Dentures  [x] Lower Dentures  [] Missing/Broken Teeth  [] Evidence of dental cavities/carries    PO trials:   IDDSI 0 (thin): via straw x6; no overt s/s dysphagia    IDDSI 5 (minced and moist): bite x1; no overt s/s dysphagia    IDDSI 6 (soft and bite sized): bites x2; no overt s/s dysphagia    IDDSI 7 (regular): pt attempted to bite into efraín cracker, stated it's too hard    3 oz water: PASS    Impressions:  RN okayed SLP entry. Pt oriented x3 (stated 2022 for year). Noted pt with slurred/slowed speech upon admission; during session, pt's speech and language appeared WFL. Pt very [place]. Pt had just finished breakfast upon entry, agreeable to few bites of food. Pt w/ no overt s/s dysphagia w/ thin, minced and moist and soft solids. Pt attempted to bite efraín but stated it's too hard.  Recommend easy to chew diet with thin liquids and meds PO as tolerated. ST to follow.    Recommendations:  Diet recommendation: IDDSI 7 Regular- Easy To Chew; IDDSI 0 Thin Liquids; Meds PO as tolerated  Instrumentation: not clinically indicated  Risk management: upright for all intake, stay upright for at least 30 mins after intake, small bites/sips, oral care 2-3x/day to reduce adverse affects in the event of aspiration, alternate bites/sips, slow rate of intake, general GERD precautions, general aspiration precautions, and hold PO and contact SLP if s/s of aspiration or worsening respiratory  status develop. Prognosis: Good    Recommended Intervention:   [] Dysphagia tx  [] Videostroboscopy                      [] NPO   [] MBS       [] Speech/Cog Eval    [] Therapeutic PO Trials     [] Ice Chips   [] Other:  [] FEES                                                 Dysphagia Therapeutic Intervention:   []  Bolus control Exercises  []  Oral Motor Exercises  []  Whitaker Water Protocol  []  Thermal Stimulation  []  Oral Care    []  Vital Stim/NMES  []  Laryngeal Exercises  []  Patient/Family Education  []  Pharyngeal Exercises  []  Therapeutic PO trials with SLP  [x]  Diet tolerance monitoring  []  Other:     Referrals: N/A  [] ENT    [] PT  [] Pulmonology [] GI  [] Neurology  [] RD  [] OT   []     Goals:  Short Term Goals:  Timeframe for Short Term Goals: (5 days 2/05/23)  Goal 1: The patient will tolerate recommended diet with no clinical s/s of aspiration 5/5  Goal 2: The patient/caregiver will demonstrate understanding of compensatory swallow strategies, for improved swallow safety    Long Term Goals:   Timeframe for Long Term Goals: (7 days 2/07/23)  Goal 1: The patient will tolerate least restrictive diet with no clinical s/s of aspiration or worsening respiratory/pulmonary status    Treatment:  Skilled instruction completed with patient re: evidenced based practice regarding recommendations and POC, importance of oral care to reduce adverse affects in the event of aspiration, and instruction of recommended compensatory strategies developed based upon clinical exam. Pt able to recall/demonstrate compensatory strategies with min cues.       Pt Education: SLP educated the patient re: Role of SLP, rationale for completion of assessment, results of assessment, and recommendations  Pt Education Response: verbalized understanding    Duration/Frequency of Tx: 2-3x/week    Individuals Consulted:   [x]  Patient     []  NP         []  RN   []  RD                   []  MD      []  Family Member []  PA    []  Other:      Safety Devices / Report:  [x]  All fall risk precautions in place []  Safety handoff completed with RN  []  Bed alarm in place  []  Left in bed     [x]  Chair alarm in place  [x]  Left in chair   [x]  Call light in reach   []  Other: Total Treatment Time / Charges       Time in Time out Total Time / units   Swallow Eval/Tx Time  0588 0932 9 min / 1 unit     Signature:  ELENA Esteves  Speech Language Pathologist

## 2023-01-31 NOTE — PROGRESS NOTES
Physician Progress Note      Pepper Toth  [facility] #:                  [id number]  :                       1920  ADMIT DATE:       2023 12:44 PM  DISCH DATE:  RESPONDING  PROVIDER #:        STEVEN BRAXTON          QUERY TEXT:    Pt admitted with slurred speech and has encephalopathy documented. If   possible, please document in progress notes and discharge summary further   specificity regarding the type of encephalopathy:    The medical record reflects the following:  Risk Factors: unable to r/o UTI  Clinical Indicators: H&P-\"Acute on chronic   ndyvnxxxmepthy-umqpbvfdhnpwjx-Quggxamr CVA, UTI, new place new people on top   of underlying dementia/nitrite positive urine  UTI cannot be ruled out-urine culture  Treatment: urine culture, Rocephin IV, -admit, neurochecks, telemetry, OT PT   speech evaluation, palliative care, supportive care    Thank-You, Brittany De Los Santos RN, BSN, CCDS  Options provided:  -- Multifactorial acute encephalopathy (with underlying dementia) including   Metabolic encephalopathy due to UTI  -- Other - I will add my own diagnosis  -- Disagree - Not applicable / Not valid  -- Disagree - Clinically unable to determine / Unknown  -- Refer to Clinical Documentation Reviewer    PROVIDER RESPONSE TEXT:    [Multifactorial acute encephalopathy (with underlying dementia) including   Metabolic encephalopathy due to UTI.     Query created by: Malcolm Plunkett on 2023 11:45 AM      Electronically signed by:  Genie BRAXTON 2023 1:17 PM

## 2023-01-31 NOTE — PROGRESS NOTES
Comprehensive Nutrition Assessment    Type and Reason for Visit:  Initial, Positive Nutrition Screen (MST=2: weight loss, decreased appetite)    Nutrition Recommendations/Plan:   Continue easy to chew diet   Add Ensure and Magic Cups BID   Encourage PO intakes as tolerated   Monitor nutrition adequacy, pertinent labs, bowel habits, wt changes, and clinical progress     Malnutrition Assessment:  Malnutrition Status:  Insufficient data (01/31/23 2569)      Nutrition Assessment:    81 yo female admitted with slurred speech. SLP consulted, recommend easy to chew diet. Pt sleeping at time of visit, no family present. Current weight stated and hx of stated weights per EMR, GRAY weight loss. PO intakes of 26-75% per EMR. Will add ONS and monitor acceptance. Will monitor. Nutrition Related Findings:    Distended abdomen. Na 135. Wound Type: None       Current Nutrition Intake & Therapies:    Average Meal Intake: 26-50%, 51-75%  Average Supplements Intake: None Ordered  ADULT DIET; Easy to Chew    Anthropometric Measures:  Height: 5' 5\" (165.1 cm)  Ideal Body Weight (IBW): 125 lbs (57 kg)       Current Body Weight: 100 lb (45.4 kg), 80 % IBW.  Weight Source: Stated  Current BMI (kg/m2): 16.6                          BMI Categories: Underweight (BMI less than 22) age over 72    Estimated Daily Nutrient Needs:  Energy Requirements Based On: Kcal/kg (25-30)  Weight Used for Energy Requirements: Ideal  Energy (kcal/day): 7188-9074 kcal  Weight Used for Protein Requirements: Ideal (1.0-1.2 g/kg)  Protein (g/day): 57-69 g  Method Used for Fluid Requirements: 1 ml/kcal  Fluid (ml/day): 4727-7092 mL    Nutrition Diagnosis:   Inadequate oral intake related to inadequate protein-energy intake as evidenced by poor intake prior to admission, weight loss    Nutrition Interventions:   Food and/or Nutrient Delivery: Continue Current Diet, Start Oral Nutrition Supplement  Nutrition Education/Counseling: No recommendation at this time  Coordination of Nutrition Care: Continue to monitor while inpatient       Goals:     Goals: PO intake 50% or greater, prior to discharge       Nutrition Monitoring and Evaluation:   Behavioral-Environmental Outcomes: None Identified  Food/Nutrient Intake Outcomes: Food and Nutrient Intake, Supplement Intake  Physical Signs/Symptoms Outcomes: Biochemical Data, Nutrition Focused Physical Findings, Weight    Discharge Planning:    Continue current diet, Continue Oral Nutrition Supplement     Renee Dye MS, RD, LD  Contact: [phone]

## 2023-01-31 NOTE — PROGRESS NOTES
Physical Therapy  Facility[facility]Department: [facility] - MED SURG  Physical Therapy Initial Assessment/ Treatment    Name: Hair Aldridge  : 1920  MRN: [id number]  Date of Service: 2023    Discharge Recommendations:  Subacute/Skilled Nursing Facility   PT Equipment Recommendations  Equipment Needed: No      Patient Diagnosis(es): The primary encounter diagnosis was General weakness. A diagnosis of Symptoms of cerebrovascular accident (CVA) was also pertinent to this visit. Past Medical History:  has a past medical history of Thyroid disease and TIA (transient ischemic attack). Past Surgical History:  has a past surgical history that includes Cataract removal and Hysterectomy. If pt discharges prior to next PT session this note will serve as discharge summary. Assessment   Body Structures, Functions, Activity Limitations Requiring Skilled Therapeutic Intervention: Decreased functional mobility ; Decreased strength;Decreased endurance;Decreased balance  Assessment: 79 yo female adm with slurred speech. PLOF: Lives at [facility], assist for bathing, transfers, short distance amb with RW, some memory loss at baseline. Today pt mod assist for supine to sit, mod assist to stand and take several steps bed to chair. Pt will benefit from skilled  PT to address deficits. Recommend SNF at baseline  Therapy Prognosis: Good  Decision Making: Medium Complexity  Barriers to Learning: [place]  Requires PT Follow-Up: Yes     Plan   Physcial Therapy Plan  General Plan: 3-5 times per week  Current Treatment Recommendations: Strengthening, Balance training, Endurance training, Functional mobility training, Transfer training, Gait training, Safety education & training, Therapeutic activities  Safety Devices  Type of Devices:  All fall risk precautions in place, Chair alarm in place, Call light within reach, Patient at risk for falls, Gait belt, Nurse notified (Face to face handoff to PCA) Restrictions  Restrictions/Precautions  Restrictions/Precautions: Up as Tolerated, Fall Risk, General Precautions     Subjective   Chart Reviewed: Yes  Patient assessed for rehabilitation services?: Yes  Family / Caregiver Present: No  Referring Practitioner: Abdoul Pereira  Referral Date : 01/30/23  Diagnosis: slurred speech  Follows Commands: Within Functional Limits  General Comment  Comments: RN cleared pt for therapy, pt resting in bed on approach  Subjective  Subjective: Pt agrees to therapy  PAIN: Pt denies pain at rest and with mobility         Social/Functional History  Social/Functional History  Lives With: Alone  Type of Home: Senior housing apartment ([address] [place] [address]  Home Layout: One level  ADL Assistance: Needs assistance (assist for bathing)  Ambulation Assistance: Needs assistance  Transfer Assistance: Needs assistance  Additional Comments: some memory loss at baseline  Vision/Hearing  Vision  Vision: Within Functional Limits  Hearing  Hearing: Exceptions to [facility]  Hearing Exceptions: No hearing aid    Cognition   Orientation  Orientation Level: Oriented to place;Oriented to time;Oriented to person  Cognition  Overall Cognitive Status: WFL     Objective   Heart Rate: 75  Heart Rate Source: Monitor  BP: (!) 144/78  BP Location: Left upper arm  BP Method: Automatic  Patient Position: Semi fowlers  MAP (Calculated): 100  Resp: 16  SpO2: 95 %  O2 Device: None (Room air)        Gross Assessment  AROM: Within functional limits  Strength: Generally decreased, functional         Bed mobility  Supine to Sit: Moderate assistance for trunk and LEs from partially elevated HOB with verbal and tactile cues, extra time needed, use of bed rail    Transfers  Sit to Stand:  Moderate Assistance  Stand to Sit: Moderate Assistance  Bed to Chair: Moderate assistance  Extra time needed, verbal & manual cues    Ambulation  Comments: Pt took 6-8 steps bed to chair with moderate hand held assist, moves slowly with shortened stride and minimal foot clearance with verbal and tactile cues        Exercise Treatment: Ankle pumps: 10 x B  Seated march: 8 x B  Seated clam shells: 8 x B  LAQ: 5 x B  Seated alternate UE Row: 5 x B        AM-PAC Score  AM-PAC Inpatient Mobility Raw Score : 12 (01/31/23 0901)  AM-PAC Inpatient T-Scale Score : 35.33 (01/31/23 0901)  Mobility Inpatient CMS 0-100% Score: 68.66 (01/31/23 0901)  Mobility Inpatient CMS G-Code Modifier : CL (01/31/23 0901)   Goals  Short Term Goals  Time Frame for Short Term Goals: 1 week ( 2/6) unless otherwise specified  Short Term Goal 1: pt to move supine><sit CG  Short Term Goal 2: Pt to transfer sit><stand CG  Short Term Goal 3: pt to amb with RW 25 ft min of 1  Short Term Goal 4: pt to participate in 10 repx therapeutic ex by 2/3  Patient Goals   Patient Goals : \"to get up to a chair with help\":MET     Education  Patient Education  Education Given To: Patient  Education Provided: Role of Therapy;Plan of Care; Fall Prevention Strategies;Transfer Training  Education Method: Demonstration;Verbal  Barriers to Learning: Hearing  Education Outcome: Verbalized understanding;Demonstrated understanding      Therapy Time   Individual Concurrent Group Co-treatment   Time In 0815         Time Out 1502         Minutes 35         Timed Code Treatment Minutes: [address] Cohen, PT

## 2023-01-31 NOTE — PLAN OF CARE

## 2023-01-31 NOTE — PROGRESS NOTES
Occupational Therapy  Facility/Department: [facility]  Occupational Therapy Initial Assessment    Name: Eden Gold  : 1920  MRN: [id number]  Date of Service: 2023    Discharge Recommendations:  Subacute/Skilled Nursing Facility  OT Equipment Recommendations  Equipment Needed: No  Other: Defer to next level of care       Patient Diagnosis(es): The primary encounter diagnosis was General weakness. A diagnosis of Symptoms of cerebrovascular accident (CVA) was also pertinent to this visit. Past Medical History:  has a past medical history of Thyroid disease and TIA (transient ischemic attack). Past Surgical History:  has a past surgical history that includes Cataract removal and Hysterectomy. Assessment   Performance deficits / Impairments: Decreased functional mobility ; Decreased endurance;Decreased ADL status; Decreased balance;Decreased ROM; Decreased strength;Decreased posture    Assessment: Pt is 79 yo female presenting from independent living facility with c/o slurred speech. She is IND with mobility and ADLs at baseline. Pt is functioning below baseline this date, requiring min A for LB dressing, CGA for bed mobility, transfers, toileting, and grooming tasks. Overall, pt is limited by weakness, fatigue, and activity tolerance and would benefit from continued acute OT at this time. Recommend SNF upon d/c to improve strength, activity tolerance, and increase independence prior to returning home.     Prognosis: Good  Decision Making: Low Complexity  REQUIRES OT FOLLOW-UP: Yes  Activity Tolerance  Activity Tolerance: Patient Tolerated treatment well        Plan   Occupational Therapy Plan  Times Per Week: 3-5x/wk  Current Treatment Recommendations: Strengthening, ROM, Balance training, Functional mobility training, Endurance training, Patient/Caregiver education & training, Safety education & training, Cognitive reorientation, Self-Care / ADL, Home management training Restrictions  Restrictions/Precautions  Restrictions/Precautions: General Precautions, Fall Risk, Up as Tolerated    Subjective   General  Chart Reviewed: Yes, Orders, Progress Notes, History and Physical, Imaging, Labs  Patient assessed for rehabilitation services?: Yes  Family / Caregiver Present: No  Referring Practitioner: Nallely Martinez MD  Diagnosis: slurred speech  Subjective  Subjective: Pt asleep in bed, easily roused. Agreeable to OT session. Social/Functional History  Social/Functional History  Lives With: Alone  Type of Home: Senior housing apartment ([address] [place] [address]  Home Layout: One level  ADL Assistance: Needs assistance (assist for bathing)  Ambulation Assistance: Needs assistance  Transfer Assistance: Needs assistance  Additional Comments: some memory loss at baseline       Objective   Heart Rate: 69  Heart Rate Source: Monitor  BP: 133/75  BP Location: Left upper arm  BP Method: Automatic  Patient Position: Semi fowlers  MAP (Calculated): 94  Resp: 16  SpO2: 96 %  O2 Device: None (Room air)             Safety Devices  Type of Devices: All marilee prominences offloaded; All fall risk precautions in place;Call light within reach; Chair alarm in place;Gait belt;Nurse notified; Left in chair;Patient at risk for falls  Restraints  Restraints Initially in Place: No  Bed Mobility Training  Bed Mobility Training: Yes  Supine to Sit: Contact-guard assistance (increased time, HOB elevated, use of rails)  Scooting: Stand-by assistance (to EOB)  Transfer Training  Transfer Training: Yes  Sit to Stand: Contact-guard assistance (from EOB)  Stand to Sit: Contact-guard assistance (to chair with arms, required VC for hand placement and to back up to chair prior to sitting)  Toilet Transfer: Contact-guard assistance (use of grab bar, standard toilet)     AROM: Generally decreased, functional  PROM: Generally decreased, functional  Strength: Generally decreased, functional  Coordination: Generally decreased, functional  Tone: Normal  Sensation: Intact  ADL  Grooming: Contact guard assistance  Grooming Skilled Clinical Factors: standing at sink with RW for hand hygiene  LE Dressing: Minimal assistance  LE Dressing Skilled Clinical Factors: to thread BLE into brief while seated on toilet, minimal assist to pull openings wider for feet  Toileting: Contact guard assistance  Toileting Skilled Clinical Factors: stand toilet, able to perform clothing management standing at RW, bowel and urine hygiene seated on toilet                 Cognition  Overall Cognitive Status: WFL  Orientation  Overall Orientation Status: Within Functional Limits  Orientation Level: Oriented to place;Oriented to time;Oriented to person                  Education Given To: Patient  Education Provided: Role of Therapy;Plan of Care;ADL Adaptive Strategies;Transfer Training;Energy Conservation;Orientation; Fall Prevention Strategies  Education Method: Demonstration;Verbal  Barriers to Learning: Hearing  Education Outcome: Continued education needed;Verbalized understanding  LUE AROM (degrees)  LUE AROM : WFL  Left Hand AROM (degrees)  Left Hand AROM: WFL  RUE AROM (degrees)  RUE AROM : WFL  Right Hand AROM (degrees)  Right Hand AROM: WFL                AM-PAC Score        AM-[facility] Inpatient Daily Activity Raw Score: 20 (01/31/23 1508)  AM-PAC Inpatient ADL T-Scale Score : 42.03 (01/31/23 1508)  ADL Inpatient CMS 0-100% Score: 38.32 (01/31/23 1508)  ADL Inpatient CMS G-Code Modifier : CJ (01/31/23 1508)      Goals  Short Term Goals  Time Frame for Short Term Goals: Short term goals to be met by 2/7 unless otherwise specified  Short Term Goal 1: Pt will complete sit<>stand transfers with SPV by 2/3  Short Term Goal 2: Pt will perform LB dressing with SBA and LRAD  Short Term Goal 3: Pt will tolerate 5 min stand at sink with RW and SBA to perform ADLs  Short Term Goal 4: Pt will tolerated x15 BUE AROM to increase strength and endurance for enhanced ADL independence  Patient Goals   Patient goals : \"get stronger\"       Therapy Time   Individual Concurrent Group Co-treatment   Time In 1410         Time Out 1433         Minutes 23         Timed Code Treatment Minutes: 13 Minutes (+10 min OT eval)     If pt is unable to be seen after this session, please let this note serve as discharge summary. Please see case management note for discharge disposition. Thank you.     Thom Ayers, OT

## 2023-01-31 NOTE — PROGRESS NOTES
Hospitalist Progress Note      PCP: Sivan Pelayo MD    Date of Admission: 1/30/2023    Chief Complaint: slurred speech x 1 day    Hospital Course:  80 y.o. female who presented to [facility] with  above c/o . History is from son. She lives in [facility] and has mild memory impairment. She is very hard of hearing and very difficult to communicate at this time. Her speech is slowed. Son was with her last night and she was at her baseline. Staff found her around noon having slurred speech. Speech is not clear at this time. She denies headache dizziness chest pain cough sputum or any focal neurological weakness. She had a spell of dizziness and slurred speech a week ago and spontaneously resolved    Subjective: patient reports feeling tired. Medications:  Reviewed    Infusion Medications   Scheduled Medications    docusate sodium  100 mg Oral BID    levothyroxine  100 mcg Oral QAM AC    magnesium oxide  500 mg Oral Daily    pantoprazole  40 mg Oral BID AC    heparin (porcine)  5,000 Units SubCUTAneous BID    aspirin  81 mg Oral Daily    Or    aspirin  300 mg Rectal Daily    atorvastatin  10 mg Oral Nightly     PRN Meds: ondansetron **OR** ondansetron, polyethylene glycol, perflutren lipid microspheres, hydrALAZINE      Intake/Output Summary (Last 24 hours) at 1/31/2023 0859  Last data filed at 1/30/2023 1954  Gross per 24 hour   Intake 50 ml   Output 400 ml   Net -350 ml       Physical Exam Performed:    BP (!) 144/78   Pulse 75   Temp 97 °F (36.1 °C) (Axillary)   Resp 16   Ht 5' 5\" (1.651 m)   Wt 100 lb (45.4 kg)   SpO2 95%   BMI 16.64 kg/m²     General appearance: Frail, elderly female. No apparent distress, appears stated age and cooperative. HEENT: Pupils equal, round, and reactive to light. Conjunctivae/corneas clear. Neck: Supple, with full range of motion. No jugular venous distention. Trachea midline. Respiratory:  Normal respiratory effort.  Clear to auscultation, bilaterally without Rales/Wheezes/Rhonchi. Cardiovascular: Regular rate and rhythm with normal S1/S2 without murmurs, rubs or gallops. Abdomen: Soft, non-tender, non-distended with normal bowel sounds. Musculoskeletal: No clubbing, cyanosis or edema bilaterally. Full range of motion without deformity. Skin: Skin color nl. No rashes or lesions. Neurologic:  Neurovascularly intact without any focal sensory/motor deficits. Cranial nerves: II-XII intact, grossly non-focal.  Psychiatric: Alert/ limited insight  Capillary Refill: Brisk, 3 seconds, normal   Peripheral Pulses: +2 palpable, equal bilaterally       Labs:   Recent Labs     01/30/23  1306 01/31/23  0559   WBC 3.9* 3.7*   HGB 11.3* 11.9*   HCT 34.2* 36.1    237     Recent Labs     01/30/23  1306   *   K 4.4      CO2 22   BUN 28*   CREATININE 0.7   CALCIUM 9.1     Recent Labs     01/30/23  1306   AST 12*   ALT 9*   BILITOT <0.2   ALKPHOS 90     No results for input(s): INR in the last 72 hours. Recent Labs     01/30/23  1815 01/30/23  2222   TROPONINI <0.01 <0.01       Urinalysis:      Lab Results   Component Value Date/Time    NITRU POSITIVE 01/30/2023 01:08 PM    WBCUA 3-5 01/30/2023 01:08 PM    BACTERIA 4+ 01/30/2023 01:08 PM    RBCUA 5-10 01/30/2023 01:08 PM    BLOODU TRACE-INTACT 01/30/2023 01:08 PM    SPECGRAV 1.025 01/30/2023 01:08 PM    GLUCOSEU Negative 01/30/2023 01:08 PM       Radiology:  CTA HEAD NECK W CONTRAST   Final Result   1. No acute intracranial abnormality. 2. Moderate stenosis in the M3 segment of right MCA. 3. Mild to moderate stenosis in the A3 segment of the right KATHY. 4. 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 5. Severe stenosis in the P3 segment of the left PCA. Mild stenosis in the   P2 segments of the bilateral PCAs. 6. Mild stenosis in the proximal bilateral internal carotid arteries. CT HEAD WO CONTRAST   Final Result   1.  No acute intracranial abnormality. 2. Moderate stenosis in the M3 segment of right MCA. 3. Mild to moderate stenosis in the A3 segment of the right KATHY. 4. 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 5. Severe stenosis in the P3 segment of the left PCA. Mild stenosis in the   P2 segments of the bilateral PCAs. 6. Mild stenosis in the proximal bilateral internal carotid arteries. XR CHEST PORTABLE   Final Result   1. No acute cardiopulmonary findings. 2.  Moderate hiatal hernia. IP CONSULT TO SOCIAL WORK    Assessment/Plan:    Active Hospital Problems    Diagnosis     Slurred speech [R47.81]      Priority: Medium     Slurred speech and left-sided facial flattening-time of onset not clear-CVA versus TIA-admit, neurochecks, telemetry, OT PT speech evaluation  Discussed with son/POA about goals of care  See is DO NOT RESUSCITATE and does not want any aggressive management at this time  He prefers to keep her comfortable and would like to get palliative care or hospice involvement. They prefer to involve hospice at [facility]. Social work evaluation to facilitate hospice eval  No MRI echo or neuro eval ordered     nitrite positive urine  UTI cannot be ruled out-urine culture, levaquin po     Acute encephalopathy-multifactorial  Possible CVA, UTI, hx of underlying dementia     Hypothyroidism-Synthroid     Hard of hearing-uses hearing aid    Palliative care consulted    DVT Prophylaxis: lovenox  Diet: ADULT DIET;  Regular  Code Status: Limited  PT/OT Eval Status: consulted-rec snf    Dispo - DC when SNF arranged    Appropriate for A1 Discharge Unit: No      Gutierrez Barcenas, APRN - CNP

## 2023-02-01 VITALS
WEIGHT: 100 LBS | OXYGEN SATURATION: 95 % | TEMPERATURE: 97.2 F | HEART RATE: 86 BPM | RESPIRATION RATE: 16 BRPM | HEIGHT: 65 IN | SYSTOLIC BLOOD PRESSURE: 104 MMHG | DIASTOLIC BLOOD PRESSURE: 32 MMHG | BODY MASS INDEX: 16.66 KG/M2

## 2023-02-01 LAB
ANION GAP SERPL CALCULATED.3IONS-SCNC: 11 MMOL/L (ref 3–16)
BUN BLDV-MCNC: 23 MG/DL (ref 7–20)
CALCIUM SERPL-MCNC: 9.1 MG/DL (ref 8.3–10.6)
CHLORIDE BLD-SCNC: 100 MMOL/L (ref 99–110)
CO2: 23 MMOL/L (ref 21–32)
CREAT SERPL-MCNC: 0.6 MG/DL (ref 0.6–1.2)
GFR SERPL CREATININE-BSD FRML MDRD: >60 ML/MIN/{1.73_M2}
GLUCOSE BLD-MCNC: 97 MG/DL (ref 70–99)
ORGANISM: ABNORMAL
ORGANISM: ABNORMAL
POTASSIUM REFLEX MAGNESIUM: 4.5 MMOL/L (ref 3.5–5.1)
SODIUM BLD-SCNC: 134 MMOL/L (ref 136–145)
URINE CULTURE, ROUTINE: ABNORMAL
URINE CULTURE, ROUTINE: ABNORMAL

## 2023-02-01 PROCEDURE — 6370000000 HC RX 637 (ALT 250 FOR IP): Performed by: INTERNAL MEDICINE

## 2023-02-01 PROCEDURE — 6370000000 HC RX 637 (ALT 250 FOR IP): Performed by: NURSE PRACTITIONER

## 2023-02-01 PROCEDURE — 97530 THERAPEUTIC ACTIVITIES: CPT

## 2023-02-01 PROCEDURE — 80048 BASIC METABOLIC PNL TOTAL CA: CPT

## 2023-02-01 PROCEDURE — 97110 THERAPEUTIC EXERCISES: CPT

## 2023-02-01 PROCEDURE — 36415 COLL VENOUS BLD VENIPUNCTURE: CPT

## 2023-02-01 PROCEDURE — 6360000002 HC RX W HCPCS: Performed by: INTERNAL MEDICINE

## 2023-02-01 RX ORDER — LEVOFLOXACIN 250 MG/1
250 TABLET ORAL DAILY
Qty: 3 TABLET | Refills: 0
Start: 2023-02-02 | End: 2023-02-05

## 2023-02-01 RX ORDER — ATORVASTATIN CALCIUM 10 MG/1
10 TABLET, FILM COATED ORAL NIGHTLY
Qty: 30 TABLET | Refills: 0
Start: 2023-02-01

## 2023-02-01 RX ADMIN — LEVOFLOXACIN 250 MG: 500 TABLET, FILM COATED ORAL at 09:58

## 2023-02-01 RX ADMIN — HEPARIN SODIUM 5000 UNITS: 5000 INJECTION INTRAVENOUS; SUBCUTANEOUS at 09:47

## 2023-02-01 RX ADMIN — LEVOTHYROXINE SODIUM 100 MCG: 0.1 TABLET ORAL at 06:36

## 2023-02-01 RX ADMIN — MAGNESIUM OXIDE TAB 400 MG (240 MG ELEMENTAL MG) 500 MG: 400 (240 MG) TAB at 10:01

## 2023-02-01 RX ADMIN — PANTOPRAZOLE SODIUM 40 MG: 40 TABLET, DELAYED RELEASE ORAL at 15:42

## 2023-02-01 RX ADMIN — ASPIRIN 81 MG: 81 TABLET, COATED ORAL at 09:47

## 2023-02-01 RX ADMIN — DOCUSATE SODIUM 100 MG: 100 CAPSULE, LIQUID FILLED ORAL at 09:47

## 2023-02-01 RX ADMIN — PANTOPRAZOLE SODIUM 40 MG: 40 TABLET, DELAYED RELEASE ORAL at 06:36

## 2023-02-01 NOTE — CARE COORDINATION
Cert was obtained through [facility] [facility] scheduled transport with [facility] at 1700 to take patient to [facility]. CM is aware.

## 2023-02-01 NOTE — PROGRESS NOTES
Physical Therapy  Facility/Department: [facility] - MED SURG  Daily Treatment Note  NAME: Rasheeda Amanda  : 1920  MRN: [id number]    Date of Service: 2023    Discharge Recommendations:  Subacute/Skilled Nursing Facility   PT Equipment Recommendations  Equipment Needed: No  Other: Defer    Patient Diagnosis(es): The primary encounter diagnosis was General weakness. A diagnosis of Symptoms of cerebrovascular accident (CVA) was also pertinent to this visit. Assessment   Assessment: Pt participated fair with therpay today, primarily limited by fatigue. Pt requires min to mod A and frequent verbal cues to complete supine exercises. Pt Requires mod A for bed mobility and max A for for sit to stand. Pt with strong posterior lean in standing requiring max A for balance with RW. Pt will continue to benefit from skilled PT services in order to address the above functional deficits. Continue to rec SNF at [place]. Activity Tolerance: Patient tolerated evaluation without incident;Patient limited by fatigue  Equipment Needed: No  Other: Defer     Plan    Physcial Therapy Plan  General Plan: 3-5 times per week  Current Treatment Recommendations: Strengthening;Balance training; Endurance training;Functional mobility training;Transfer training;Gait training; Safety education & training; Therapeutic activities     Restrictions  Restrictions/Precautions  Restrictions/Precautions: General Precautions, Fall Risk, Up as Tolerated     Subjective    Subjective  Subjective: Pt in bed upon arrival, pleasant and agreeable to therpay Tx. RN cleared for therapt  Pain: Sates she has some heart burn, does not give rating  Orientation  Overall Orientation Status: Within Functional Limits  Orientation Level: Oriented to place;Oriented to time;Oriented to person;Disoriented to situation (Able to state she is in a hospital but not which one.)     Objective   Vitals  Heart Rate: 92  BP: (!) 86/52  BP Location: Left Arm  MAP (Calculated): 63  SpO2: 95 %  O2 Device: None (Room air)  Comment: BP initially 86/52, increased to 96/54 after LE exericses in bed. Bed Mobility Training  Bed Mobility Training: Yes  Supine to Sit: Moderate assistance; Adaptive equipment; Additional time (HOB elevated, assist for upper body and trunk)  Sit to Supine: Minimum assistance  Scooting: Moderate assistance  Balance  Sitting: With support  Standing: Impaired (To RW, requires max A. Strong posterior lean)  Standing - Static: Poor;Constant support  Standing - Dynamic: Poor;Constant support  Transfer Training  Transfer Training: Yes  Sit to Stand: Maximum assistance;Assist X1;Additional time; Adaptive equipment (To RW)  Stand to Sit: Moderate assistance;Assist X1;Additional time; Adaptive equipment (With RW)  Gait Training  Gait Training: No (Not safe to assess due to poor standing balance)     PT Exercises  Exercise Treatment: Supine BLE exercises: AP 15x, heel slides 15x, SLR 12x, hip abduction 15x, glute sets 15x (requires frequent verbal and tactile cues throughout for techinque and min to to mod A for SLR and hip abduction). Sitting LAQ 10x     Safety Devices  Type of Devices: All marilee prominences offloaded; All fall risk precautions in place;Gait belt;Nurse notified; Patient at risk for falls; Bed alarm in place; Left in bed;Call light within reach  Restraints  Restraints Initially in Place: No     [facility] 6 Clicks Inpatient Mobility:  AM-PAC Mobility Inpatient   How much difficulty turning over in bed?: A Little  How much difficulty sitting down on / standing up from a chair with arms?: A Lot  How much difficulty moving from lying on back to sitting on side of bed?: A Lot  How much help from another person moving to and from a bed to a chair?: A Lot  How much help from another person needed to walk in hospital room?: A Lot  How much help from another person for climbing 3-5 steps with a railing?: Total  AM-PAC Inpatient Mobility Raw Score : 12  AM-PAC Inpatient T-Scale Score : 35.33  Mobility Inpatient CMS 0-100% Score: 68.66  Mobility Inpatient CMS G-Code Modifier : CL    Goals  Short Term Goals  Time Frame for Short Term Goals: 1 week ( 2/6) unless otherwise specified  Short Term Goal 1: pt to move supine><sit CG  Short Term Goal 2: Pt to transfer sit><stand CG  Short Term Goal 3: pt to amb with RW 25 ft min of 1  Short Term Goal 4: pt to participate in 10 repx therapeutic ex by 2/3 -- MET 2/1/23  Patient Goals   Patient Goals : \"to get up to a chair with help\":MET    Education  Patient Education  Education Given To: Patient  Education Provided: Role of Therapy;Plan of Care;Transfer Training;Home Exercise Program  Education Method: Demonstration;Verbal  Barriers to Learning: Hearing  Education Outcome: Verbalized understanding;Demonstrated understanding;Continued education needed    Therapy Time   Individual Concurrent Group Co-treatment   Time In 1345         Time Out 1417         Minutes 32         Timed Code Treatment Minutes: [address], PT, DPT    If pt is unable to be seen after this session, please let this note serve as discharge summary. Please see case management note for discharge disposition. Thank you.

## 2023-02-01 NOTE — CARE COORDINATION
CASE MANAGEMENT DISCHARGE SUMMARY      Discharge to: [address] completed: yes  Hospital Exemption Notification (HENS) completed: yes, Document ID : [id number]    IMM given: (date) 2/1/23    New Durable Medical Equipment ordered/agency:     Transportation:       Medical Transport explained to [facility]. Pt/family voice no agency preference. Agency used: [facility]   time: 19:00   Ambulance form completed: Yes    Confirmed discharge plan with:     Patient: yes     Family:  yes/Aldo, son     Facility/Agency, name:  [facility] faxed   Phone number for report to facility: [phone]     RN, name: Rohan Patient    Note: Discharging nurse to complete [facility], reconcile [facility], and place final copy with patient's discharge packet. RN to ensure that written prescriptions for  Level II medications are sent with patient to the facility as per protocol.

## 2023-02-01 NOTE — PROGRESS NOTES
Hospitalist Progress Note      PCP: Alonzo Waddell MD    Date of Admission: 1/30/2023    Chief Complaint: slurred speech x 1 day    Hospital Course:  89 y.o. female who presented to [facility] with  above c/o .  History is from son.  She lives in [facility] and has mild memory impairment. She is very hard of hearing and very difficult to communicate at this time.  Her speech is slowed.  Son was with her last night and she was at her baseline.  Staff found her around noon having slurred speech. Speech is not clear at this time. She denies headache dizziness chest pain cough sputum or any focal neurological weakness. She had a spell of dizziness and slurred speech a week ago and spontaneously resolved    Subjective: patient reports feeling tired, no events overnight, dc planning in process      Medications:  Reviewed    Infusion Medications   Scheduled Medications    levoFLOXacin  250 mg Oral Daily    docusate sodium  100 mg Oral BID    levothyroxine  100 mcg Oral QAM AC    magnesium oxide  500 mg Oral Daily    pantoprazole  40 mg Oral BID AC    heparin (porcine)  5,000 Units SubCUTAneous BID    aspirin  81 mg Oral Daily    Or    aspirin  300 mg Rectal Daily    atorvastatin  10 mg Oral Nightly     PRN Meds: ondansetron **OR** ondansetron, polyethylene glycol, perflutren lipid microspheres, hydrALAZINE      Intake/Output Summary (Last 24 hours) at 2/1/2023 1040  Last data filed at 2/1/2023 0634  Gross per 24 hour   Intake 960 ml   Output --   Net 960 ml         Physical Exam Performed:    /67   Pulse 73   Temp 97.6 °F (36.4 °C) (Oral)   Resp 18   Ht 5' 5\" (1.651 m)   Wt 100 lb (45.4 kg)   SpO2 95%   BMI 16.64 kg/m²     General appearance: Frail, elderly female. No apparent distress, appears stated age and cooperative.  HEENT: Pupils equal, round, and reactive to light. Conjunctivae/corneas clear.  Neck: Supple, with full range of motion. No jugular venous distention. Trachea  midline. Respiratory:  Normal respiratory effort. Clear to auscultation, bilaterally without Rales/Wheezes/Rhonchi. Cardiovascular: Regular rate and rhythm with normal S1/S2 without murmurs, rubs or gallops. Abdomen: Soft, non-tender, non-distended with normal bowel sounds. Musculoskeletal: No clubbing, cyanosis or edema bilaterally. Full range of motion without deformity. Skin: Skin color nl. No rashes or lesions. Neurologic:  Neurovascularly intact without any focal sensory/motor deficits. Cranial nerves: II-XII intact, grossly non-focal.  Psychiatric: Alert/ limited insight  Capillary Refill: Brisk, 3 seconds, normal   Peripheral Pulses: +2 palpable, equal bilaterally       Labs:   Recent Labs     01/30/23  1306 01/31/23  0559   WBC 3.9* 3.7*   HGB 11.3* 11.9*   HCT 34.2* 36.1    237       Recent Labs     01/30/23  1306 02/01/23  0646   * 134*   K 4.4 4.5    100   CO2 22 23   BUN 28* 23*   CREATININE 0.7 0.6   CALCIUM 9.1 9.1       Recent Labs     01/30/23  1306   AST 12*   ALT 9*   BILITOT <0.2   ALKPHOS 90       No results for input(s): INR in the last 72 hours. Recent Labs     01/30/23  1815 01/30/23  2222   TROPONINI <0.01 <0.01         Urinalysis:      Lab Results   Component Value Date/Time    NITRU POSITIVE 01/30/2023 01:08 PM    WBCUA 3-5 01/30/2023 01:08 PM    BACTERIA 4+ 01/30/2023 01:08 PM    RBCUA 5-10 01/30/2023 01:08 PM    BLOODU TRACE-INTACT 01/30/2023 01:08 PM    SPECGRAV 1.025 01/30/2023 01:08 PM    GLUCOSEU Negative 01/30/2023 01:08 PM       Radiology:  CTA HEAD NECK W CONTRAST   Final Result   1. No acute intracranial abnormality. 2. Moderate stenosis in the M3 segment of right MCA. 3. Mild to moderate stenosis in the A3 segment of the right KATHY. 4. 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 5. Severe stenosis in the P3 segment of the left PCA. Mild stenosis in the   P2 segments of the bilateral PCAs.    6. Mild stenosis in the proximal bilateral internal carotid arteries. CT HEAD WO CONTRAST   Final Result   1. No acute intracranial abnormality. 2. Moderate stenosis in the M3 segment of right MCA. 3. Mild to moderate stenosis in the A3 segment of the right KATHY. 4. 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 5. Severe stenosis in the P3 segment of the left PCA. Mild stenosis in the   P2 segments of the bilateral PCAs. 6. Mild stenosis in the proximal bilateral internal carotid arteries. XR CHEST PORTABLE   Final Result   1. No acute cardiopulmonary findings. 2.  Moderate hiatal hernia. IP CONSULT TO SOCIAL WORK  IP CONSULT TO PALLIATIVE CARE    Assessment/Plan:    Active Hospital Problems    Diagnosis     Slurred speech [R47.81]      Priority: Medium     Slurred speech and left-sided facial flattening-time of onset not clear-CVA versus TIA-admit, neurochecks, telemetry, OT PT speech evaluation  Discussed with son/POA about goals of care  See is DO NOT RESUSCITATE and does not want any aggressive management at this time  He prefers to keep her comfortable and would like to get palliative care or hospice involvement. They prefer to involve hospice at [facility].   Social work evaluation to facilitate hospice eval  No MRI echo or neuro eval ordered     UTI: urine culture ecoli/klebsiella, sensitivities pending, levaquin po     Acute encephalopathy-multifactorial  Possible CVA, UTI, hx of underlying dementia     Hypothyroidism-Synthroid     Hard of hearing-uses hearing aid    Palliative care consulted    DVT Prophylaxis: lovenox  Diet: ADULT DIET; Easy to Chew  ADULT ORAL NUTRITION SUPPLEMENT; Breakfast, Dinner; Standard High Calorie/High Protein Oral Supplement  ADULT ORAL NUTRITION SUPPLEMENT; Lunch, Dinner; Frozen Oral Supplement  Code Status: Limited  PT/OT Eval Status: consulted-rec snf    Dispo - DC when SNF arranged    Appropriate for A1 Discharge Unit: LUCIANA Zurita - [facility]

## 2023-02-01 NOTE — DISCHARGE INSTR - COC
Continuity of Care Form    Patient Name: Phyllis Crowell   :  1920  MRN:  [id number]    Admit date:  2023  Discharge date:  2023    Code Status Order: Limited   Advance Directives:     Admitting Physician:  No admitting provider for patient encounter.   PCP: Isabelle Lopez MD    Discharging Nurse: Cuba Gilbert 23 Unit/Room#: 8143/1857-64  Discharging Unit Phone Number: [phone]    Emergency Contact:   Extended Emergency Contact Information  Primary Emergency Contact: Kathryn Malone Phone: [phone]  Mobile Phone: [phone]  Relation: Child    Past Surgical History:  Past Surgical History:   Procedure Laterality Date    CATARACT REMOVAL      OU    HYSTERECTOMY (CERVIX STATUS UNKNOWN)         Immunization History:   Immunization History   Administered Date(s) Administered    Influenza Virus Vaccine 10/01/2016    Pneumococcal Conjugate 13-valent (Ilia Ranks) 2017    Pneumococcal Polysaccharide ([id number]) 2017    Tdap (Boostrix, Adacel) 2020       Active Problems:  Patient Active Problem List   Diagnosis Code    Thyroid disease E07.9    Urinary frequency R35.0    Macular degeneration of left eye H35.30    Hyponatremia E87.1    Diverticulitis K57.92    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    Hiatal hernia K44.9    Weakness R53.1    Slurred speech R47.81       Isolation/Infection:   Isolation            No Isolation          Patient Infection Status       None to display            Nurse Assessment:  Last Vital Signs: BP (!) 103/56   Pulse 75   Temp 97.8 °F (36.6 °C) (Axillary)   Resp 16   Ht 5' 5\" (1.651 m)   Wt 100 lb (45.4 kg)   SpO2 94%   BMI 16.64 kg/m²     Last documented pain score (0-10 scale): Pain Level: 0  Last Weight:   Wt Readings from Last 1 Encounters:   23 100 lb (45.4 kg)     Mental Status:  disoriented, oriented, and alert    IV Access:  - None    Nursing Mobility/ADLs:  Walking   Assisted  Transfer Assisted  Bathing  Assisted  Dressing  Assisted  Toileting  Assisted  Feeding  Assisted  Med Admin  Assisted  Med Delivery   whole and prefers mixed with applesauce    Wound Care Documentation and Therapy:  Wound 03/15/22 Leg Distal;Left;Lateral open red (Active)   Number of days: 322        Elimination:  Continence: Bowel: Yes  Bladder: No  Urinary Catheter: None   Colostomy/Ileostomy/Ileal Conduit: No       Date of Last BM: 2/1/2023    Intake/Output Summary (Last 24 hours) at 1/31/2023 2341  Last data filed at 1/31/2023 2315  Gross per 24 hour   Intake 960 ml   Output 400 ml   Net 560 ml     I/O last 3 completed shifts: In: 200 [P.O.:770]  Out: 800 [Urine:800]    Safety Concerns:     History of Falls (last 30 days) and At Risk for Falls    Impairments/Disabilities:      Hearing    Nutrition Therapy:  Current Nutrition Therapy:   - Oral Diet:  Dysphagia 3 advanced    Routes of Feeding: Oral  Liquids: No Restrictions  Daily Fluid Restriction: no  Last Modified Barium Swallow with Video (Video Swallowing Test): not done    Treatments at the Time of Hospital Discharge:   Respiratory Treatments: n/a  Oxygen Therapy:  is not on home oxygen therapy.   Ventilator:    - No ventilator support    Rehab Therapies: Physical Therapy and Occupational Therapy  Weight Bearing Status/Restrictions: No weight bearing restrictions  Other Medical Equipment (for information only, NOT a DME order):  walker  Other Treatments:     Patient's personal belongings (please select all that are sent with patient):  Hearing Aides bilateral, Dentures upper and lower    RN SIGNATURE:  Electronically signed by Tim Keita RN on 2/1/23 at 4:01 PM EST    CASE MANAGEMENT/SOCIAL WORK SECTION    Inpatient Status Date: 1/30/23    Readmission Risk Assessment Score:  Readmission Risk              Risk of Unplanned Readmission:  14           Discharging to Facility/ [facility]   [address] [address] [phone]     / signature: Electronically signed by Ania Shelton RN on 2/1/23 at 3:35 PM EST    PHYSICIAN SECTION    Prognosis: Guarded    Condition at Discharge: Stable    Rehab Potential (if transferring to Rehab): Guarded    Recommended Labs or Other Treatments After Discharge: PT/OT/SN    Physician Certification: I certify the above information and transfer of Phyllis Crowell  is necessary for the continuing treatment of the diagnosis listed and that she requires [facility] for less 30 days.      Update Admission H&P: No change in H&P    PHYSICIAN SIGNATURE:  Electronically signed by LUCIANA Daniel CNP on 2/1/23 at 4:07 PM EST

## 2023-02-01 NOTE — PLAN OF CARE
TODAYS DATE:  2/1/2023    Discussed personal risk factors for Stroke /TIA with patient/family, and ways to reduce the risk for a recurrent stroke. Patient's personal risk factors which were identified are:     [] Alcohol Abuse: check with your physician before any alcohol consumption. [] Atrial fibrillation: may cause blood clots. [] Drug Abuse: Seek help, talk with your doctor  [] Clotting Disorder  [] Diabetes  [] Family history of stroke or heart disease  [] High Blood Pressure/Hypertension: work with your physician. [x] High cholesterol: monitor cholesterol levels with your physician.   [] Overweight/Obesity: work with your physician for your ideal body weight. [x] Physical Inactivity: get regular exercise as directed by your physician. [x] Personal history of previous TIA or stroke  [x] Poor Diet; decrease salt (sodium) in your diet, follow diet directed by physician. [] Smoking: Cigarette/Cigar: stop smoking. Reviewed the Following Education with Patient and/or Family:   -Signs and Symptoms of Stroke:     (facial droop, weakness/numbness especially on one side, speech difficulty, sudden confusion, sudden loss of vision, sudden severe headache,       sudden loss of balance or having difficulty walking, syncope or seizure)  -How to activate EMS (911)   -Importance of Follow Up Appointment at Discharge   -Importance of Compliance with Medications Prescribed at Discharge     Pt education needs reinforcement.      Family Present during Education: no     Stroke Education Booklet given to patient/family which includes above education: yes     Electronically signed by Demian Ruiz RN on 2/1/2023 at 4:23 AM

## 2023-02-01 NOTE — PLAN OF CARE

## 2023-02-01 NOTE — DISCHARGE SUMMARY
Hospital Medicine Discharge Summary    Patient ID: Asad Sullivan      Patient's PCP: Chaya Chen MD    Admit Date: 1/30/2023     Discharge Date:   ***    Admitting Provider: No admitting provider for patient encounter. Discharge Provider: LUCIANA Hager CNP     Discharge Diagnoses: Active Hospital Problems    Diagnosis     Slurred speech [R47.81]      Priority: Medium       The patient was seen and examined on day of discharge and this discharge summary is in conjunction with any daily progress note from day of discharge. Hospital Course: ***          Physical Exam Performed:     BP (!) 104/32   Pulse 86   Temp 97.2 °F (36.2 °C) (Oral)   Resp 16   Ht 5' 5\" (1.651 m)   Wt 100 lb (45.4 kg)   SpO2 95%   BMI 16.64 kg/m²       General appearance:  No apparent distress, appears stated age and cooperative. HEENT:  Normal cephalic, atraumatic without obvious deformity. Pupils equal, round, and reactive to light. Extra ocular muscles intact. Conjunctivae/corneas clear. Neck: Supple, with full range of motion. No jugular venous distention. Trachea midline. Respiratory:  Normal respiratory effort. Clear to auscultation, bilaterally without Rales/Wheezes/Rhonchi. Cardiovascular:  Regular rate and rhythm with normal S1/S2 without murmurs, rubs or gallops. Abdomen: Soft, non-tender, non-distended with normal bowel sounds. Musculoskeletal:  No clubbing, cyanosis or edema bilaterally. Full range of motion without deformity. Skin: Skin color, texture, turgor normal.  No rashes or lesions. Neurologic:  Neurovascularly intact without any focal sensory/motor deficits. Cranial nerves: II-XII intact, grossly non-focal.  Psychiatric:  Alert and oriented, thought content appropriate, normal insight  Capillary Refill: Brisk,< 3 seconds   Peripheral Pulses: +2 palpable, equal bilaterally       Labs:  For convenience and continuity at follow-up the following most recent labs are provided:      CBC:    Lab Results   Component Value Date/Time    WBC 3.7 01/31/2023 05:59 AM    HGB 11.9 01/31/2023 05:59 AM    HCT 36.1 01/31/2023 05:59 AM     01/31/2023 05:59 AM       Renal:    Lab Results   Component Value Date/Time     02/01/2023 06:46 AM    K 4.5 02/01/2023 06:46 AM     02/01/2023 06:46 AM    CO2 23 02/01/2023 06:46 AM    BUN 23 02/01/2023 06:46 AM    CREATININE 0.6 02/01/2023 06:46 AM    CALCIUM 9.1 02/01/2023 06:46 AM    PHOS 2.9 07/23/2017 08:28 AM         Significant Diagnostic Studies    Radiology:   CTA HEAD NECK W CONTRAST   Final Result   1. No acute intracranial abnormality. 2. Moderate stenosis in the M3 segment of right MCA. 3. Mild to moderate stenosis in the A3 segment of the right KATHY. 4. 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 5. Severe stenosis in the P3 segment of the left PCA. Mild stenosis in the   P2 segments of the bilateral PCAs. 6. Mild stenosis in the proximal bilateral internal carotid arteries. CT HEAD WO CONTRAST   Final Result   1. No acute intracranial abnormality. 2. Moderate stenosis in the M3 segment of right MCA. 3. Mild to moderate stenosis in the A3 segment of the right KATHY. 4. 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 5. Severe stenosis in the P3 segment of the left PCA. Mild stenosis in the   P2 segments of the bilateral PCAs. 6. Mild stenosis in the proximal bilateral internal carotid arteries. XR CHEST PORTABLE   Final Result   1. No acute cardiopulmonary findings. 2.  Moderate hiatal hernia.                 Consults:     IP CONSULT TO SOCIAL WORK  IP CONSULT TO PALLIATIVE CARE    Disposition:  ***     Condition at Discharge: 50Ethan Cueva Patient Condition:[id number]}    Discharge Instructions/Follow-up:  ***    Code Status:  Limited ***    Activity: activity as tolerated    Diet: {diet:11945}      Discharge Medications:     Current Discharge Medication List             Details   atorvastatin (LIPITOR) 10 MG tablet Take 1 tablet by mouth nightly  Qty: 30 tablet, Refills: 0      levoFLOXacin (LEVAQUIN) 250 MG tablet Take 1 tablet by mouth daily for 3 days  Qty: 3 tablet, Refills: 0                Details   diclofenac sodium (VOLTAREN) 1 % GEL Apply 4 g topically 4 times daily      ondansetron (ZOFRAN-ODT) 4 MG disintegrating tablet Take 1 tablet by mouth every 8 hours as needed for Nausea or Vomiting  Qty: 21 tablet, Refills: 0      pantoprazole (PROTONIX) 40 MG tablet Take 40 mg by mouth 2 times daily      triamcinolone (KENALOG) 0.1 % ointment       aspirin 81 MG EC tablet Take 81 mg by mouth daily      vitamin B-12 (CYANOCOBALAMIN) 1000 MCG tablet Take 1,000 mcg by mouth daily      docusate sodium (COLACE) 100 MG capsule Take 100 mg by mouth 2 times daily      magnesium gluconate (MAGONATE) 500 MG tablet Take 500 mg by mouth daily      nystatin (MYCOSTATIN) 849931 UNIT/GM cream Apply topically 2 times daily Apply topically 2 times daily. polyethylene glycol (GLYCOLAX) 17 GM/SCOOP powder Take 17 g by mouth 2 times daily      psyllium (METAMUCIL) 58.6 % packet Take 1 packet by mouth daily      hydrocortisone 2.5 % ointment Apply topically 2 times daily Apply topically 2 times daily. Multiple Vitamins-Minerals (MULTIVITAMIN ADULTS PO) Take by mouth      levothyroxine (SYNTHROID) 100 MCG tablet Take 100 mcg by mouth every morning (before breakfast)             Time Spent on discharge: *** in the examination, evaluation, counseling and review of medications and discharge plan. Signed:    Aj Oliveira, LUCIANA - CNP   2/1/2023      Thank you La Weiss MD for the opportunity to be involved in this patient's care. If you have any questions or concerns, please feel free to contact me at [phone].

## 2023-02-01 NOTE — CARE COORDINATION
Chart reviewed. Slurred speech, confusion. Management per IM. Pt from [address] [place] [address] Rd. PTOT rec SNF. Initially, pt's son reluctant to consider SNF. Palliative care nurse spoke with pt and son. Pt/son not ready for hospice. Have reconsidered and now are agreeable to SNF. [facility]ferences [facility]. [facility] and 2. [facility]. [facility] is able to accept. Precert started. CM will continue to follow.  Amisha Irwin RN

## 2023-02-02 NOTE — PROGRESS NOTES
Pt discharged all belongings sent including clothing, glasses, hearing aids, and dentures. Report given to transport and AVS sent. Rn called to [phone] opt 2 to give report and no answer for 4th attempt.

## 2023-03-29 ENCOUNTER — APPOINTMENT (OUTPATIENT)
Dept: GENERAL RADIOLOGY | Age: 88
DRG: 542 | End: 2023-03-29
Payer: MEDICARE

## 2023-03-29 ENCOUNTER — HOSPITAL ENCOUNTER (EMERGENCY)
Age: 88
Discharge: HOME | DRG: 542 | End: 2023-03-29
Attending: STUDENT IN AN ORGANIZED HEALTH CARE EDUCATION/TRAINING PROGRAM
Payer: MEDICARE

## 2023-03-29 ENCOUNTER — APPOINTMENT (OUTPATIENT)
Dept: CT IMAGING | Age: 88
DRG: 542 | End: 2023-03-29
Payer: MEDICARE

## 2023-03-29 VITALS
OXYGEN SATURATION: 95 % | TEMPERATURE: 97.7 F | SYSTOLIC BLOOD PRESSURE: 151 MMHG | BODY MASS INDEX: 16.64 KG/M2 | HEIGHT: 65 IN | DIASTOLIC BLOOD PRESSURE: 75 MMHG | RESPIRATION RATE: 17 BRPM | HEART RATE: 68 BPM

## 2023-03-29 DIAGNOSIS — M79.605 ACUTE LEG PAIN, LEFT: ICD-10-CM
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
## 2023-03-29 PROCEDURE — 6370000000 HC RX 637 (ALT 250 FOR IP): Performed by: STUDENT IN AN ORGANIZED HEALTH CARE EDUCATION/TRAINING PROGRAM

## 2023-03-29 PROCEDURE — 99284 EMERGENCY DEPT VISIT MOD MDM: CPT

## 2023-03-29 PROCEDURE — 73502 X-RAY EXAM HIP UNI 2-3 VIEWS: CPT

## 2023-03-29 PROCEDURE — 72125 CT NECK SPINE W/O DYE: CPT

## 2023-03-29 PROCEDURE — 70450 CT HEAD/BRAIN W/O DYE: CPT

## 2023-03-29 RX ORDER — TRAMADOL HYDROCHLORIDE 50 MG/1
25 TABLET ORAL ONCE
Status: COMPLETED | OUTPATIENT
Start: 2023-03-29 | End: 2023-03-29

## 2023-03-29 RX ORDER — ACETAMINOPHEN 500 MG
1000 TABLET ORAL
Status: COMPLETED | OUTPATIENT
Start: 2023-03-29 | End: 2023-03-29

## 2023-03-29 RX ADMIN — ACETAMINOPHEN 1000 MG: 500 TABLET ORAL at 04:48

## 2023-03-29 RX ADMIN — TRAMADOL HYDROCHLORIDE 25 MG: 50 TABLET ORAL at 05:30

## 2023-03-29 ASSESSMENT — PAIN DESCRIPTION - ORIENTATION
ORIENTATION: LEFT
ORIENTATION: LEFT

## 2023-03-29 ASSESSMENT — PAIN DESCRIPTION - PAIN TYPE: TYPE: ACUTE PAIN

## 2023-03-29 ASSESSMENT — PAIN - FUNCTIONAL ASSESSMENT
PAIN_FUNCTIONAL_ASSESSMENT: 0-10
PAIN_FUNCTIONAL_ASSESSMENT: 0-10

## 2023-03-29 ASSESSMENT — PAIN DESCRIPTION - FREQUENCY: FREQUENCY: INTERMITTENT

## 2023-03-29 ASSESSMENT — PAIN SCALES - GENERAL
PAINLEVEL_OUTOF10: 7
PAINLEVEL_OUTOF10: 2

## 2023-03-29 ASSESSMENT — PAIN DESCRIPTION - LOCATION
LOCATION: LEG
LOCATION: LEG

## 2023-03-29 ASSESSMENT — PAIN DESCRIPTION - DESCRIPTORS: DESCRIPTORS: ACHING;DULL

## 2023-03-29 NOTE — ED NOTES
Patient updated that her transportation will be here around 7am to take her home. Patient verbalized understanding. No current needs. Call light within reach.       Martha Kolb RN  03/29/23 [phone]

## 2023-03-29 NOTE — Clinical Note
Shashank Solomon was seen and treated in our emergency department on 3/29/2023. She may return to work on 03/31/2023. If you have any questions or concerns, please don't hesitate to call.       Mario Garcia, DO

## 2023-03-29 NOTE — ED NOTES
Bedside report given to [facility] transport team.  Patient discharged with all belongings and discharge paperwork. Patient verbalized understanding of discharge instructions. Patient taken by stretcher out of ED.        Ricardo Connelly RN  03/29/23 [id number]

## 2023-03-29 NOTE — ED NOTES
Patient placed on purewick. Patient denies any other needs at this time.      Liam Bah RN  03/29/23 2430

## 2023-03-29 NOTE — ED NOTES
Placed call to [facility] to see where transport is they said due to the weather they are running late they will be here by [phone]     Tori Yun  03/29/23 [id number]

## 2023-03-29 NOTE — DISCHARGE INSTRUCTIONS
Return to nearest ED if develop severe worsening pain, swelling, numbness/weakness/tingling. You can take 1000 mg of acetaminophen every 8 hours for pain. Follow-up with your PCP in 2 to 3 days if not improving.

## 2023-03-29 NOTE — ED PROVIDER NOTES
Normal inspection  RESPIRATORY: Normal breath sounds. No chest wall tenderness. No respiratory distress  CVS: Heart rate and rhythm regular. No Murmurs  ABDOMEN/GI: Soft, Non-tender, No distention  BACK: Normal inspection  EXTREMITIES: Tenderness of the left hip. Decreased ROM of the left lower extremity secondary to pain. Normal appearance. No Pedal edema  NEURO: Alert and oriented x3. Sensation normal. Motor normal  PSYCH: Mood normal. Affect normal.  SKIN: Color normal. No rash. Warm, Dry    RADIOLOGY  XR HIP 2-3 VW W PELVIS LEFT   Final Result   Diffuse osteopenia, with degenerative change in the lower lumbar spine, pubic   symphysis and hip joints, though no acute pelvic fracture or left proximal   femoral fracture is seen. CT C-Spine W/O Contrast   Final Result   1. No acute intracranial abnormality or cervical spine fracture. 2.  Generalized age-related cortical atrophy, with intracranial   atherosclerosis and CT findings suggestive of chronic microvascular ischemic   change. .   3. Multilevel degenerative changes throughout the cervical spine. CT Head W/O Contrast   Final Result   1. No acute intracranial abnormality or cervical spine fracture. 2.  Generalized age-related cortical atrophy, with intracranial   atherosclerosis and CT findings suggestive of chronic microvascular ischemic   change. .   3. Multilevel degenerative changes throughout the cervical spine. ED COURSE/MDM  Patient seen and evaluated. Old records reviewed. Labs and imaging reviewed and results discussed with patient. DIFFERENTIAL DX  Left hip fracture, strain, subdural hematoma, ICH    Presenting with left hip pain after a fall. Patient did become lightheaded. I discussed more involved work-up with the patient versus obtaining only imaging to evaluate for fractures. Patient clearly states that she is not interested in invasive treatment and wishes to be a DNR CC.   She prefers to have only imaging

## 2023-03-30 ENCOUNTER — APPOINTMENT (OUTPATIENT)
Dept: GENERAL RADIOLOGY | Age: 88
DRG: 542 | End: 2023-03-30
Payer: MEDICARE

## 2023-03-30 ENCOUNTER — APPOINTMENT (OUTPATIENT)
Dept: CT IMAGING | Age: 88
DRG: 542 | End: 2023-03-30
Payer: MEDICARE

## 2023-03-30 ENCOUNTER — APPOINTMENT (OUTPATIENT)
Dept: MRI IMAGING | Age: 88
DRG: 542 | End: 2023-03-30
Payer: MEDICARE

## 2023-03-30 ENCOUNTER — HOSPITAL ENCOUNTER (INPATIENT)
Age: 88
LOS: 5 days | Discharge: NURSING, REHAB OR LONG-TERM CARE | DRG: 542 | End: 2023-04-04
Attending: EMERGENCY MEDICINE | Admitting: INTERNAL MEDICINE
Payer: MEDICARE

## 2023-03-30 DIAGNOSIS — S72.002A CLOSED LEFT HIP FRACTURE, INITIAL ENCOUNTER (HCC): ICD-10-CM

## 2023-03-30 DIAGNOSIS — S72.102A CLOSED FRACTURE OF TROCHANTER OF LEFT FEMUR, INITIAL ENCOUNTER (HCC): ICD-10-CM
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
## 2023-03-30 LAB
ALBUMIN SERPL-MCNC: 3.6 G/DL (ref 3.4–5)
ALBUMIN/GLOB SERPL: 1.1 {RATIO} (ref 1.1–2.2)
ALP SERPL-CCNC: 119 U/L (ref 40–129)
ALT SERPL-CCNC: 9 U/L (ref 10–40)
ANION GAP SERPL CALCULATED.3IONS-SCNC: 8 MMOL/L (ref 3–16)
AST SERPL-CCNC: 14 U/L (ref 15–37)
BASOPHILS # BLD: 0 K/UL (ref 0–0.2)
BASOPHILS NFR BLD: 0.5 %
BILIRUB SERPL-MCNC: 0.6 MG/DL (ref 0–1)
BUN SERPL-MCNC: 32 MG/DL (ref 7–20)
CALCIUM SERPL-MCNC: 9.2 MG/DL (ref 8.3–10.6)
CHLORIDE SERPL-SCNC: 103 MMOL/L (ref 99–110)
CO2 SERPL-SCNC: 25 MMOL/L (ref 21–32)
CREAT SERPL-MCNC: 0.7 MG/DL (ref 0.6–1.2)
DEPRECATED RDW RBC AUTO: 16 % (ref 12.4–15.4)
EKG ATRIAL RATE: 71 BPM
EKG DIAGNOSIS: NORMAL
EKG P AXIS: 36 DEGREES
EKG P-R INTERVAL: 160 MS
EKG Q-T INTERVAL: 406 MS
EKG QRS DURATION: 88 MS
EKG QTC CALCULATION (BAZETT): 441 MS
EKG R AXIS: -40 DEGREES
EKG T AXIS: 34 DEGREES
EKG VENTRICULAR RATE: 71 BPM
EOSINOPHIL # BLD: 0.1 K/UL (ref 0–0.6)
EOSINOPHIL NFR BLD: 1.4 %
GFR SERPLBLD CREATININE-BSD FMLA CKD-EPI: >60 ML/MIN/{1.73_M2}
GLUCOSE SERPL-MCNC: 102 MG/DL (ref 70–99)
HCT VFR BLD AUTO: 36.1 % (ref 36–48)
HGB BLD-MCNC: 11.8 G/DL (ref 12–16)
LYMPHOCYTES # BLD: 0.4 K/UL (ref 1–5.1)
LYMPHOCYTES NFR BLD: 8.8 %
MCH RBC QN AUTO: 31.1 PG (ref 26–34)
MCHC RBC AUTO-ENTMCNC: 32.7 G/DL (ref 31–36)
MCV RBC AUTO: 95.3 FL (ref 80–100)
MONOCYTES # BLD: 0.3 K/UL (ref 0–1.3)
MONOCYTES NFR BLD: 7.3 %
NEUTROPHILS # BLD: 3.7 K/UL (ref 1.7–7.7)
NEUTROPHILS NFR BLD: 82 %
PLATELET # BLD AUTO: 188 K/UL (ref 135–450)
PMV BLD AUTO: 7.5 FL (ref 5–10.5)
POTASSIUM SERPL-SCNC: 4.5 MMOL/L (ref 3.5–5.1)
PROT SERPL-MCNC: 6.9 G/DL (ref 6.4–8.2)
RBC # BLD AUTO: 3.78 M/UL (ref 4–5.2)
SODIUM SERPL-SCNC: 136 MMOL/L (ref 136–145)
TROPONIN T SERPL-MCNC: 0.03 NG/ML
TROPONIN T SERPL-MCNC: 0.03 NG/ML
WBC # BLD AUTO: 4.6 K/UL (ref 4–11)

## 2023-03-30 PROCEDURE — 93005 ELECTROCARDIOGRAM TRACING: CPT | Performed by: EMERGENCY MEDICINE

## 2023-03-30 PROCEDURE — 1200000000 HC SEMI PRIVATE

## 2023-03-30 PROCEDURE — 72125 CT NECK SPINE W/O DYE: CPT

## 2023-03-30 PROCEDURE — 99285 EMERGENCY DEPT VISIT HI MDM: CPT

## 2023-03-30 PROCEDURE — 85025 COMPLETE CBC W/AUTO DIFF WBC: CPT

## 2023-03-30 PROCEDURE — 70450 CT HEAD/BRAIN W/O DYE: CPT

## 2023-03-30 PROCEDURE — 93010 ELECTROCARDIOGRAM REPORT: CPT | Performed by: INTERNAL MEDICINE

## 2023-03-30 PROCEDURE — 99221 1ST HOSP IP/OBS SF/LOW 40: CPT | Performed by: PHYSICIAN ASSISTANT

## 2023-03-30 PROCEDURE — 71045 X-RAY EXAM CHEST 1 VIEW: CPT

## 2023-03-30 PROCEDURE — 2580000003 HC RX 258: Performed by: PHYSICIAN ASSISTANT

## 2023-03-30 PROCEDURE — 80053 COMPREHEN METABOLIC PANEL: CPT

## 2023-03-30 PROCEDURE — 73721 MRI JNT OF LWR EXTRE W/O DYE: CPT

## 2023-03-30 PROCEDURE — 73700 CT LOWER EXTREMITY W/O DYE: CPT

## 2023-03-30 PROCEDURE — 84484 ASSAY OF TROPONIN QUANT: CPT

## 2023-03-30 RX ORDER — SODIUM CHLORIDE 0.9 % (FLUSH) 0.9 %
5-40 SYRINGE (ML) INJECTION PRN
Status: DISCONTINUED | OUTPATIENT
Start: 2023-03-30 | End: 2023-04-04 | Stop reason: HOSPADM

## 2023-03-30 RX ORDER — MORPHINE SULFATE 2 MG/ML
2 INJECTION, SOLUTION INTRAMUSCULAR; INTRAVENOUS ONCE
Status: DISCONTINUED | OUTPATIENT
Start: 2023-03-30 | End: 2023-04-04 | Stop reason: HOSPADM

## 2023-03-30 RX ORDER — ASPIRIN 81 MG/1
81 TABLET ORAL DAILY
Status: DISCONTINUED | OUTPATIENT
Start: 2023-03-30 | End: 2023-04-04 | Stop reason: HOSPADM

## 2023-03-30 RX ORDER — ONDANSETRON 4 MG/1
4 TABLET, ORALLY DISINTEGRATING ORAL EVERY 8 HOURS PRN
Status: DISCONTINUED | OUTPATIENT
Start: 2023-03-30 | End: 2023-04-04 | Stop reason: HOSPADM

## 2023-03-30 RX ORDER — OXYCODONE HYDROCHLORIDE 5 MG/1
10 TABLET ORAL EVERY 4 HOURS PRN
Status: DISCONTINUED | OUTPATIENT
Start: 2023-03-30 | End: 2023-04-04 | Stop reason: HOSPADM

## 2023-03-30 RX ORDER — SODIUM CHLORIDE 9 MG/ML
INJECTION, SOLUTION INTRAVENOUS PRN
Status: DISCONTINUED | OUTPATIENT
Start: 2023-03-30 | End: 2023-04-04 | Stop reason: HOSPADM

## 2023-03-30 RX ORDER — ONDANSETRON 2 MG/ML
4 INJECTION INTRAMUSCULAR; INTRAVENOUS EVERY 6 HOURS PRN
Status: DISCONTINUED | OUTPATIENT
Start: 2023-03-30 | End: 2023-04-04 | Stop reason: HOSPADM

## 2023-03-30 RX ORDER — ATORVASTATIN CALCIUM 10 MG/1
10 TABLET, FILM COATED ORAL NIGHTLY
Status: DISCONTINUED | OUTPATIENT
Start: 2023-03-30 | End: 2023-04-04 | Stop reason: HOSPADM

## 2023-03-30 RX ORDER — OXYCODONE HYDROCHLORIDE 5 MG/1
5 TABLET ORAL EVERY 4 HOURS PRN
Status: DISCONTINUED | OUTPATIENT
Start: 2023-03-30 | End: 2023-04-04 | Stop reason: HOSPADM

## 2023-03-30 RX ORDER — LEVOTHYROXINE SODIUM 0.1 MG/1
100 TABLET ORAL
Status: DISCONTINUED | OUTPATIENT
Start: 2023-03-31 | End: 2023-04-04 | Stop reason: HOSPADM

## 2023-03-30 RX ORDER — PANTOPRAZOLE SODIUM 40 MG/1
40 TABLET, DELAYED RELEASE ORAL 2 TIMES DAILY
Status: DISCONTINUED | OUTPATIENT
Start: 2023-03-30 | End: 2023-04-04 | Stop reason: HOSPADM

## 2023-03-30 RX ORDER — SODIUM CHLORIDE 0.9 % (FLUSH) 0.9 %
5-40 SYRINGE (ML) INJECTION EVERY 12 HOURS SCHEDULED
Status: DISCONTINUED | OUTPATIENT
Start: 2023-03-30 | End: 2023-04-04 | Stop reason: HOSPADM

## 2023-03-30 RX ORDER — 0.9 % SODIUM CHLORIDE 0.9 %
500 INTRAVENOUS SOLUTION INTRAVENOUS ONCE
Status: COMPLETED | OUTPATIENT
Start: 2023-03-30 | End: 2023-03-30

## 2023-03-30 RX ORDER — POLYETHYLENE GLYCOL 3350 17 G/17G
17 POWDER, FOR SOLUTION ORAL DAILY PRN
Status: DISCONTINUED | OUTPATIENT
Start: 2023-03-30 | End: 2023-04-04 | Stop reason: HOSPADM

## 2023-03-30 RX ADMIN — SODIUM CHLORIDE 500 ML: 9 INJECTION, SOLUTION INTRAVENOUS at 12:18

## 2023-03-30 ASSESSMENT — PAIN - FUNCTIONAL ASSESSMENT: PAIN_FUNCTIONAL_ASSESSMENT: NONE - DENIES PAIN

## 2023-03-30 ASSESSMENT — PAIN SCALES - GENERAL
PAINLEVEL_OUTOF10: 0
PAINLEVEL_OUTOF10: 0

## 2023-03-30 NOTE — H&P
Hospital Medicine History & Physical      PCP: Rebecca Borrero MD    Date of Admission: 3/30/2023    Date of Service: Pt seen/examined on 03/30/23 and Admitted to Inpatient with expected LOS greater than two midnights due to medical therapy. Chief Complaint:  fall, left hip pain      History Of Present Illness:    80 y.o. female who presented to [facility] with left hip pain following a fall. Patient extremely hard of hearing. History provided by ED. Patient fell today and had immediate left hip pain. Patient stated she blacked out but is vague on details and does not seem to have had any loss of consciousness. Patient has had two falls in a little over 24 hours. She comes from assisted living and is normally largely independent. Past Medical History:          Diagnosis Date    Thyroid disease     TIA (transient ischemic attack) 1980's       Past Surgical History:          Procedure Laterality Date    CATARACT REMOVAL      OU    HYSTERECTOMY (CERVIX STATUS UNKNOWN)         Medications Prior to Admission:      Prior to Admission medications    Medication Sig Start Date End Date Taking?  Authorizing Provider   atorvastatin (LIPITOR) 10 MG tablet Take 1 tablet by mouth nightly 2/1/23   LUCIANA Zavala CNP   diclofenac sodium (VOLTAREN) 1 % GEL Apply 4 g topically 4 times daily 9/9/21   Historical Provider, MD   ondansetron (ZOFRAN-ODT) 4 MG disintegrating tablet Take 1 tablet by mouth every 8 hours as needed for Nausea or Vomiting 3/15/22   Carlos A Wright MD   pantoprazole (PROTONIX) 40 MG tablet Take 40 mg by mouth 2 times daily 4/28/21   Historical Provider, MD   triamcinolone (KENALOG) 0.1 % ointment  1/25/22   Historical Provider, MD   aspirin 81 MG EC tablet Take 81 mg by mouth daily    Historical Provider, MD   vitamin B-12 (CYANOCOBALAMIN) 1000 MCG tablet Take 1,000 mcg by mouth daily    Historical Provider, MD   docusate sodium (COLACE) 100 MG capsule Take 100 mg by mouth 2 times daily

## 2023-03-30 NOTE — CONSULTS
5 - PS to COCO Majano at [facility] ortho  Re: Acute comminuted left greater trochanteric proximal femur fracture  1200 - PS Melecio called back to speak with COCO Huddleston

## 2023-03-30 NOTE — PLAN OF CARE
Problem: Safety - Adult  Goal: Free from fall injury  Outcome: Progressing  Bed in lowest position, wheels locked, 2/4 side rails up. Bed/ chair check alarm in place, call light within reach. Pt instructed to call out when needing assistance. Pt needs reinforcement.

## 2023-03-30 NOTE — ACP (ADVANCE CARE PLANNING)
Advance Care Planning     General Advance Care Planning (ACP) Conversation    Date of Conversation: 3/30/2023  Conducted with: Patient with Decision Making Capacity   Healthcare Decision Maker: Named in Advance Directive or Healthcare Power of  (name) sonBerta:    Primary Decision Maker: Jessica Samson - Oleksandr - [phone]  Click here to complete Healthcare Decision Makers including selection of the Healthcare Decision Maker Relationship (ie \"Primary\"). Today we documented Decision Maker(s) consistent with Legal Next of Kin hierarchy. Content/Action Overview:   Has ACP document(s) on file - reflects the patient's care preferences  Reviewed DNR/DNI and patient elects DNR order - referred to ACP Clinical Specialist & placed order  ventilation preferences  Would want ventilator if needed    Length of Voluntary ACP Conversation in minutes:  <16 minutes (Non-Billable)    JUANA Moraes

## 2023-03-30 NOTE — ED PROVIDER NOTES
I independently performed a history and physical on [employer]. All diagnostic, treatment, and disposition decisions were made by myself in conjunction with the advanced practice provider. I personally saw the patient and performed a substantive portion of the visit including all aspects of the medical decision making. For further details of [facility] emergency department encounter, please see COCO Ceron's documentation. Patient is a [address] female presenting today due to concern for allegedly falling yesterday complaining of hip pain but ultimately being discharged and having another fall today where she may have had a syncopal event and still having left hip pain. When I went into the room, she had no complaints of any pain at this point although her son did state that if she moved it would cause her left hip to hurt again. He was unaware of the fall from yesterday when I told him about that fall and thought she only had 1 fall this morning. She denies any headache or neck pain. No chest pain or abdominal pain. Other than left hip pain with movement, she has no other complaints at this time and was answering questions appropriately and her son did state that she was at baseline mentally as well. Physical:   Gen: No acute distress. AOx3.   Psych: Normal mood and affect  HEENT: NCAT,PERRL, MMM  Neck: supple, NTTP  Cardiac: RRR, pulses 2+ in lower extremities with left DP and right PT (left PT was 1+)    Lungs: C2AB, no R/R/W  Abdomen: soft and nontender with no R/D/G  Neuro: GCS equals 15, normal ankle dorsiflexion/plantarflexion bilaterally with strength 5 out of 5 in moving both upper extremities with no focal neurodeficits noted, limited left lower extremity exam due to reported left hip pain  MSK: No tenderness to palpation to bilateral shoulder/elbow/wrist along with bilateral knees and ankles although she does complain of some pain to the left hip although only when
PHYSICAL EXAM  1 or more Elements     ED Triage Vitals [03/30/23 0940]   BP Temp Temp Source Heart Rate Resp SpO2 Height Weight   (!) 160/76 97.5 °F (36.4 °C) Oral 85 18 95 % -- 100 lb (45.4 kg)       Physical Exam  Vitals and nursing note reviewed. Constitutional:       General: She is not in acute distress. Appearance: She is well-developed. She is not diaphoretic. HENT:      Head: Normocephalic and atraumatic. Eyes:      General:         Right eye: No discharge. Left eye: No discharge. Pulmonary:      Effort: Pulmonary effort is normal. No respiratory distress. Breath sounds: No stridor. Musculoskeletal:      Cervical back: Full passive range of motion without pain, normal range of motion and neck supple. No spinous process tenderness. Right hip: Bony tenderness present. No lacerations. Decreased range of motion. Right upper leg: Bony tenderness present. No lacerations. Right knee: Normal.        Legs:    Skin:     General: Skin is warm and dry. Coloration: Skin is not pale. Neurological:      Mental Status: She is alert and oriented to person, place, and time. Comments: No gross facial drooping. Moves all 4 extremities spontaneously.    Psychiatric:         Mood and Affect: Mood normal.         Behavior: Behavior normal.       DIAGNOSTIC RESULTS   LABS:    Labs Reviewed   CBC WITH AUTO DIFFERENTIAL - Abnormal; Notable for the following components:       Result Value    RBC 3.78 (*)     Hemoglobin 11.8 (*)     RDW 16.0 (*)     Lymphocytes Absolute 0.4 (*)     All other components within normal limits   COMPREHENSIVE METABOLIC PANEL - Abnormal; Notable for the following components:    Glucose 102 (*)     BUN 32 (*)     ALT 9 (*)     AST 14 (*)     All other components within normal limits   TROPONIN - Abnormal; Notable for the following components:    Troponin 0.03 (*)     All other components within normal limits   TROPONIN - Abnormal; Notable for the

## 2023-03-31 LAB
ANION GAP SERPL CALCULATED.3IONS-SCNC: 9 MMOL/L (ref 3–16)
BASOPHILS # BLD: 0 K/UL (ref 0–0.2)
BASOPHILS NFR BLD: 0.7 %
BUN SERPL-MCNC: 28 MG/DL (ref 7–20)
CALCIUM SERPL-MCNC: 8.7 MG/DL (ref 8.3–10.6)
CHLORIDE SERPL-SCNC: 106 MMOL/L (ref 99–110)
CO2 SERPL-SCNC: 22 MMOL/L (ref 21–32)
CREAT SERPL-MCNC: 0.6 MG/DL (ref 0.6–1.2)
DEPRECATED RDW RBC AUTO: 15.6 % (ref 12.4–15.4)
EOSINOPHIL # BLD: 0.1 K/UL (ref 0–0.6)
EOSINOPHIL NFR BLD: 3.7 %
GFR SERPLBLD CREATININE-BSD FMLA CKD-EPI: >60 ML/MIN/{1.73_M2}
GLUCOSE SERPL-MCNC: 85 MG/DL (ref 70–99)
HCT VFR BLD AUTO: 32.4 % (ref 36–48)
HGB BLD-MCNC: 10.8 G/DL (ref 12–16)
LYMPHOCYTES # BLD: 0.5 K/UL (ref 1–5.1)
LYMPHOCYTES NFR BLD: 12.3 %
MCH RBC QN AUTO: 31.5 PG (ref 26–34)
MCHC RBC AUTO-ENTMCNC: 33.3 G/DL (ref 31–36)
MCV RBC AUTO: 94.5 FL (ref 80–100)
MONOCYTES # BLD: 0.3 K/UL (ref 0–1.3)
MONOCYTES NFR BLD: 7.9 %
NEUTROPHILS # BLD: 2.9 K/UL (ref 1.7–7.7)
NEUTROPHILS NFR BLD: 75.4 %
PLATELET # BLD AUTO: 173 K/UL (ref 135–450)
PMV BLD AUTO: 7.7 FL (ref 5–10.5)
POTASSIUM SERPL-SCNC: 4 MMOL/L (ref 3.5–5.1)
RBC # BLD AUTO: 3.43 M/UL (ref 4–5.2)
SODIUM SERPL-SCNC: 137 MMOL/L (ref 136–145)
WBC # BLD AUTO: 3.8 K/UL (ref 4–11)

## 2023-03-31 PROCEDURE — 85025 COMPLETE CBC W/AUTO DIFF WBC: CPT

## 2023-03-31 PROCEDURE — 97166 OT EVAL MOD COMPLEX 45 MIN: CPT

## 2023-03-31 PROCEDURE — 97162 PT EVAL MOD COMPLEX 30 MIN: CPT

## 2023-03-31 PROCEDURE — 1200000000 HC SEMI PRIVATE

## 2023-03-31 PROCEDURE — 99232 SBSQ HOSP IP/OBS MODERATE 35: CPT | Performed by: SPECIALIST/TECHNOLOGIST

## 2023-03-31 PROCEDURE — 97110 THERAPEUTIC EXERCISES: CPT

## 2023-03-31 PROCEDURE — 2580000003 HC RX 258: Performed by: INTERNAL MEDICINE

## 2023-03-31 PROCEDURE — 97530 THERAPEUTIC ACTIVITIES: CPT

## 2023-03-31 PROCEDURE — 36415 COLL VENOUS BLD VENIPUNCTURE: CPT

## 2023-03-31 PROCEDURE — 80048 BASIC METABOLIC PNL TOTAL CA: CPT

## 2023-03-31 PROCEDURE — 97535 SELF CARE MNGMENT TRAINING: CPT

## 2023-03-31 PROCEDURE — APPNB45 APP NON BILLABLE 31-45 MINUTES: Performed by: SPECIALIST/TECHNOLOGIST

## 2023-03-31 PROCEDURE — 6370000000 HC RX 637 (ALT 250 FOR IP): Performed by: INTERNAL MEDICINE

## 2023-03-31 RX ADMIN — PANTOPRAZOLE SODIUM 40 MG: 40 TABLET, DELAYED RELEASE ORAL at 20:23

## 2023-03-31 RX ADMIN — SODIUM CHLORIDE, PRESERVATIVE FREE 10 ML: 5 INJECTION INTRAVENOUS at 20:24

## 2023-03-31 RX ADMIN — ATORVASTATIN CALCIUM 10 MG: 10 TABLET, FILM COATED ORAL at 20:23

## 2023-03-31 NOTE — PLAN OF CARE
MRI completed. There is an appearance of extension slightly into the IT region of the left hip but not complete. Discussed this in detail with Dr Cora Giralod who also reviewed with Dr Sonja Saleh with regards to options for care at this time. Based on the very minimal extension of the greater troch fracture it is felt that this can be best treated conservatively for now. Will keep her TTWB LLE for stand, pivot, transfer only. Will continue with abductor precautions. Can continue with pain control and ice for any swelling. Will plan to have her f/u with Dr Sonja Saleh as an outpatient in another 7-10 days. Will recheck films at that time and monitor for progression of healing. At this time we will plan to sign off. If questions or things change feel free to reach out to us.       Yonny Gurrola PA-C

## 2023-03-31 NOTE — DISCHARGE INSTR - COC
Last 1 Encounters:   03/30/23 100 lb (45.4 kg)     Mental Status:  disoriented and alert    IV Access:  - None    Nursing Mobility/ADLs:  Walking   Dependent  Transfer  Dependent  Bathing  Dependent  Dressing  Dependent  Toileting  Dependent  Feeding  Assisted  Med Admin  Dependent  Med Delivery   whole    Wound Care Documentation and Therapy:  Wound 03/15/22 Leg Distal;Left;Lateral open red (Active)   Number of days: 381        Elimination:  Continence: Bowel: No  Bladder: No  Urinary Catheter: None and Removal Date 04/03/2023    Colostomy/Ileostomy/Ileal Conduit: No       Date of Last BM: 04/04/2023    Intake/Output Summary (Last 24 hours) at 3/31/2023 1529  Last data filed at 3/30/2023 1614  Gross per 24 hour   Intake --   Output 125 ml   Net -125 ml     I/O last 3 completed shifts:  In: -   Out: 125 [Urine:125]    Safety Concerns:     History of Falls (last 30 days) and At Risk for Falls    Impairments/Disabilities:      None    Nutrition Therapy:  Current Nutrition Therapy:   - Oral Diet:  General    Routes of Feeding: Oral  Liquids: Thin Liquids  Daily Fluid Restriction: no  Last Modified Barium Swallow with Video (Video Swallowing Test): not done    Treatments at the Time of Hospital Discharge:   Respiratory Treatments: ***  Oxygen Therapy:  is not on home oxygen therapy.   Ventilator:    - No ventilator support    Rehab Therapies: {THERAPEUTIC INTERVENTION:4163966008}  Weight Bearing Status/Restrictions: Touchdown weight bearing (10-25 lbs) only on left leg  Other Medical Equipment (for information only, NOT a DME order):  {EQUIPMENT:[id number]}  Other Treatments: ***    Patient's personal belongings (please select all that are sent with patient):  Glasses, Hearing Aides bilateral, Dentures lower    RN SIGNATURE:  Electronically signed by Rachid Fuller RN on 4/4/23 at 2:10 PM EDT    CASE MANAGEMENT/SOCIAL WORK SECTION    Inpatient Status Date: 3/30/23    Readmission Risk Assessment Score:  Readmission Risk

## 2023-03-31 NOTE — CONSULTS
Department of Orthopedic Surgery  Physician Assistant   Consult Note        Reason for Consult:  left hip pain  Requesting Physician: Tyson Tinajero MD  Date of Service: 3/31/2023 12:49 PM    CHIEF COMPLAINT:  As Above    History Obtained From:  patient, electronic medical record    HISTORY OF PRESENT ILLNESS:                The patient is a 80 y.o. female who presents with above chief complaint. Pt sustained a fall today and also has been noted to have multiple falls recently. Was in the ER recently and had plain films of the hip that did not reveal any fx. After this fall a CT was obtained on the left hip and showed a greater troch fracture. She does not provide any real history today. Did talk with the son today and he is willing to proceed with evaluation and not sure at this time about surgery but will think things over until we finish our workup and see if she would even need surgery. Past Medical History:        Diagnosis Date    Thyroid disease     TIA (transient ischemic attack) 1980's     Past Surgical History:        Procedure Laterality Date    CATARACT REMOVAL      OU    HYSTERECTOMY (CERVIX STATUS UNKNOWN)           Medications Prior to Admission:   Prior to Admission medications    Medication Sig Start Date End Date Taking?  Authorizing Provider   atorvastatin (LIPITOR) 10 MG tablet Take 1 tablet by mouth nightly 2/1/23   LUCIANA Romero - CNP   diclofenac sodium (VOLTAREN) 1 % GEL Apply 4 g topically 4 times daily 9/9/21   Historical Provider, MD   ondansetron (ZOFRAN-ODT) 4 MG disintegrating tablet Take 1 tablet by mouth every 8 hours as needed for Nausea or Vomiting 3/15/22   Tyson Tinajero MD   pantoprazole (PROTONIX) 40 MG tablet Take 40 mg by mouth 2 times daily 4/28/21   Historical Provider, MD   triamcinolone (KENALOG) 0.1 % ointment  1/25/22   Historical Provider, MD   aspirin 81 MG EC tablet Take 81 mg by mouth daily    Historical Provider, MD   vitamin B-12 (CYANOCOBALAMIN) 1000

## 2023-04-01 PROCEDURE — 97530 THERAPEUTIC ACTIVITIES: CPT

## 2023-04-01 PROCEDURE — 2580000003 HC RX 258: Performed by: INTERNAL MEDICINE

## 2023-04-01 PROCEDURE — 2580000003 HC RX 258: Performed by: NURSE PRACTITIONER

## 2023-04-01 PROCEDURE — 6370000000 HC RX 637 (ALT 250 FOR IP): Performed by: INTERNAL MEDICINE

## 2023-04-01 PROCEDURE — 1200000000 HC SEMI PRIVATE

## 2023-04-01 RX ORDER — 0.9 % SODIUM CHLORIDE 0.9 %
500 INTRAVENOUS SOLUTION INTRAVENOUS ONCE
Status: COMPLETED | OUTPATIENT
Start: 2023-04-01 | End: 2023-04-01

## 2023-04-01 RX ADMIN — LEVOTHYROXINE SODIUM 100 MCG: 0.1 TABLET ORAL at 08:22

## 2023-04-01 RX ADMIN — PANTOPRAZOLE SODIUM 40 MG: 40 TABLET, DELAYED RELEASE ORAL at 08:22

## 2023-04-01 RX ADMIN — SODIUM CHLORIDE, PRESERVATIVE FREE 10 ML: 5 INJECTION INTRAVENOUS at 21:04

## 2023-04-01 RX ADMIN — ATORVASTATIN CALCIUM 10 MG: 10 TABLET, FILM COATED ORAL at 21:04

## 2023-04-01 RX ADMIN — PANTOPRAZOLE SODIUM 40 MG: 40 TABLET, DELAYED RELEASE ORAL at 21:04

## 2023-04-01 RX ADMIN — SODIUM CHLORIDE 500 ML: 9 INJECTION, SOLUTION INTRAVENOUS at 05:16

## 2023-04-01 RX ADMIN — SODIUM CHLORIDE, PRESERVATIVE FREE 10 ML: 5 INJECTION INTRAVENOUS at 08:22

## 2023-04-02 PROCEDURE — 1200000000 HC SEMI PRIVATE

## 2023-04-02 PROCEDURE — 2580000003 HC RX 258: Performed by: INTERNAL MEDICINE

## 2023-04-02 PROCEDURE — 6370000000 HC RX 637 (ALT 250 FOR IP): Performed by: INTERNAL MEDICINE

## 2023-04-02 RX ADMIN — PANTOPRAZOLE SODIUM 40 MG: 40 TABLET, DELAYED RELEASE ORAL at 09:45

## 2023-04-02 RX ADMIN — SODIUM CHLORIDE, PRESERVATIVE FREE 10 ML: 5 INJECTION INTRAVENOUS at 20:28

## 2023-04-02 RX ADMIN — SODIUM CHLORIDE, PRESERVATIVE FREE 10 ML: 5 INJECTION INTRAVENOUS at 09:46

## 2023-04-02 RX ADMIN — ATORVASTATIN CALCIUM 10 MG: 10 TABLET, FILM COATED ORAL at 20:26

## 2023-04-02 RX ADMIN — PANTOPRAZOLE SODIUM 40 MG: 40 TABLET, DELAYED RELEASE ORAL at 20:26

## 2023-04-02 RX ADMIN — LEVOTHYROXINE SODIUM 100 MCG: 0.1 TABLET ORAL at 06:11

## 2023-04-03 LAB
ANION GAP SERPL CALCULATED.3IONS-SCNC: 5 MMOL/L (ref 3–16)
BASOPHILS # BLD: 0 K/UL (ref 0–0.2)
BASOPHILS NFR BLD: 0.9 %
BUN SERPL-MCNC: 25 MG/DL (ref 7–20)
CALCIUM SERPL-MCNC: 8.7 MG/DL (ref 8.3–10.6)
CHLORIDE SERPL-SCNC: 106 MMOL/L (ref 99–110)
CO2 SERPL-SCNC: 25 MMOL/L (ref 21–32)
CREAT SERPL-MCNC: 0.6 MG/DL (ref 0.6–1.2)
DEPRECATED RDW RBC AUTO: 15.6 % (ref 12.4–15.4)
EOSINOPHIL # BLD: 0.2 K/UL (ref 0–0.6)
EOSINOPHIL NFR BLD: 5.4 %
GFR SERPLBLD CREATININE-BSD FMLA CKD-EPI: >60 ML/MIN/{1.73_M2}
GLUCOSE SERPL-MCNC: 85 MG/DL (ref 70–99)
HCT VFR BLD AUTO: 31.4 % (ref 36–48)
HGB BLD-MCNC: 10.5 G/DL (ref 12–16)
LYMPHOCYTES # BLD: 0.7 K/UL (ref 1–5.1)
LYMPHOCYTES NFR BLD: 20.2 %
MCH RBC QN AUTO: 31.5 PG (ref 26–34)
MCHC RBC AUTO-ENTMCNC: 33.5 G/DL (ref 31–36)
MCV RBC AUTO: 93.9 FL (ref 80–100)
MONOCYTES # BLD: 0.3 K/UL (ref 0–1.3)
MONOCYTES NFR BLD: 8.4 %
NEUTROPHILS # BLD: 2.1 K/UL (ref 1.7–7.7)
NEUTROPHILS NFR BLD: 65.1 %
PLATELET # BLD AUTO: 192 K/UL (ref 135–450)
PMV BLD AUTO: 7.4 FL (ref 5–10.5)
POTASSIUM SERPL-SCNC: 4.4 MMOL/L (ref 3.5–5.1)
RBC # BLD AUTO: 3.34 M/UL (ref 4–5.2)
SODIUM SERPL-SCNC: 136 MMOL/L (ref 136–145)
WBC # BLD AUTO: 3.3 K/UL (ref 4–11)

## 2023-04-03 PROCEDURE — 1200000000 HC SEMI PRIVATE

## 2023-04-03 PROCEDURE — 97535 SELF CARE MNGMENT TRAINING: CPT

## 2023-04-03 PROCEDURE — 97110 THERAPEUTIC EXERCISES: CPT

## 2023-04-03 PROCEDURE — 97530 THERAPEUTIC ACTIVITIES: CPT

## 2023-04-03 PROCEDURE — 6370000000 HC RX 637 (ALT 250 FOR IP): Performed by: INTERNAL MEDICINE

## 2023-04-03 PROCEDURE — 85025 COMPLETE CBC W/AUTO DIFF WBC: CPT

## 2023-04-03 PROCEDURE — 2580000003 HC RX 258: Performed by: INTERNAL MEDICINE

## 2023-04-03 PROCEDURE — 36415 COLL VENOUS BLD VENIPUNCTURE: CPT

## 2023-04-03 PROCEDURE — 80048 BASIC METABOLIC PNL TOTAL CA: CPT

## 2023-04-03 RX ORDER — ACETAMINOPHEN 325 MG/1
650 TABLET ORAL EVERY 4 HOURS PRN
Status: DISCONTINUED | OUTPATIENT
Start: 2023-04-03 | End: 2023-04-04 | Stop reason: HOSPADM

## 2023-04-03 RX ADMIN — PANTOPRAZOLE SODIUM 40 MG: 40 TABLET, DELAYED RELEASE ORAL at 09:35

## 2023-04-03 RX ADMIN — PANTOPRAZOLE SODIUM 40 MG: 40 TABLET, DELAYED RELEASE ORAL at 19:39

## 2023-04-03 RX ADMIN — LEVOTHYROXINE SODIUM 100 MCG: 0.1 TABLET ORAL at 06:40

## 2023-04-03 RX ADMIN — SODIUM CHLORIDE, PRESERVATIVE FREE 10 ML: 5 INJECTION INTRAVENOUS at 19:39

## 2023-04-03 RX ADMIN — SODIUM CHLORIDE, PRESERVATIVE FREE 10 ML: 5 INJECTION INTRAVENOUS at 09:37

## 2023-04-03 RX ADMIN — ATORVASTATIN CALCIUM 10 MG: 10 TABLET, FILM COATED ORAL at 19:38

## 2023-04-03 ASSESSMENT — PAIN SCALES - GENERAL
PAINLEVEL_OUTOF10: 0
PAINLEVEL_OUTOF10: 0

## 2023-04-04 VITALS
OXYGEN SATURATION: 95 % | SYSTOLIC BLOOD PRESSURE: 118 MMHG | HEART RATE: 77 BPM | WEIGHT: 100 LBS | BODY MASS INDEX: 16.66 KG/M2 | TEMPERATURE: 98.7 F | DIASTOLIC BLOOD PRESSURE: 72 MMHG | RESPIRATION RATE: 20 BRPM | HEIGHT: 65 IN

## 2023-04-04 PROCEDURE — 97110 THERAPEUTIC EXERCISES: CPT

## 2023-04-04 PROCEDURE — 97530 THERAPEUTIC ACTIVITIES: CPT

## 2023-04-04 PROCEDURE — 2580000003 HC RX 258: Performed by: INTERNAL MEDICINE

## 2023-04-04 PROCEDURE — 97116 GAIT TRAINING THERAPY: CPT

## 2023-04-04 PROCEDURE — 97535 SELF CARE MNGMENT TRAINING: CPT

## 2023-04-04 PROCEDURE — 6370000000 HC RX 637 (ALT 250 FOR IP): Performed by: INTERNAL MEDICINE

## 2023-04-04 RX ORDER — OXYCODONE HYDROCHLORIDE 5 MG/1
5 TABLET ORAL EVERY 6 HOURS PRN
Qty: 28 TABLET | Refills: 0 | Status: SHIPPED | OUTPATIENT
Start: 2023-04-04 | End: 2023-04-11

## 2023-04-04 RX ADMIN — SODIUM CHLORIDE, PRESERVATIVE FREE 10 ML: 5 INJECTION INTRAVENOUS at 09:07

## 2023-04-04 RX ADMIN — PANTOPRAZOLE SODIUM 40 MG: 40 TABLET, DELAYED RELEASE ORAL at 09:06

## 2023-04-04 RX ADMIN — LEVOTHYROXINE SODIUM 100 MCG: 0.1 TABLET ORAL at 05:57

## 2023-04-04 ASSESSMENT — PAIN SCALES - GENERAL
PAINLEVEL_OUTOF10: 0
PAINLEVEL_OUTOF10: 0

## 2023-04-04 NOTE — PROGRESS NOTES
4 Eyes Skin Assessment     The patient is being assess for   Admission    I agree that 2 RN's have performed a thorough Head to Toe Skin Assessment on the patient. ALL assessment sites listed below have been assessed. Areas assessed by both nurses:   [x]   Head, Face, and Ears   [x]   Shoulders, Back, and Chest, Abdomen  [x]   Arms, Elbows, and Hands   [x]   Coccyx, Sacrum, and Ischium  [x]   Legs, Feet, and Heels        Scattered abrasions and bruising, Blanchable redness buttocks and blanchable redness along spine. Vascular discoloration BLE.    **SHARE this note so that the co-signing nurse is able to place an eSignature**    Co-signer eSignature: Electronically signed by Isabella Ng RN on 3/30/23 at 3:44 PM EDT    Does the Patient have Skin Breakdown?   No          Harpreet Prevention initiated:  No   Wound Care Orders initiated:  no      WOC nurse consulted for Pressure Injury (Stage 3,4, Unstageable, DTI, NWPT, Complex wounds)and New or Established Ostomies:  No      Primary Nurse eSignature: Electronically signed by Ranjana Hall RN on 3/30/23 at 3:43 PM EDT
Hospitalist Progress Note      PCP: Ledy Raya MD    Date of Admission: 3/30/2023    Chief Complaint: fall, left hip pain    Hospital Course:   80 y.o. female who presented to [facility] with left hip pain following a fall. Patient extremely hard of hearing. History provided by ED. Patient fell today and had immediate left hip pain. Patient stated she blacked out but is vague on details and does not seem to have had any loss of consciousness. Patient has had two falls in a little over 24 hours. She comes from assisted living and is normally largely independent. Subjective: more alert today. Worked well with therapy. Pain well controlled       Medications:  Reviewed    Infusion Medications    sodium chloride       Scheduled Medications    morphine  2 mg IntraVENous Once    [Held by provider] aspirin  81 mg Oral Daily    atorvastatin  10 mg Oral Nightly    levothyroxine  100 mcg Oral QAM AC    pantoprazole  40 mg Oral BID    sodium chloride flush  5-40 mL IntraVENous 2 times per day     PRN Meds: sodium chloride flush, sodium chloride, ondansetron **OR** ondansetron, polyethylene glycol, oxyCODONE **OR** oxyCODONE    No intake or output data in the 24 hours ending 03/31/23 1634    Physical Exam Performed:    BP (!) 148/82   Pulse 84   Temp 98 °F (36.7 °C) (Oral)   Resp 16   Wt 100 lb (45.4 kg)   SpO2 96%   BMI 16.64 kg/m²     General appearance: No apparent distress, frail  HEENT: Pupils equal, round, and reactive to light. Conjunctivae/corneas clear. Neck: Supple, with full range of motion. No jugular venous distention. Trachea midline. Respiratory:  Normal respiratory effort. Clear to auscultation, bilaterally without Rales/Wheezes/Rhonchi. Cardiovascular: Regular rate and rhythm with normal S1/S2 without murmurs, rubs or gallops. Abdomen: Soft, non-tender, non-distended with normal bowel sounds. Musculoskeletal: No clubbing, cyanosis or edema bilaterally.  Left hip tenderness and
Knabb NP notified of low urine output, 150 mL out of pulido since 2041. 1x bolus of 500 mL to be given over 3 hours.
Occupational Therapy  Facility/Department: [facility] C5 - MED SURG/ORTHO  Daily Treatment Note  NAME: Ronda Gay  : 1920  MRN: [id number]    Date of Service: 2023    Discharge Recommendations:  Subacute/Skilled Nursing Facility  OT Equipment Recommendations  Equipment Needed: Lilian Gay scored a 14/24 on the AM-PAC ADL Inpatient form. Current research shows that an AM-PAC score of 17 or less is typically not associated with a discharge to the patient's home setting. Based on the patient's AM-PAC score and their current ADL deficits, it is recommended that the patient have 3-5 sessions per week of Occupational Therapy at d/c to increase the patient's independence. Please see assessment section for further patient specific details. If patient discharges prior to next session this note will serve as a discharge summary. Please see below for the latest assessment towards goals. Patient Diagnosis(es): The primary encounter diagnosis was Fall, initial encounter. A diagnosis of Closed fracture of trochanter of left femur, initial encounter [facility]) was also pertinent to this visit. Assessment    Assessment: Pt continues to be functioning below baseline this date. Pt tolerated sit<>stand and stand step transfer with chair with min/mod A x2 using RW. Pt unable to safely maintain TTWB during stand step transfer. Anticipate pt would benefit from ongoing OT in order to increase functional status prior to returning home. Activity Tolerance: Patient limited by endurance; Patient limited by fatigue;Patient tolerated treatment well  Discharge Recommendations: Subacute/Skilled Nursing Facility  Equipment Needed: No      Plan   Occupational Therapy Plan  Times Per Week: 4-6x  Current Treatment Recommendations: Strengthening;Balance training;Functional mobility training; Endurance training;Gait training; Safety education & training;Equipment evaluation, education, & procurement;Self-Care / ADL;Pain
Occupational Therapy  Facility/Department: [place] [id number] - MED SURG/ORTHO  Occupational Therapy Initial Assessment/Treatment    Name: Loura Aschoff  : 1920  MRN: [id number]  Date of Service: 3/31/2023    Discharge Recommendations:  Subacute/Skilled Nursing Facility  OT Equipment Recommendations  Equipment Needed: No       Patient Diagnosis(es): The primary encounter diagnosis was Fall, initial encounter. A diagnosis of Closed fracture of trochanter of left femur, initial encounter [facility]) was also pertinent to this visit. Past Medical History:  has a past medical history of Thyroid disease and TIA (transient ischemic attack). Past Surgical History:  has a past surgical history that includes Cataract removal and Hysterectomy. Treatment Diagnosis: impaired ADLs      Assessment   Performance deficits / Impairments: Decreased functional mobility ; Decreased strength;Decreased endurance;Decreased ADL status; Decreased safe awareness;Decreased high-level IADLs;Decreased balance;Decreased posture  Assessment: Pt is a 81 yo female admitted to [facility] s/p L hip fracture. Per ortho, non-surgical treatment approach with LLE TTWB restriction in place. Pt states PTA living in a senior housing apartment with assist provided for toileting, dressing, bathing, and IADLs. Pt states able to perform functional mobility for household distances with mod I. Currently pt is CGA-min A for bed mobility, mod Ax2 for STS. Pt unable to perform SPT this date due to fatigue requiring use of stedy and manual assist for maintaining LLE TTWB. Pt required total A for toileting this date. Pt is presenting below baseline and would benefit from skilled OT services during acute hospital stay to address the above deficits. Recommend SNF at discharge to further address ADL and mobility deficits prior to pt returning home.   Treatment Diagnosis: impaired ADLs  Prognosis: Fair  Decision Making: Medium Complexity  REQUIRES OT FOLLOW-UP: Yes  Activity
Physician Progress Note      PATIENT:               Autumn Isidro  CSN #:                  [id number]  :                       1920  ADMIT DATE:       3/30/2023 9:35 AM  DISCH DATE:  RESPONDING  PROVIDER #:        Vivi Quintana MD          QUERY TEXT:    Patient admitted with hip fracture. Noted to have a BMI of 16.64, advanced   age, frailty documented. If possible, please document in progress notes and   discharge summary if you are evaluating and /or treating any of the following: The medical record reflects the following:  Risk Factors: Hip fracture, advanced age  Clinical Indicators: BMI 16.64. Per progress note  \"General appearance: No   apparent distress, frail\". Cdiff rule out  Treatment: I&O's, dietary supplements, serial labs, supportive care, CM    ASPEN Criteria:    https://aspenjournals. onlinelibrary. farmer. com/doi/full/10.1177/849008363715433  5    Thank you,  Samuel Query RN BSN  Options provided:  -- Protein calorie malnutrition severe  -- Other - I will add my own diagnosis  -- Disagree - Not applicable / Not valid  -- Disagree - Clinically unable to determine / Unknown  -- Refer to Clinical Documentation Reviewer    PROVIDER RESPONSE TEXT:    This patient has severe protein calorie malnutrition.     Query created by: Blanca Hinojosa on 2023 8:52 AM      Electronically signed by:  Vivi Quintana MD 2023 8:55 AM
Report called into NELLIE Dietrich at [facility]. All questions and concerns were addressed.
Bearing  Partial Weight Bearing Percentage Or Pounds: TTWB for transfers only per ortho note. Position Activity Restriction  Other position/activity restrictions: catheter, IV access. Subjective   Subjective  Subjective: Pt in bed, eating breakfast, agreeable and pleasant for therapy. Reports pain in L hip after session, RN made aware. Vitals WFL. Orientation  Overall Orientation Status: Impaired  Orientation Level: Oriented to person;Disoriented to place;Oriented to situation;Disoriented to time  Cognition  Overall Cognitive Status: Exceptions  Arousal/Alertness: Appropriate responses to stimuli  Following Commands: Follows one step commands with increased time; Follows one step commands with repetition  Attention Span: Appears intact  Memory: Appears intact  Safety Judgement: Decreased awareness of need for assistance  Problem Solving: Assistance required to identify errors made;Assistance required to implement solutions  Insights: Decreased awareness of deficits  Initiation: Requires cues for some  Sequencing: Requires cues for some        Objective    Vitals  Vitals  Heart Rate: 83  Heart Rate Source: Monitor  BP: 123/64  BP Location: Right upper arm  BP Method: Automatic  Patient Position: Sitting  MAP (Calculated): 84  SpO2: 96 %  O2 Device: None (Room air)  Bed Mobility Training  Bed Mobility Training: Yes  Overall Level of Assistance: Minimum assistance; Additional time;Assist X2;Adaptive equipment  Rolling: Contact-guard assistance  Supine to Sit: Assist X2;Minimum assistance; Additional time; Adaptive equipment  Sit to Supine: Other (comment)  Balance  Sitting: Intact  Standing: Impaired  Standing - Static: Constant support;Poor  Standing - Dynamic: Constant support;Poor  Transfer Training  Transfer Training: Yes  Overall Level of Assistance: Assist X2;Adaptive equipment; Additional time; Moderate assistance;Minimum assistance  Interventions: Demonstration;Manual cues; Safety awareness training;Weight
education & training;Patient/Caregiver education & training;Equipment evaluation, education, & procurement; Modalities; Therapeutic activities     Restrictions  Restrictions/Precautions  Restrictions/Precautions: General Precautions, Weight Bearing  Lower Extremity Weight Bearing Restrictions  Left Lower Extremity Weight Bearing: Toe Touch Weight Bearing  Partial Weight Bearing Percentage Or Pounds: TTWB for transfers only per ortho note  Position Activity Restriction  Other position/activity restrictions: purewick     Subjective    Subjective  Subjective: Pt resting in bed on approach; agreeable to therapy  Pain: has \"a  little\" pain with movement  Orientation  Overall Orientation Status: Within Functional Limits  Orientation Level: Oriented to place;Oriented to time;Oriented to person;Oriented to situation  Cognition  Overall Cognitive Status: Exceptions  Arousal/Alertness: Appropriate responses to stimuli  Following Commands: Follows one step commands with increased time; Follows one step commands with repetition  Memory: Decreased recall of precautions  Initiation: Requires cues for some  Sequencing: Requires cues for some     Objective   Vitals  Heart Rate: 74  BP: 134/80  MAP (Calculated): 98  SpO2: 94 %  Bed Mobility Training  Bed Mobility Training: Yes  Overall Level of Assistance: Minimum assistance;Assist X1;Additional time; Adaptive equipment  Interventions: Verbal cues; Tactile cues  Supine to Sit: Minimum assistance;Assist X1;Additional time; Adaptive equipment (to R with HOB elevated)  Scooting: Stand-by assistance;Contact-guard assistance (to EOB)  Duration: 8 (SBA/CGA to sit EOB to brush teeth with OT)  Balance  Sitting: Impaired  Sitting - Static: Good (unsupported)  Sitting - Dynamic: Fair (occasional)  Standing: Impaired (RW)  Standing - Static: Constant support;Poor  Standing - Dynamic: Constant support;Poor  Transfer Training  Transfer Training: Yes  Overall Level of Assistance: Assist X2;Adaptive
treatment. Orientation  Overall Orientation Status: Within Functional Limits  Orientation Level: Oriented to place;Oriented to time;Oriented to person;Oriented to situation    Pain: has \"a  little\" pain with movement    Cognition  Overall Cognitive Status: Exceptions  Arousal/Alertness: Appropriate responses to stimuli  Following Commands: Follows one step commands with increased time; Follows one step commands with repetition  Memory: Decreased recall of precautions  Initiation: Requires cues for some  Sequencing: Requires cues for some        Objective    Vitals  /80  HR 74  SPO2 93%       Bed Mobility Training  Bed Mobility Training: Yes  Overall Level of Assistance: Minimum assistance;Assist X1;Additional time; Adaptive equipment  Interventions: Verbal cues; Tactile cues  Supine to Sit: Minimum assistance;Assist X1;Additional time; Adaptive equipment (to R with HOB elevated)  Scooting: Stand-by assistance;Contact-guard assistance (to EOB)      Balance  Sitting: Impaired  Sitting - Static: Good (unsupported)  Sitting - Dynamic: Fair (occasional)  Standing: Impaired (RW)  Standing - Static: Constant support;Poor  Standing - Dynamic: Constant support;Poor    Transfer Training  Transfer Training: Yes  Overall Level of Assistance: Assist X2;Adaptive equipment; Additional time; Moderate assistance;Minimum assistance  Interventions: Demonstration;Manual cues; Safety awareness training;Weight shifting training/pressure relief;Verbal cues; Tactile cues; Visual cues  Sit to Stand: Additional time; Adaptive equipment;Assist X2;Moderate assistance;Minimum assistance (RW)  Stand to Sit: Adaptive equipment; Additional time;Assist X2;Minimum assistance; Moderate assistance (RW)  Bed to Chair: Assist X2;Minimum assistance; Moderate assistance; Additional time; Adaptive equipment (RW)       ADL  Grooming: Setup;Minimal assistance;Verbal cueing; Increased time to complete; Moderate assistance  Grooming Skilled Clinical Factors: brushing
05/04/2020. Could represent an acute injury. CT HIP LEFT WO CONTRAST   Preliminary Result   Acute comminuted left greater trochanteric proximal femur fracture. No   intertrochanteric extension identified although osteopenia limits the exam.      Consider MR imaging to exclude intertrochanteric extension. XR CHEST PORTABLE   Final Result   No acute cardiopulmonary findings             IP CONSULT TO ORTHOPEDIC SURGERY  IP CONSULT TO HOSPITALIST    Assessment/Plan:    Active Hospital Problems    Diagnosis     Closed left hip fracture, initial encounter ([address] [S72.002A]      Left hip fracture  - ortho surgery consulted  - MRI left hip showing mild intertrochanteric extension  - pain control ordered  - plan for non-op management  - will need close ortho follow up  - PT/OT     HLD  - continue statin     Hypothyroidism  - continue synthroid     H/O TIA  - holding ASA for now. Continue statin    DVT Prophylaxis: lovenox  Diet: ADULT DIET; Regular  Code Status: Limited  PT/OT Eval Status: ordered    Dispo - SNF pending pre-cert.  Medically stable for discharge    Appropriate for A1 Discharge Unit: Yes      Cierra Morales MD
ASA held for now but anticipate resuming at discharge. DVT Prophylaxis: IPC     Recent Labs     04/03/23  0626        Diet: ADULT DIET; Regular  Code Status: Limited      PT/OT Eval Status: seen w/ recs for SNF    Transitional Care:  not yet discussed/ordered    Dispo - Patient is likely to remain in-house at least until Tues/Wed 4/5 April pending clinical course, subspecialty recs and placement decision.        Stephanie Zapata MD
Constant support;Poor  Standing - Dynamic: Constant support;Poor  Transfer Training  Transfer Training: Yes  Overall Level of Assistance: Assist X2;Adaptive equipment;Additional time;Moderate assistance;Minimum assistance  Interventions: Demonstration;Manual cues;Safety awareness training;Weight shifting training/pressure relief;Verbal cues;Tactile cues;Visual cues  Sit to Stand: Additional time;Adaptive equipment;Assist X2;Moderate assistance;Minimum assistance  Stand to Sit: Adaptive equipment;Additional time;Assist X2;Minimum assistance;Moderate assistance  Bed to Chair: Assist X2;Minimum assistance;Moderate assistance;Additional time;Adaptive equipment  Toilet Transfer: Moderate assistance;Assist X2 (steady)  Gait Training  Gait Training: No (transfers only per chart)  Gait  Overall Level of Assistance: Minimum assistance;Assist X2;Moderate assistance  Interventions: Visual cues;Demonstration;Manual cues;Tactile cues;Verbal cues;Safety awareness training;Weight shifting training/pressure relief  Base of Support: Narrowed  Speed/Dee: Delayed  Distance (ft): 4 Feet  Assistive Device: Walker, rolling     PT Exercises  Exercise Treatment: BLE supine: AP, heelsides  X 10 with A LLE, seated LAQ X 10 BLE     Safety Devices  Type of Devices: Call light within reach;Chair alarm in place;Gait belt;Nurse notified;Left in chair;Patient at risk for falls;All fall risk precautions in place;Heels elevated for pressure relief;All marilee prominences offloaded  Restraints  Restraints Initially in Place: No       Goals  Short Term Goals  Time Frame for Short Term Goals: 7 days (4/7) unless otherwise noted.  Short Term Goal 1: pt will demonstrate MIN A X1 for bed mobility.; 4/3 progressing  Short Term Goal 2: pt will demonstrate MIN A for functional transfers with LRAD while maintaining TTWB precautions.; 4/3 progressing  Short Term Goal 3: pt will perform bed<>chair transfer with LRAD and MIN A while maintaining TTWB status.; 
Prophylaxis: IPC     Recent Labs     04/03/23  0626          Diet: ADULT DIET; Regular  Code Status: Limited      PT/OT Eval Status: seen w/ recs for SNF    Transitional Care:  not yet discussed/ordered    Dispo - Patient is likely to remain in-house at least until Tues/Wed 4/5 April pending clinical course, subspecialty recs and placement decision - tentatively [facility].        Jeanna Hernandez MD
deformity T3, compression deformity T4 present on previous CT   CT done 05/04/2020. Could represent an acute injury. CT HIP LEFT WO CONTRAST   Preliminary Result   Acute comminuted left greater trochanteric proximal femur fracture. No   intertrochanteric extension identified although osteopenia limits the exam.      Consider MR imaging to exclude intertrochanteric extension. XR CHEST PORTABLE   Final Result   No acute cardiopulmonary findings             IP CONSULT TO ORTHOPEDIC SURGERY  IP CONSULT TO HOSPITALIST    Assessment/Plan:    Active Hospital Problems    Diagnosis     Closed left hip fracture, initial encounter ([facility] [address] [S72.002A]      Left hip fracture  - ortho surgery consulted  - MRI left hip showing mild intertrochanteric extension  - pain control ordered  - plan for non-op management  - will need close ortho follow up  - PT/OT     HLD  - continue statin     Hypothyroidism  - continue synthroid     H/O TIA  - holding ASA for now. Continue statin    DVT Prophylaxis: lovenox  Diet: ADULT DIET; Regular  Code Status: Limited  PT/OT Eval Status: ordered    Dispo - SNF pending pre-cert.  Medically stable for discharge    Appropriate for A1 Discharge Unit: Yes      Kristian Schirmer, MD
greater trochanter through the superolateral cortex. No extension into the   inferior medial intertrochanteric cortex. 2. Bruising and edema in the periarticular left hip musculature. 3. Partially imaged mild edema in the right posterior iliac wing and sacral   ala likely from bone contusion. Assessment:      left greater trochanter fracture with minimal extension into the intertrochanteric region. Plan:      1:  Will continue with non-operative management of the left greater trochanter fracture. Detailed discussion was had with Dr Allen Rothman regarding fracture extension approximately 33% into the IT region, close outpatient follow up with Dr Diana Jhaveri will be necessary to reevaluate for further fracture extension after a trial of non-operative intervention. Strict Toe touch wegithbearing to the LLE for stand pivot transfers, PT/OT consult ordered. Pt will need higher level of care at discharge, she currently lives in assisted living at [facility] with visiting maria isabel. Pt's son Quin Frankel and grandson were present at bedside, fracture was explained in detail along with expected recovery and possibility that the fracture could progress.  The patient and family were in agreement with non-operative management   2:  Continue Deep venous thrombosis prophylaxis  3:  Continue Pain Control  4: PT/OT ordered, CM updated on plan    COCO Reyes
Cognition   Orientation  Overall Orientation Status: Impaired  Orientation Level: Oriented to person;Disoriented to place;Oriented to situation;Disoriented to time (states current date is march 1922.)     Objective   Heart Rate: 84  BP: (!) 148/82  MAP (Calculated): 104  SpO2: 96 %  Comment: HR 84 BPM, SPO2 96% on room air, /82     Observation/Palpation  Posture: Fair (in standing with STEDY with MAX VCs and tactile cues to maintain NWB status. pt demonstrates very pronounced kyphosis.)  Gross Assessment  AROM: Grossly decreased, non-functional (RLE demonstrates AROM that is WFL, LLE demonstrates AROM WFL in knee and ankle, hip demonstrates ~50% of full AROM, limited by pain.)  PROM: Grossly decreased, non-functional  Strength: Grossly decreased, non-functional (3+/5 MMT score in all major m uscle groups of RLE, 3-/5 MMT score in all major muscle groupd of LLE.)  Sensation: Intact (to light touch in BLE.)         Bed Mobility Training  Bed Mobility Training: Yes  Overall Level of Assistance: Minimum assistance; Additional time;Assist X2;Adaptive equipment (pt required MIN A to advance her LLE and for trunk support during sup<>sit transfer, performed with HOB elevated and use of BR.)  Rolling: Contact-guard assistance (for rolls R and L.)  Supine to Sit: Assist X2;Minimum assistance; Additional time; Adaptive equipment  Sit to Supine: Adaptive equipment; Additional time;Assist X2;Minimum assistance  Scooting:  Moderate assistance  Balance  Sitting: With support (pt demonstrated R lateral lean in an effort to unweight LLE secondary to increased discomfrot with weightbearing.)  Standing: Impaired (standing performed initially in steady, after trasnfer to chair pt performed 1 sit<>stand with RW with MOD A X2, required VCs and tactile cues, pt reports being fearful of falling and does not bring RUE up to RW.)  Standing - Static: Constant support;Poor  Standing - Dynamic: Constant support;Poor  Transfer
discharge summary. Please see case management note for discharge disposition. Thank you.

## 2023-04-04 NOTE — PLAN OF CARE
Problem: Discharge Planning  Goal: Discharge to home or other facility with appropriate resources  Outcome: Progressing     Problem: Safety - Adult  Goal: Free from fall injury  Outcome: Progressing  Bed in lowest position, wheels locked, 2/4 side rails up, nonskid footwear on. Bed/ chair check alarm in place, call light within reach. Pt instructed to call out when needing assistance. Problem: Skin/Tissue Integrity  Goal: Absence of new skin breakdown  Description: 1. Monitor for areas of redness and/or skin breakdown  2. Assess vascular access sites hourly  3. Every 4-6 hours minimum:  Change oxygen saturation probe site  4. Every 4-6 hours:  If on nasal continuous positive airway pressure, respiratory therapy assess nares and determine need for appliance change or resting period.   Outcome: Progressing     Problem: ABCDS Injury Assessment  Goal: Absence of physical injury  Outcome: Progressing

## 2023-04-04 NOTE — CARE COORDINATION
CASE MANAGEMENT DISCHARGE SUMMARY      Discharge to: skilled to [address] completed: [facility] Exemption Notification (HENS) completed: yes. Document ID : [id number]    IMM given: (date)     New Durable Medical Equipment ordered/agency:     Transportation:       Medical Transport explained to pt/family. Pt/family voice no agency preference. Agency used: [facility]   time: 9989-5764   Ambulance form completed: Yes    Confirmed discharge plan with:     Patient: yes     Family:  yes. See previous CM note     Facility/Agency, name:  [facility] able to be pulled from Epic by KIMBERLEE Mistry at [facility] aware     Phone number for report to facility:  [phone]     RN, name: Aruna    Note: Discharging nurse to complete [facility], reconcile AVS, and place final copy with patient's discharge packet. RN to ensure that written prescriptions for  Level II medications are sent with patient to the facility as per protocol.     Adam Kauffman RN
Patient has been accepted at [facility] if SNF needed.   Electronically signed by JUANA Peoples on 3/30/2023 at 3:49 PM
Per discussion with ortho provider, pt will likely need SNF. Need therapy recs for precert. No therapy evaluations in chart at this time. Family has chosen [facility]. Cert needed. IF therapy recs are not in this afternoon, cert will have to wait til Monday for approval, as [facility] is closed over the weekend. In addition, Carey Fuller, at facility states she will have to \"re-run benefits at the first of the month anyway\". CM team will continue to follow.
Per provider at [phone] huddle, pt discharge ready. Transport arranged with [facility] for [phone]  with [facility]. Nursing and son made aware.
Precert approved for [facility], via [place] portal. Expires 4/5/23. [place] [id number]. CM messaged provider that cert is back.
choice list with basic dialogue that supports the patient's individualized plan of care/goals and shares the quality data associated with the providers was provided to: (P) Patient Representative   Patient Representative Name: (P) Cherry chu     The Patient and/or Patient Representative Agree with the Discharge Plan?  (P) Yes    JUANA Nuñez, NAOHS  Case Management Department  Ph: [phone] Fax: [phone]

## 2023-04-06 ENCOUNTER — TELEPHONE (OUTPATIENT)
Dept: ORTHOPEDIC SURGERY | Age: 88
End: 2023-04-06

## 2023-04-18 ENCOUNTER — OFFICE VISIT (OUTPATIENT)
Dept: ORTHOPEDIC SURGERY | Age: 88
End: 2023-04-18

## 2023-04-18 VITALS — HEIGHT: 65 IN | BODY MASS INDEX: 16.66 KG/M2 | WEIGHT: 100 LBS

## 2023-04-18 DIAGNOSIS — S72.112D DISPLACED FRACTURE OF GREATER TROCHANTER OF LEFT FEMUR, SUBSEQUENT ENCOUNTER FOR CLOSED FRACTURE WITH ROUTINE HEALING: Primary | ICD-10-CM

## 2023-04-18 DIAGNOSIS — M25.552 LEFT HIP PAIN: ICD-10-CM

## 2023-04-18 NOTE — PROGRESS NOTES
management  Pain control  Advance to 50% weightbearing left lower extremity  Walker to assist  Recommend return to clinic in 4 to 6 weeks for repeat x-rays, likely to advance to full weightbearing at that time.     Britney Smith MD

## 2023-05-07 ENCOUNTER — APPOINTMENT (OUTPATIENT)
Dept: GENERAL RADIOLOGY | Age: 88
End: 2023-05-07
Payer: MEDICARE

## 2023-05-07 ENCOUNTER — HOSPITAL ENCOUNTER (EMERGENCY)
Age: 88
Discharge: HOME | End: 2023-05-07
Attending: EMERGENCY MEDICINE
Payer: MEDICARE

## 2023-05-07 ENCOUNTER — APPOINTMENT (OUTPATIENT)
Dept: CT IMAGING | Age: 88
End: 2023-05-07
Payer: MEDICARE

## 2023-05-07 VITALS
DIASTOLIC BLOOD PRESSURE: 70 MMHG | TEMPERATURE: 97 F | RESPIRATION RATE: 16 BRPM | HEART RATE: 70 BPM | SYSTOLIC BLOOD PRESSURE: 165 MMHG | OXYGEN SATURATION: 96 %

## 2023-05-07 DIAGNOSIS — S09.90XA CLOSED HEAD INJURY, INITIAL ENCOUNTER: Primary | ICD-10-CM

## 2023-05-07 PROCEDURE — 99284 EMERGENCY DEPT VISIT MOD MDM: CPT

## 2023-05-07 PROCEDURE — 72125 CT NECK SPINE W/O DYE: CPT

## 2023-05-07 PROCEDURE — 71045 X-RAY EXAM CHEST 1 VIEW: CPT

## 2023-05-07 PROCEDURE — 72170 X-RAY EXAM OF PELVIS: CPT

## 2023-05-07 PROCEDURE — 70450 CT HEAD/BRAIN W/O DYE: CPT

## 2023-05-07 ASSESSMENT — PAIN - FUNCTIONAL ASSESSMENT
PAIN_FUNCTIONAL_ASSESSMENT: NONE - DENIES PAIN
PAIN_FUNCTIONAL_ASSESSMENT: 0-10

## 2023-05-07 ASSESSMENT — PAIN SCALES - GENERAL: PAINLEVEL_OUTOF10: 4

## 2023-05-16 ENCOUNTER — OFFICE VISIT (OUTPATIENT)
Dept: ORTHOPEDIC SURGERY | Age: 88
End: 2023-05-16
Payer: MEDICARE

## 2023-05-16 VITALS — HEIGHT: 65 IN | WEIGHT: 100 LBS | BODY MASS INDEX: 16.66 KG/M2

## 2023-05-16 DIAGNOSIS — S72.112D DISPLACED FRACTURE OF GREATER TROCHANTER OF LEFT FEMUR, SUBSEQUENT ENCOUNTER FOR CLOSED FRACTURE WITH ROUTINE HEALING: Primary | ICD-10-CM

## 2023-05-16 PROCEDURE — 1123F ACP DISCUSS/DSCN MKR DOCD: CPT | Performed by: ORTHOPAEDIC SURGERY

## 2023-05-16 PROCEDURE — 99213 OFFICE O/P EST LOW 20 MIN: CPT | Performed by: ORTHOPAEDIC SURGERY

## 2023-05-16 NOTE — PROGRESS NOTES
ORTHOPAEDIC SURGERY FOLLOWUP VISIT     CHIEF COMPLAINT: Left hip injury     DATE OF INJURY: 3/29/2023  DIAGNOSIS: Left greater trochanter fracture  DATE OF SURGERY: N/A     HISTORY OF PRESENT ILLNESS:  8-year-old female presents 6 weeks post injury. She was noted to have a displaced greater trochanter fracture. MRI demonstrated minimal propagation of the fracture line across the intertrochanteric region. Decision was made to manage her nonoperatively. Overall, she rates her pain 0 out of 10. When she has pain, her pain is in the upper aspect of the thigh. She denies groin pain. She has been compliant with weightbearing restrictions. No complaints today. PHYSICAL EXAM:  General: Well-appearing. No distress. Presents in wheelchair. Left lower extremity: No cuts, open wounds, or abrasions. Flexion/extension. No pain with gentle internal/external rotation of the hip at 90 degrees of flexion. Minimal tenderness to palpation about the greater trochanter. No tenderness about the anterior thigh. There is no pain reproduced with gentle hip. Sensation is intact to light touch in deep peroneal, superficial peroneal, tibial, sural, and saphenous nerve distributions. Motor function is intact to EHL, FHL, tibialis anterior, and gastroc. There is brisk capillary refill to the toes and a strong palpable dorsalis pedis pulse. Compartments are soft and compressible. There is no calf tenderness and a negative Homans' sign. RADIOGRAPHIC EXAM:  AP pelvis and AP/frog lateral views of the left hip demonstrate tip of the greater trochanter fracture. No evidence of occult intertrochanteric fracture line. No significant periosteal callus. Consolidation of tip of trochanter fracture. Overall there is diffuse osteopenia. No significant degenerative changes of the left hip. ASSESSMENT AND PLAN:  Left greater trochanter fracture, minimal extension into the intertrochanteric region    Healing uneventfully.

## 2023-09-21 ENCOUNTER — HOSPITAL ENCOUNTER (INPATIENT)
Age: 88
LOS: 2 days | Discharge: HOME | DRG: 641 | End: 2023-09-23
Attending: EMERGENCY MEDICINE
Payer: MEDICARE

## 2023-09-21 ENCOUNTER — APPOINTMENT (OUTPATIENT)
Dept: GENERAL RADIOLOGY | Age: 88
DRG: 641 | End: 2023-09-21
Payer: MEDICARE

## 2023-09-21 ENCOUNTER — APPOINTMENT (OUTPATIENT)
Dept: CT IMAGING | Age: 88
DRG: 641 | End: 2023-09-21
Payer: MEDICARE

## 2023-09-21 DIAGNOSIS — S09.90XA INJURY OF HEAD, INITIAL ENCOUNTER: ICD-10-CM

## 2023-09-21 DIAGNOSIS — R79.89 ELEVATED TROPONIN: ICD-10-CM

## 2023-09-21 DIAGNOSIS — S01.01XA LACERATION OF SCALP, INITIAL ENCOUNTER: ICD-10-CM

## 2023-09-21 DIAGNOSIS — S51.811A SKIN TEAR OF RIGHT FOREARM WITHOUT COMPLICATION, INITIAL ENCOUNTER: ICD-10-CM
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
## 2023-09-21 LAB
ALBUMIN SERPL-MCNC: 3.4 G/DL (ref 3.4–5)
ALBUMIN/GLOB SERPL: 1.2 {RATIO} (ref 1.1–2.2)
ALP SERPL-CCNC: 102 U/L (ref 40–129)
ALT SERPL-CCNC: 10 U/L (ref 10–40)
ANION GAP SERPL CALCULATED.3IONS-SCNC: 11 MMOL/L (ref 3–16)
AST SERPL-CCNC: 15 U/L (ref 15–37)
BASOPHILS # BLD: 0 K/UL (ref 0–0.2)
BASOPHILS NFR BLD: 0.5 %
BILIRUB SERPL-MCNC: <0.2 MG/DL (ref 0–1)
BUN SERPL-MCNC: 26 MG/DL (ref 7–20)
CALCIUM SERPL-MCNC: 8.8 MG/DL (ref 8.3–10.6)
CHLORIDE SERPL-SCNC: 104 MMOL/L (ref 99–110)
CO2 SERPL-SCNC: 20 MMOL/L (ref 21–32)
CREAT SERPL-MCNC: 0.6 MG/DL (ref 0.6–1.2)
DEPRECATED RDW RBC AUTO: 16.3 % (ref 12.4–15.4)
EOSINOPHIL # BLD: 0.2 K/UL (ref 0–0.6)
EOSINOPHIL NFR BLD: 3.1 %
GFR SERPLBLD CREATININE-BSD FMLA CKD-EPI: >60 ML/MIN/{1.73_M2}
GLUCOSE SERPL-MCNC: 99 MG/DL (ref 70–99)
HCT VFR BLD AUTO: 33.8 % (ref 36–48)
HGB BLD-MCNC: 11.1 G/DL (ref 12–16)
LYMPHOCYTES # BLD: 0.5 K/UL (ref 1–5.1)
LYMPHOCYTES NFR BLD: 9.1 %
MCH RBC QN AUTO: 32.3 PG (ref 26–34)
MCHC RBC AUTO-ENTMCNC: 32.9 G/DL (ref 31–36)
MCV RBC AUTO: 98 FL (ref 80–100)
MONOCYTES # BLD: 0.5 K/UL (ref 0–1.3)
MONOCYTES NFR BLD: 8.3 %
NEUTROPHILS # BLD: 4.4 K/UL (ref 1.7–7.7)
NEUTROPHILS NFR BLD: 79 %
PLATELET # BLD AUTO: 241 K/UL (ref 135–450)
PMV BLD AUTO: 7.1 FL (ref 5–10.5)
POTASSIUM SERPL-SCNC: 4.6 MMOL/L (ref 3.5–5.1)
PROT SERPL-MCNC: 6.3 G/DL (ref 6.4–8.2)
RBC # BLD AUTO: 3.45 M/UL (ref 4–5.2)
SODIUM SERPL-SCNC: 135 MMOL/L (ref 136–145)
TROPONIN, HIGH SENSITIVITY: 21 NG/L (ref 0–14)
TROPONIN, HIGH SENSITIVITY: 24 NG/L (ref 0–14)
WBC # BLD AUTO: 5.6 K/UL (ref 4–11)

## 2023-09-21 PROCEDURE — 1200000000 HC SEMI PRIVATE

## 2023-09-21 PROCEDURE — 73090 X-RAY EXAM OF FOREARM: CPT

## 2023-09-21 PROCEDURE — 71045 X-RAY EXAM CHEST 1 VIEW: CPT

## 2023-09-21 PROCEDURE — 85025 COMPLETE CBC W/AUTO DIFF WBC: CPT

## 2023-09-21 PROCEDURE — 2580000003 HC RX 258: Performed by: STUDENT IN AN ORGANIZED HEALTH CARE EDUCATION/TRAINING PROGRAM

## 2023-09-21 PROCEDURE — 80053 COMPREHEN METABOLIC PANEL: CPT

## 2023-09-21 PROCEDURE — 6370000000 HC RX 637 (ALT 250 FOR IP): Performed by: STUDENT IN AN ORGANIZED HEALTH CARE EDUCATION/TRAINING PROGRAM

## 2023-09-21 PROCEDURE — 99285 EMERGENCY DEPT VISIT HI MDM: CPT

## 2023-09-21 PROCEDURE — 70450 CT HEAD/BRAIN W/O DYE: CPT

## 2023-09-21 PROCEDURE — 72125 CT NECK SPINE W/O DYE: CPT

## 2023-09-21 PROCEDURE — 93005 ELECTROCARDIOGRAM TRACING: CPT | Performed by: NURSE PRACTITIONER

## 2023-09-21 PROCEDURE — 12001 RPR S/N/AX/GEN/TRNK 2.5CM/<: CPT

## 2023-09-21 PROCEDURE — 84484 ASSAY OF TROPONIN QUANT: CPT

## 2023-09-21 RX ORDER — ACETAMINOPHEN 650 MG/1
650 SUPPOSITORY RECTAL EVERY 6 HOURS PRN
Status: DISCONTINUED | OUTPATIENT
Start: 2023-09-21 | End: 2023-09-23 | Stop reason: HOSPADM

## 2023-09-21 RX ORDER — LEVOTHYROXINE SODIUM 0.1 MG/1
100 TABLET ORAL
Status: DISCONTINUED | OUTPATIENT
Start: 2023-09-22 | End: 2023-09-23 | Stop reason: HOSPADM

## 2023-09-21 RX ORDER — POLYETHYLENE GLYCOL 3350 17 G/17G
17 POWDER, FOR SOLUTION ORAL DAILY PRN
Status: DISCONTINUED | OUTPATIENT
Start: 2023-09-21 | End: 2023-09-23 | Stop reason: HOSPADM

## 2023-09-21 RX ORDER — ASPIRIN 81 MG/1
81 TABLET ORAL DAILY
Status: DISCONTINUED | OUTPATIENT
Start: 2023-09-22 | End: 2023-09-23 | Stop reason: HOSPADM

## 2023-09-21 RX ORDER — SODIUM CHLORIDE 9 MG/ML
INJECTION, SOLUTION INTRAVENOUS PRN
Status: DISCONTINUED | OUTPATIENT
Start: 2023-09-21 | End: 2023-09-23 | Stop reason: HOSPADM

## 2023-09-21 RX ORDER — SODIUM CHLORIDE 0.9 % (FLUSH) 0.9 %
5-40 SYRINGE (ML) INJECTION EVERY 12 HOURS SCHEDULED
Status: DISCONTINUED | OUTPATIENT
Start: 2023-09-21 | End: 2023-09-23 | Stop reason: HOSPADM

## 2023-09-21 RX ORDER — ACETAMINOPHEN 325 MG/1
650 TABLET ORAL EVERY 6 HOURS PRN
Status: DISCONTINUED | OUTPATIENT
Start: 2023-09-21 | End: 2023-09-23 | Stop reason: HOSPADM

## 2023-09-21 RX ORDER — ENOXAPARIN SODIUM 100 MG/ML
30 INJECTION SUBCUTANEOUS DAILY
Status: DISCONTINUED | OUTPATIENT
Start: 2023-09-22 | End: 2023-09-23 | Stop reason: HOSPADM

## 2023-09-21 RX ORDER — PANTOPRAZOLE SODIUM 40 MG/1
40 TABLET, DELAYED RELEASE ORAL 2 TIMES DAILY
Status: DISCONTINUED | OUTPATIENT
Start: 2023-09-21 | End: 2023-09-23 | Stop reason: HOSPADM

## 2023-09-21 RX ORDER — SODIUM CHLORIDE, SODIUM LACTATE, POTASSIUM CHLORIDE, CALCIUM CHLORIDE 600; 310; 30; 20 MG/100ML; MG/100ML; MG/100ML; MG/100ML
INJECTION, SOLUTION INTRAVENOUS CONTINUOUS
Status: DISCONTINUED | OUTPATIENT
Start: 2023-09-21 | End: 2023-09-23 | Stop reason: HOSPADM

## 2023-09-21 RX ORDER — ATORVASTATIN CALCIUM 10 MG/1
10 TABLET, FILM COATED ORAL NIGHTLY
Status: DISCONTINUED | OUTPATIENT
Start: 2023-09-21 | End: 2023-09-23 | Stop reason: HOSPADM

## 2023-09-21 RX ORDER — ONDANSETRON 4 MG/1
4 TABLET, ORALLY DISINTEGRATING ORAL EVERY 8 HOURS PRN
Status: DISCONTINUED | OUTPATIENT
Start: 2023-09-21 | End: 2023-09-23 | Stop reason: HOSPADM

## 2023-09-21 RX ORDER — SODIUM CHLORIDE 0.9 % (FLUSH) 0.9 %
5-40 SYRINGE (ML) INJECTION PRN
Status: DISCONTINUED | OUTPATIENT
Start: 2023-09-21 | End: 2023-09-23 | Stop reason: HOSPADM

## 2023-09-21 RX ORDER — ONDANSETRON 2 MG/ML
4 INJECTION INTRAMUSCULAR; INTRAVENOUS EVERY 6 HOURS PRN
Status: DISCONTINUED | OUTPATIENT
Start: 2023-09-21 | End: 2023-09-23 | Stop reason: HOSPADM

## 2023-09-21 RX ADMIN — Medication 10 ML: at 23:00

## 2023-09-21 RX ADMIN — PANTOPRAZOLE SODIUM 40 MG: 40 TABLET, DELAYED RELEASE ORAL at 23:00

## 2023-09-21 RX ADMIN — ATORVASTATIN CALCIUM 10 MG: 10 TABLET, FILM COATED ORAL at 23:00

## 2023-09-21 RX ADMIN — SODIUM CHLORIDE, POTASSIUM CHLORIDE, SODIUM LACTATE AND CALCIUM CHLORIDE: 600; 310; 30; 20 INJECTION, SOLUTION INTRAVENOUS at 22:59

## 2023-09-21 RX ADMIN — ACETAMINOPHEN 650 MG: 325 TABLET ORAL at 23:16

## 2023-09-21 ASSESSMENT — PAIN DESCRIPTION - DESCRIPTORS: DESCRIPTORS: BURNING

## 2023-09-21 ASSESSMENT — PAIN DESCRIPTION - LOCATION: LOCATION: ARM

## 2023-09-21 ASSESSMENT — PAIN - FUNCTIONAL ASSESSMENT
PAIN_FUNCTIONAL_ASSESSMENT: PREVENTS OR INTERFERES SOME ACTIVE ACTIVITIES AND ADLS
PAIN_FUNCTIONAL_ASSESSMENT: 0-10

## 2023-09-21 ASSESSMENT — PAIN DESCRIPTION - ORIENTATION: ORIENTATION: RIGHT

## 2023-09-21 ASSESSMENT — PAIN SCALES - GENERAL
PAINLEVEL_OUTOF10: 4
PAINLEVEL_OUTOF10: 5

## 2023-09-21 NOTE — ED PROVIDER NOTES
[address] [place] [address]  ED  EMERGENCY DEPARTMENT ENCOUNTER        Pt Name: Ivana Reyes  MRN: [id number]  [facility] 12/18/1920  Date of evaluation: 9/21/2023  Provider: LUCIANA Adorno - HARSHAL  PCP: Adam Velasco MD  Note Started: 2:55 PM EDT 9/21/23       I have seen and evaluated this patient with my supervising physician Catalina Serrano MD.    [address]       Chief Complaint   Patient presents with    Fall     Patient comes from [facility] after fall. Patient states she felt dizzy and fell to the ground. Patient not on blood thinners, did not have LOC. Patient with skin tear on right arm and laceration to right side of head. HISTORY OF PRESENT ILLNESS: 1 or more Elements     History From: Patient  Limitations to history : hearing loss    Ivana Reyes is a 80 y.o. female who presents to ER with fall. She reports she suddenly got dizzy and light headed and fell. Reports her right arm hurts, denies pain in her head, neck, back or chest. Patient is hard of hearing, does have hearing aids in but still having difficulty hearing me. She lives in assisted living at the [facility]. She uses a walker for ambulation. Chart reviewed and tetanus was last updated in 2020. Nursing Notes were all reviewed and agreed with or any disagreements were addressed in the HPI. REVIEW OF SYSTEMS :      Review of Systems    Positives and Pertinent negatives as per HPI.      MEDICAL HISTORY     Past Medical History:   Diagnosis Date    Thyroid disease     TIA (transient ischemic attack) 1980's       SURGICAL HISTORY     Past Surgical History:   Procedure Laterality Date    CATARACT REMOVAL      OU    HYSTERECTOMY (CERVIX STATUS UNKNOWN)         CURRENTMEDICATIONS       Previous Medications    ASPIRIN 81 MG EC TABLET    Take 81 mg by mouth daily    ATORVASTATIN (LIPITOR) 10 MG TABLET    Take 1 tablet by mouth nightly    DICLOFENAC SODIUM (VOLTAREN) 1 % GEL    Apply 4 g topically 4 understanding and gave verbal consent to proceed. 2% plain lidocaine mixed with 0.5% plain marcaine was used to obtain local anesthesia at the site of the wound. The wound was cleansed and irrigated and then prepped with antiseptic and draped in sterile fashion. The wound was explored to determine if there was any foreign body or debris. Then the wound was explored further to rule out tear to the galea or any bone involvement. Wound edges were closed with staples. 5 total staples were placed. Repaired length of the wound is 2 cm. There were no complications during the procedure and overall patient tolerated it wellHemostasis was obtained and topical antibacterial ointment was applied. CRITICAL CARE TIME (.cctime)       PAST MEDICAL HISTORY      has a past medical history of Thyroid disease and TIA (transient ischemic attack) (1980's). EMERGENCY DEPARTMENT COURSE and DIFFERENTIAL DIAGNOSIS/MDM:   Vitals:    Vitals:    09/21/23 1543 09/21/23 1630 09/21/23 1652 09/21/23 1753   BP:  128/61     Pulse: 81 85  79   Resp: 16 17 16 17   Temp:       TempSrc:       SpO2: 96% 95% 96% 94%   Weight:           Patient was given the following medications:  Medications - No data to display          Is this patient to be included in the SEP-1 Core Measure due to severe sepsis or septic shock? No   Exclusion criteria - the patient is NOT to be included for SEP-1 Core Measure due to: Infection is not suspected    Chronic Conditions affecting care:    has a past medical history of Thyroid disease and TIA (transient ischemic attack) (1980's). CONSULTS: (Who and What was discussed)  None    Records Reviewed (External and Source)     CC/HPI Summary, DDx, ED Course, and Reassessment: Luz Marina Ackerman presented to the ER with the above noted complaints. Laceration to the right posterior scalp, this was repaired per above procedure note. Right forearm skin tear was cleansed and dressed with versatile and guaze.  Flap was

## 2023-09-21 NOTE — H&P
V2.0  History and Physical      Name:  Prosper Stokes /Age/Sex: 1920  (80 y.o. female)   MRN & CSN:  [id number] & [id number] Encounter Date/Time: 2023 7:35 PM EDT   Location:   PCP: Leslie Kay MD       Hospital Day: 1    Assessment and Plan:   Prosper Stokes is a 80 y.o. female with a pmh of hypertension, osteoarthritis, likely dementia with hyperlipidemia and borderline intake with hypothyroidism who presents with Fall, initial encounter    Hospital Problems             Last Modified POA    * (Principal) Fall, initial encounter 2023 Yes       Falls likely in the setting of poor oral intake with severe dehydration: 3 falls in the last 1 year. There could be a component of lumbar spinal stenosis as there is numbness on the left foot. Severe osteoarthritis noticed. MRI of the lower back has been ordered. Orthostatic blood pressure to be checked. IV fluid hydration. PT OT for placement purposes. Patient dizziness any nursing home. Needs full rehab. Severe osteoarthritis  Benign essential hypertension  Bilateral hearing loss  Hyperlipidemia  Mild to moderate dementia  Hypothyroidism on levothyroxine  Suspected lumbar spinal stenosis MRI has been ordered        Comment: Please note this report has been produced using speech recognition software and may contain errors related to that system including errors in grammar, punctuation, and spelling, as well as words and phrases that may be inappropriate. If there are any questions or concerns please feel free to contact the dictating provider for clarification.      Disposition:   Current Living situation: NH  Expected Disposition: [facility]  Estimated D/C: 3-4 days    Diet No diet orders on file   DVT Prophylaxis [x] Lovenox, []  Heparin, [] SCDs, [] Ambulation,  [] Eliquis, [] Xarelto   Code Status Prior   Surrogate Decision Maker/ POA      History from:     patient    History of Present Illness:     Chief Complaint: Fall, initial

## 2023-09-21 NOTE — ED PROVIDER NOTES
vasovagal and/or dehydration as patient has slightly elevated BUN to creatinine ratio patient is not orthostatic vital signs are stable patient has been getting more dizzy recently. Family member reports she is not as active as she typically is. We will plan on admission for cardiac observation for possible syncope. Wound was repaired. Rest of labs otherwise unremarkable no signs systemic infection or sepsis. See ERIKA note for further details. I personally saw the patient and independently provided 0 minutes of nonconcurrent critical care out of the total shared critical care time provided    This includes multiple reevaluations, vital sign monitoring, pulse oximetry monitoring, telemetry monitoring, clinical response to the IV medications, reviewing the nursing notes, consultation time, dictation/documentation time, and interpretation of the labwork. (This time excludes time spent performing procedures). EKG: All EKG's are interpreted by the Emergency Department Physician who either signs or Co-signs this chart in the absence of a cardiologist.    EKG Interpretation    Interpreted by emergency department physician    Rhythm: normal sinus   Rate: normal  Axis: Left axis deviation  Ectopy: none  Conduction: normal  ST Segments: normal  T Waves: normal  Q Waves: none    Clinical Impression: Normal sinus rhythm, no significant T wave or ST changes. Normal WA interval, normal QRS duration, normal QT QTC. Left axis deviation. Otherwise normal EKG. Interpreted by myself.          Karen Akhtar MD  09/21/23 7559

## 2023-09-21 NOTE — ED NOTES
Repeat labs obtained and sent to lab at this time due to lab stating they have not received initial blood samples.       Jonathan Robert, NELLIE  09/21/23 4550

## 2023-09-21 NOTE — ED NOTES
Report given to [facility] HELIO BECKER.      Jonathan Robert, RN  09/21/23 7806

## 2023-09-21 NOTE — ED NOTES
Pt incont of stool. Pt cleaned up and changed into new hospital gown and attends. Pt assisted to [facility] with assist X2. Pt very unsteady on her feet. Pt assisted back to bed.   Placed on Pure Wick and given food per request.       eMlly Kim RN  09/21/23 8980

## 2023-09-22 ENCOUNTER — APPOINTMENT (OUTPATIENT)
Dept: MRI IMAGING | Age: 88
DRG: 641 | End: 2023-09-22
Payer: MEDICARE

## 2023-09-22 LAB
ANION GAP SERPL CALCULATED.3IONS-SCNC: 7 MMOL/L (ref 3–16)
BACTERIA URNS QL MICRO: ABNORMAL /HPF
BILIRUB UR QL STRIP.AUTO: NEGATIVE
BUN SERPL-MCNC: 19 MG/DL (ref 7–20)
CALCIUM SERPL-MCNC: 9 MG/DL (ref 8.3–10.6)
CHLORIDE SERPL-SCNC: 105 MMOL/L (ref 99–110)
CLARITY UR: CLEAR
CO2 SERPL-SCNC: 25 MMOL/L (ref 21–32)
COLOR UR: ABNORMAL
CREAT SERPL-MCNC: 0.6 MG/DL (ref 0.6–1.2)
DEPRECATED RDW RBC AUTO: 15.7 % (ref 12.4–15.4)
EKG ATRIAL RATE: 85 BPM
EKG DIAGNOSIS: NORMAL
EKG P AXIS: 41 DEGREES
EKG P-R INTERVAL: 182 MS
EKG Q-T INTERVAL: 382 MS
EKG QRS DURATION: 84 MS
EKG QTC CALCULATION (BAZETT): 454 MS
EKG R AXIS: -39 DEGREES
EKG T AXIS: 29 DEGREES
EKG VENTRICULAR RATE: 85 BPM
EPI CELLS #/AREA URNS HPF: ABNORMAL /HPF (ref 0–5)
GFR SERPLBLD CREATININE-BSD FMLA CKD-EPI: >60 ML/MIN/{1.73_M2}
GLUCOSE SERPL-MCNC: 97 MG/DL (ref 70–99)
GLUCOSE UR STRIP.AUTO-MCNC: NEGATIVE MG/DL
HCT VFR BLD AUTO: 37.7 % (ref 36–48)
HGB BLD-MCNC: 12.5 G/DL (ref 12–16)
HGB UR QL STRIP.AUTO: ABNORMAL
KETONES UR STRIP.AUTO-MCNC: NEGATIVE MG/DL
LEUKOCYTE ESTERASE UR QL STRIP.AUTO: ABNORMAL
MAGNESIUM SERPL-MCNC: 2 MG/DL (ref 1.8–2.4)
MCH RBC QN AUTO: 31.7 PG (ref 26–34)
MCHC RBC AUTO-ENTMCNC: 33.1 G/DL (ref 31–36)
MCV RBC AUTO: 95.8 FL (ref 80–100)
NITRITE UR QL STRIP.AUTO: NEGATIVE
PH UR STRIP.AUTO: 7 [PH] (ref 5–8)
PHOSPHATE SERPL-MCNC: 2.8 MG/DL (ref 2.5–4.9)
PLATELET # BLD AUTO: 231 K/UL (ref 135–450)
PMV BLD AUTO: 7.6 FL (ref 5–10.5)
POTASSIUM SERPL-SCNC: 4 MMOL/L (ref 3.5–5.1)
PROT UR STRIP.AUTO-MCNC: NEGATIVE MG/DL
RBC # BLD AUTO: 3.93 M/UL (ref 4–5.2)
RBC #/AREA URNS HPF: ABNORMAL /HPF (ref 0–4)
SODIUM SERPL-SCNC: 137 MMOL/L (ref 136–145)
SP GR UR STRIP.AUTO: 1.01 (ref 1–1.03)
UA DIPSTICK W REFLEX MICRO PNL UR: YES
URN SPEC COLLECT METH UR: ABNORMAL
UROBILINOGEN UR STRIP-ACNC: 0.2 E.U./DL
WBC # BLD AUTO: 3.5 K/UL (ref 4–11)
WBC #/AREA URNS HPF: ABNORMAL /HPF (ref 0–5)

## 2023-09-22 PROCEDURE — 84100 ASSAY OF PHOSPHORUS: CPT

## 2023-09-22 PROCEDURE — 97530 THERAPEUTIC ACTIVITIES: CPT

## 2023-09-22 PROCEDURE — 97162 PT EVAL MOD COMPLEX 30 MIN: CPT

## 2023-09-22 PROCEDURE — 72148 MRI LUMBAR SPINE W/O DYE: CPT

## 2023-09-22 PROCEDURE — 80048 BASIC METABOLIC PNL TOTAL CA: CPT

## 2023-09-22 PROCEDURE — 2580000003 HC RX 258: Performed by: STUDENT IN AN ORGANIZED HEALTH CARE EDUCATION/TRAINING PROGRAM

## 2023-09-22 PROCEDURE — 85027 COMPLETE CBC AUTOMATED: CPT

## 2023-09-22 PROCEDURE — 36415 COLL VENOUS BLD VENIPUNCTURE: CPT

## 2023-09-22 PROCEDURE — 81001 URINALYSIS AUTO W/SCOPE: CPT

## 2023-09-22 PROCEDURE — 97166 OT EVAL MOD COMPLEX 45 MIN: CPT

## 2023-09-22 PROCEDURE — 83735 ASSAY OF MAGNESIUM: CPT

## 2023-09-22 PROCEDURE — 93010 ELECTROCARDIOGRAM REPORT: CPT | Performed by: INTERNAL MEDICINE

## 2023-09-22 PROCEDURE — 1200000000 HC SEMI PRIVATE

## 2023-09-22 PROCEDURE — 97535 SELF CARE MNGMENT TRAINING: CPT

## 2023-09-22 PROCEDURE — 6360000002 HC RX W HCPCS: Performed by: STUDENT IN AN ORGANIZED HEALTH CARE EDUCATION/TRAINING PROGRAM

## 2023-09-22 PROCEDURE — 6370000000 HC RX 637 (ALT 250 FOR IP): Performed by: NURSE PRACTITIONER

## 2023-09-22 PROCEDURE — 6370000000 HC RX 637 (ALT 250 FOR IP): Performed by: STUDENT IN AN ORGANIZED HEALTH CARE EDUCATION/TRAINING PROGRAM

## 2023-09-22 RX ORDER — LANOLIN ALCOHOL/MO/W.PET/CERES
3 CREAM (GRAM) TOPICAL ONCE
Status: COMPLETED | OUTPATIENT
Start: 2023-09-22 | End: 2023-09-22

## 2023-09-22 RX ADMIN — SODIUM CHLORIDE, POTASSIUM CHLORIDE, SODIUM LACTATE AND CALCIUM CHLORIDE: 600; 310; 30; 20 INJECTION, SOLUTION INTRAVENOUS at 15:16

## 2023-09-22 RX ADMIN — ENOXAPARIN SODIUM 30 MG: 100 INJECTION SUBCUTANEOUS at 08:21

## 2023-09-22 RX ADMIN — Medication 3 MG: at 22:12

## 2023-09-22 RX ADMIN — PANTOPRAZOLE SODIUM 40 MG: 40 TABLET, DELAYED RELEASE ORAL at 19:41

## 2023-09-22 RX ADMIN — PANTOPRAZOLE SODIUM 40 MG: 40 TABLET, DELAYED RELEASE ORAL at 08:21

## 2023-09-22 RX ADMIN — LEVOTHYROXINE SODIUM 100 MCG: 0.1 TABLET ORAL at 05:55

## 2023-09-22 RX ADMIN — ASPIRIN 81 MG: 81 TABLET, COATED ORAL at 08:21

## 2023-09-22 RX ADMIN — Medication 10 ML: at 19:42

## 2023-09-22 RX ADMIN — ATORVASTATIN CALCIUM 10 MG: 10 TABLET, FILM COATED ORAL at 19:41

## 2023-09-22 ASSESSMENT — PAIN SCALES - GENERAL: PAINLEVEL_OUTOF10: 0

## 2023-09-22 NOTE — PROGRESS NOTES
Physical Therapy  Facility/Department: [address] [place] [address] - MED SURG/ORTHO  Physical Therapy Initial Assessment/treatment    Name: Ananya Arceo  : 1920  MRN: [id number]  Date of Service: 2023    Discharge Recommendations: Other (comment), 24 hour supervision or assist, Home with Home health PT (back to [facility])   PT Equipment Recommendations  Equipment Needed: No  Other: pt owns walker  If pt is unable to be seen after this session, please let this note serve as discharge summary. Please see case management note for discharge disposition. Thank you. Patient Diagnosis(es): The primary encounter diagnosis was Fall, initial encounter. Diagnoses of Injury of head, initial encounter, Laceration of scalp, initial encounter, Skin tear of right forearm without complication, initial encounter, and Elevated troponin were also pertinent to this visit. Past Medical History:  has a past medical history of Thyroid disease and TIA (transient ischemic attack). Past Surgical History:  has a past surgical history that includes Cataract removal and Hysterectomy. Assessment   Body Structures, Functions, Activity Limitations Requiring Skilled Therapeutic Intervention: Decreased functional mobility ; Decreased strength;Decreased safe awareness;Decreased cognition;Decreased balance;Decreased posture  Assessment: Pt presents to [facility] for elevated troponin and reports recent falls. Pt reports baseline of living in [address] and having extra assistance for bathing and activities around the apartment (through [facility]). Pt reports using walker PRN around apartment. Pt is questionable historian and displays impulsive behavior. Pt presents below baseline for PT eval with decreased strength, decreased endurance, and decreased mobility. Pt would continue to benefit from skilled PT to aid in the above deficits and a safe DC back to [facility] with 24 hr A when medically appropriate.  Pt more appropriate to be seen as co-tx due to [facility] Co-treatment   Time In       1054   Time Out       1117   Minutes       23   Timed Code Treatment Minutes: 13 Minutes (10 min eval)       Krystyna Cadet, PT

## 2023-09-22 NOTE — ED NOTES
Pt resting on cot in position of comfort. Respirations regular. Airway intact. GCS 15. Pt holding conversation with this RN but is very [place].   0 s/s of distress. Awaiting further orders. Will continue to monitor.         Keri Alex RN  09/21/23 2028

## 2023-09-22 NOTE — CARE COORDINATION
Referral placed to [facility].  Electronically signed by Britni Gomez RN on 9/22/2023 at 4:15 PM

## 2023-09-22 NOTE — PROGRESS NOTES
Occupational Therapy  Facility/Department: [facility] - MED SURG/ORTHO  Occupational Therapy Initial Assessment    Name: Rocky Walters  : 1920  MRN: [id number]  Date of Service: 2023    Discharge Recommendations:  24 hour supervision or assist, Home with Home health OT  OT Equipment Recommendations  Equipment Needed: No     If pt is unable to be seen after this session, please let this note serve as discharge summary. Please see case management note for discharge disposition. Thank you. Patient Diagnosis(es): The primary encounter diagnosis was Fall, initial encounter. Diagnoses of Injury of head, initial encounter, Laceration of scalp, initial encounter, Skin tear of right forearm without complication, initial encounter, and Elevated troponin were also pertinent to this visit. Past Medical History:  has a past medical history of Thyroid disease and TIA (transient ischemic attack). Past Surgical History:  has a past surgical history that includes Cataract removal and Hysterectomy. Assessment   Performance deficits / Impairments: Decreased functional mobility ; Decreased ADL status; Decreased posture;Decreased endurance;Decreased ROM; Decreased balance;Decreased strength;Decreased safe awareness;Decreased cognition  Assessment: Pt is [address] [place] female presenting from [address] [place] [address], with fall resulting in cranial laceration and R forearm skin tear. Pt has home care and requires assistance with bathing tasks, and is IND with mobility with RW (per pt report, however pt is a questionable historian 2/2 cognitive deficits). Pt is functioning below baseline, requiring min A for ambulation to/from bathroom with RW and CGA for functional/ADL transfers. She was able to perform LB dressing and toileting with CGA. Pt is limited by weakness, fatigue, balance, cognition, safety awareness, and activity tolerance and will benefit from acute OT at this time.  Recommend return to assisted living with 24hr Feet  Assistive Device: Walker, rolling  Wheelchair Management  Wheelchair Management: No     AROM: Generally decreased, functional  Strength: Generally decreased, functional  Coordination: Generally decreased, functional  Tone: Normal  Sensation: Intact  ADL  Grooming: Contact guard assistance;Verbal cueing; Increased time to complete  Grooming Skilled Clinical Factors: standing at [facility] for hand hygiene at [facility], VC for locating soap  LE Dressing: Contact guard assistance  LE Dressing Skilled Clinical Factors: seated EOB to don b/l socks; seated on toilet to thread BLE into brief, standing at  to pull up over hips  Toileting: Contact guard assistance  Toileting Skilled Clinical Factors: CGA for clothing management at , seated on toilet for king hygiene              Vision  Vision: Impaired  Vision Exceptions: Wears glasses for reading  Hearing  Hearing: Exceptions to [facility]  Hearing Exceptions: Bilateral hearing aid (not currently wearing them)  Cognition  Overall Cognitive Status: Exceptions  Arousal/Alertness: Appropriate responses to stimuli  Following Commands: Follows one step commands with repetition  Attention Span: Attends with cues to redirect; Difficulty attending to directions  Memory: Decreased recall of precautions;Decreased recall of recent events;Decreased short term memory  Safety Judgement: Decreased awareness of need for assistance;Decreased awareness of need for safety  Problem Solving: Assistance required to generate solutions;Assistance required to correct errors made;Assistance required to identify errors made  Insights: Decreased awareness of deficits  Initiation: Requires cues for all  Sequencing: Requires cues for some  Orientation  Overall Orientation Status: Impaired  Orientation Level: Oriented to person;Disoriented to place; Disoriented to situation;Oriented to time                  Education Given To: Patient  Education Provided: Role of Therapy;Plan of Care;ADL Adaptive Strategies;Transfer

## 2023-09-22 NOTE — CARE COORDINATION
Case Management Assessment  Initial Evaluation    Date/Time of Evaluation: 9/22/2023 10:37 AM  Assessment Completed by: Shaq Moreno RN    If patient is discharged prior to next notation, then this note serves as note for discharge by case management. Patient Name: Hailey Kahn                   YOB: 1920  Diagnosis: Elevated troponin [R77.8]  Injury of head, initial encounter [S09.90XA]  Fall, initial encounter [W19. XXXA]  Laceration of scalp, initial encounter [S01.01XA]  Skin tear of right forearm without complication, initial encounter [S51.811A]                   Date / Time: 9/21/2023  2:45 PM    Patient Admission Status: Inpatient   Readmission Risk (Low < 19, Mod (19-27), High > 27): Readmission Risk Score: 14.7    Current PCP: Diana Lin MD  PCP verified by CM? Yes    Chart Reviewed: Yes      History Provided by:    Patient Orientation: Other (see comment) (Disoriented)    Patient Cognition: Dementia / Early Alzheimer's    Hospitalization in the last 30 days (Readmission):  No    If yes, Readmission Assessment in CM Navigator will be completed. Advance Directives:      Code Status: Full Code   Patient's Primary Decision Maker is: Named in Filippo E Kami     [facility] Reny [facility] - [phone]    Discharge Planning:    Patient lives with:  Other (Comment) ([place] w/private caregiver 24/7) Type of Home: Assisted living  Primary Care Giver: Private caregiver  Patient Support Systems include: Children, Home Care Staff   Current Financial resources: Medicare  Current community resources: Assisted Living  Current services prior to admission: Home Care            Current DME:              Type of Home Care services:  [employer]    ADLS  Prior functional level: Assistance with the following:, Bathing, Dressing, Toileting, Cooking, Housework, Shopping, Mobility  Current functional level: Assistance with the following:, Bathing, Dressing, Toileting, Feeding, Cooking, Housework, Shopping, Mobility    PT AM-PAC:   /24  OT AM-PAC:   /24    Family can provide assistance at DC: Other (comment) (Private duty caregiver)  Would you like Case Management to discuss the discharge plan with any other family members/significant others, and if so, who? Yes (Son)  Plans to Return to Present Housing: Yes  Other Identified Issues/Barriers to RETURNING to current housing: NONE  Potential Assistance needed at discharge: N/A            Potential DME:    Patient expects to discharge to: Assisted living  Plan for transportation at discharge: Family    Financial    Payor: Leelee Zee / Plan: [address] / Product Type: *No Product type* /     Does insurance require precert for SNF: Yes    Potential assistance Purchasing Medications: No  [id number]  [id number] [place] [address]  Phone: [phone] Fax: [phone]    Opal Guo MD - [address] [facility] - P [id number] - F [phone]  [address] [place] [address]  Phone: [phone] Fax: [phone]      Notes:    Factors facilitating achievement of predicted outcomes: Family support, Caregiver support, Has needed Durable Medical Equipment at home, and Home is wheelchair accessible    Barriers to discharge: Awaiting MD cerrato    Additional Case Management Notes: Pt w/hx of dementia. Son assisted w/completing assessment. Pt is from the [facility] where she has 24/7 assistance through [employer]. Son wants her to return to THE [facility] at d/c. Pt has a walker, wheelchair and a electric wheelchair. Pt is followed by IM. Anticipate d/c today or tomorrow. Will need transport. Will follow for needs as they arise.     The Plan for Transition of Care is related to the following treatment goals of Elevated troponin [R77.8]  Injury of head, initial

## 2023-09-22 NOTE — PROGRESS NOTES
09/22/23 1444   Encounter Summary   Encounter Overview/Reason  Initial Encounter   Service Provided For: Patient   Referral/Consult From: Kalpana   Last Encounter  09/22/23  (Renae Long visited and provided pastoral encouragement and support)

## 2023-09-23 VITALS
DIASTOLIC BLOOD PRESSURE: 69 MMHG | HEART RATE: 66 BPM | OXYGEN SATURATION: 95 % | WEIGHT: 98 LBS | SYSTOLIC BLOOD PRESSURE: 181 MMHG | BODY MASS INDEX: 16.31 KG/M2 | TEMPERATURE: 97.2 F | RESPIRATION RATE: 16 BRPM

## 2023-09-23 LAB
ANION GAP SERPL CALCULATED.3IONS-SCNC: 6 MMOL/L (ref 3–16)
BUN SERPL-MCNC: 15 MG/DL (ref 7–20)
CALCIUM SERPL-MCNC: 8.7 MG/DL (ref 8.3–10.6)
CHLORIDE SERPL-SCNC: 102 MMOL/L (ref 99–110)
CO2 SERPL-SCNC: 27 MMOL/L (ref 21–32)
CREAT SERPL-MCNC: 0.6 MG/DL (ref 0.6–1.2)
DEPRECATED RDW RBC AUTO: 15.6 % (ref 12.4–15.4)
GFR SERPLBLD CREATININE-BSD FMLA CKD-EPI: >60 ML/MIN/{1.73_M2}
GLUCOSE SERPL-MCNC: 93 MG/DL (ref 70–99)
HCT VFR BLD AUTO: 33.6 % (ref 36–48)
HGB BLD-MCNC: 11.2 G/DL (ref 12–16)
MAGNESIUM SERPL-MCNC: 1.9 MG/DL (ref 1.8–2.4)
MCH RBC QN AUTO: 31.9 PG (ref 26–34)
MCHC RBC AUTO-ENTMCNC: 33.2 G/DL (ref 31–36)
MCV RBC AUTO: 96 FL (ref 80–100)
PHOSPHATE SERPL-MCNC: 3 MG/DL (ref 2.5–4.9)
PLATELET # BLD AUTO: 233 K/UL (ref 135–450)
PMV BLD AUTO: 7.4 FL (ref 5–10.5)
POTASSIUM SERPL-SCNC: 4 MMOL/L (ref 3.5–5.1)
RBC # BLD AUTO: 3.5 M/UL (ref 4–5.2)
SODIUM SERPL-SCNC: 135 MMOL/L (ref 136–145)
WBC # BLD AUTO: 3.4 K/UL (ref 4–11)

## 2023-09-23 PROCEDURE — 80048 BASIC METABOLIC PNL TOTAL CA: CPT

## 2023-09-23 PROCEDURE — 84100 ASSAY OF PHOSPHORUS: CPT

## 2023-09-23 PROCEDURE — 36415 COLL VENOUS BLD VENIPUNCTURE: CPT

## 2023-09-23 PROCEDURE — 83735 ASSAY OF MAGNESIUM: CPT

## 2023-09-23 PROCEDURE — 2580000003 HC RX 258: Performed by: STUDENT IN AN ORGANIZED HEALTH CARE EDUCATION/TRAINING PROGRAM

## 2023-09-23 PROCEDURE — 85027 COMPLETE CBC AUTOMATED: CPT

## 2023-09-23 PROCEDURE — 6370000000 HC RX 637 (ALT 250 FOR IP): Performed by: STUDENT IN AN ORGANIZED HEALTH CARE EDUCATION/TRAINING PROGRAM

## 2023-09-23 PROCEDURE — 6360000002 HC RX W HCPCS: Performed by: STUDENT IN AN ORGANIZED HEALTH CARE EDUCATION/TRAINING PROGRAM

## 2023-09-23 RX ADMIN — LEVOTHYROXINE SODIUM 100 MCG: 0.1 TABLET ORAL at 06:18

## 2023-09-23 RX ADMIN — ENOXAPARIN SODIUM 30 MG: 100 INJECTION SUBCUTANEOUS at 09:10

## 2023-09-23 RX ADMIN — ACETAMINOPHEN 650 MG: 325 TABLET ORAL at 14:34

## 2023-09-23 RX ADMIN — Medication 10 ML: at 09:11

## 2023-09-23 RX ADMIN — ASPIRIN 81 MG: 81 TABLET, COATED ORAL at 09:10

## 2023-09-23 RX ADMIN — PANTOPRAZOLE SODIUM 40 MG: 40 TABLET, DELAYED RELEASE ORAL at 09:10

## 2023-09-23 NOTE — PLAN OF CARE
Problem: Discharge Planning  Goal: Discharge to home or other facility with appropriate resources  9/22/2023 2126 by Liliana Kearns LPN  Outcome: Progressing  Flowsheets (Taken 9/22/2023 1939)  Discharge to home or other facility with appropriate resources: Identify barriers to discharge with patient and caregiver     Problem: Safety - Adult  Goal: Free from fall injury  9/22/2023 2126 by Liliana Kearns LPN  Outcome: Progressing     Problem: Skin/Tissue Integrity  Goal: Absence of new skin breakdown  Description: 1. Monitor for areas of redness and/or skin breakdown  2. Assess vascular access sites hourly  3. Every 4-6 hours minimum:  Change oxygen saturation probe site  4. Every 4-6 hours:  If on nasal continuous positive airway pressure, respiratory therapy assess nares and determine need for appliance change or resting period.   9/22/2023 2126 by Liliana Kearns LPN  Outcome: Progressing     Problem: ABCDS Injury Assessment  Goal: Absence of physical injury  9/22/2023 2126 by Liliana Kearns LPN  Outcome: Progressing

## 2023-09-23 NOTE — PLAN OF CARE
Problem: Discharge Planning  Goal: Discharge to home or other facility with appropriate resources  9/23/2023 1000 by Ashley Zhang RN  Outcome: Progressing  9/22/2023 2126 by Joann Branch LPN  Outcome: Progressing    Problem: Safety - Adult  Goal: Free from fall injury  9/23/2023 1000 by Ashley Zhang RN  Outcome: Progressing    Problem: Skin/Tissue Integrity  Goal: Absence of new skin breakdown  Description: 1. Monitor for areas of redness and/or skin breakdown  2. Assess vascular access sites hourly  3. Every 4-6 hours minimum:  Change oxygen saturation probe site  4. Every 4-6 hours:  If on nasal continuous positive airway pressure, respiratory therapy assess nares and determine need for appliance change or resting period.   9/23/2023 1000 by Ashley Zhang RN  Outcome: Progressing    Problem: ABCDS Injury Assessment  Goal: Absence of physical injury  9/23/2023 1000 by Ashley Zhang RN  Outcome: Progressing

## 2023-09-23 NOTE — DISCHARGE SUMMARY
PT discharged home with son. Case management to follow up with home care. Discharge signed.  All belongings sent home with PT.

## 2023-09-23 NOTE — CARE COORDINATION
CASE MANAGEMENT DISCHARGE SUMMARY      Discharge to: [facility] with [facility] [employer]:    Family/car: yes       Confirmed discharge plan with:     Patient: yes per RN     Family:  yes son is transporting     RN, name: Brandon Joey BALLARD    Note: Discharging nurse to complete ODALYS, reconcile AVS, and place final copy with patient's discharge packet. RN to ensure that written prescriptions for  Level II medications are sent with patient to the facility as per protocol.

## 2023-09-23 NOTE — PLAN OF CARE
Problem: Discharge Planning  Goal: Discharge to home or other facility with appropriate resources  9/23/2023 1340 by Jennifer Barajas RN  Outcome: Adequate for Discharge  9/23/2023 1000 by Jennifer Barajas RN  Outcome: Progressing     Problem: Safety - Adult  Goal: Free from fall injury  9/23/2023 1340 by Jennifer Barajas RN  Outcome: Adequate for Discharge  9/23/2023 1000 by Jennifer Barajas RN  Outcome: Progressing     Problem: Skin/Tissue Integrity  Goal: Absence of new skin breakdown  Description: 1. Monitor for areas of redness and/or skin breakdown  2. Assess vascular access sites hourly  3. Every 4-6 hours minimum:  Change oxygen saturation probe site  4. Every 4-6 hours:  If on nasal continuous positive airway pressure, respiratory therapy assess nares and determine need for appliance change or resting period.   9/23/2023 1340 by Jennifer Barajas RN  Outcome: Adequate for Discharge  9/23/2023 1000 by Jennifer Barajas RN  Outcome: Progressing     Problem: ABCDS Injury Assessment  Goal: Absence of physical injury  9/23/2023 1340 by Jennifer Barajas RN  Outcome: Adequate for Discharge  9/23/2023 1000 by Jennifer Barajas RN  Outcome: Progressing

## 2024-05-08 ENCOUNTER — HOSPITAL ENCOUNTER (EMERGENCY)
Age: 89
Discharge: HOME | End: 2024-05-08
Payer: MEDICARE

## 2024-05-08 VITALS
WEIGHT: 105 LBS | OXYGEN SATURATION: 94 % | HEART RATE: 72 BPM | RESPIRATION RATE: 14 BRPM | TEMPERATURE: 97.7 F | BODY MASS INDEX: 15.91 KG/M2 | DIASTOLIC BLOOD PRESSURE: 72 MMHG | SYSTOLIC BLOOD PRESSURE: 172 MMHG | HEIGHT: 68 IN
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
## 2024-05-08 DIAGNOSIS — M54.6 ACUTE MIDLINE THORACIC BACK PAIN: ICD-10-CM

## 2024-05-08 PROCEDURE — 99284 EMERGENCY DEPT VISIT MOD MDM: CPT

## 2024-05-08 PROCEDURE — 6370000000 HC RX 637 (ALT 250 FOR IP): Performed by: PHYSICIAN ASSISTANT

## 2024-05-08 RX ORDER — LIDOCAINE 4 G/G
1 PATCH TOPICAL ONCE
Status: DISCONTINUED | OUTPATIENT
Start: 2024-05-08 | End: 2024-05-08 | Stop reason: HOSPADM

## 2024-05-08 RX ORDER — ACETAMINOPHEN 500 MG
500 TABLET ORAL ONCE
Status: COMPLETED | OUTPATIENT
Start: 2024-05-08 | End: 2024-05-08

## 2024-05-08 RX ADMIN — ACETAMINOPHEN 500 MG: 500 TABLET ORAL at 11:23

## 2024-05-08 ASSESSMENT — ENCOUNTER SYMPTOMS
BACK PAIN: 1
COUGH: 0
SHORTNESS OF BREATH: 0
EYE REDNESS: 0
SORE THROAT: 0
SINUS PRESSURE: 0
SINUS PAIN: 0
CHEST TIGHTNESS: 0
RHINORRHEA: 0
CONSTIPATION: 0
DIARRHEA: 0
EYE DISCHARGE: 0
VOMITING: 0
NAUSEA: 0
ABDOMINAL PAIN: 0

## 2024-05-08 ASSESSMENT — PAIN - FUNCTIONAL ASSESSMENT: PAIN_FUNCTIONAL_ASSESSMENT: ADULT NONVERBAL PAIN SCALE (NPVS)

## 2024-05-08 NOTE — ED PROVIDER NOTES
[facility]  ED  EMERGENCY DEPARTMENT ENCOUNTER        Pt Name: Arabella Faust  MRN: [id number]  Birthdate 12/18/1920  Date of evaluation: 5/8/2024  Provider: Sharla Richardson PA-C  PCP: Alonzo Waddell MD  Note Started: 10:42 AM EDT 5/8/24      ERIKA. I have evaluated this patient.        CHIEF COMPLAINT       Chief Complaint   Patient presents with    Fall     Pt arrived to er after fall at [facility]. Per squad pt got dizzy fell. Injury to back and top of head.. son called and stated he didn't want pt transported to er due to dnrcc and hospice       HISTORY OF PRESENT ILLNESS: 1 or more Elements     History from : EMS, caretaker     Limitations to history : AUBREY Faust is a 89 y.o. female who presents via EMS sent in from skilled nursing facility after patient had a fall.  Patient is in palliative/hospice care.  And we are advised that family did not want her transported to our facility.  Patient complains about having back pain in her thoracic spine.  There is no obvious evidence of trauma.  Patient is hard of hearing.  Pts son at bedside I advised the patient is a hospice patient and he did not want imaging or any kind of workup, he had requested the patient not be transported to our facility, but EMS ignore that request.     Nursing Notes were all reviewed and agreed with or any disagreements were addressed in the HPI.    REVIEW OF SYSTEMS :      Review of Systems   Constitutional:  Negative for chills and fever.   HENT: Negative.  Negative for congestion, rhinorrhea, sinus pressure, sinus pain and sore throat.    Eyes:  Negative for discharge, redness and visual disturbance.   Respiratory:  Negative for cough, chest tightness and shortness of breath.    Cardiovascular:  Negative for chest pain and palpitations.   Gastrointestinal:  Negative for abdominal pain, constipation, diarrhea, nausea and vomiting.   Genitourinary:  Negative for difficulty urinating, dysuria and

## 2024-05-08 NOTE — CARE COORDINATION
DNR obtained from [facility], added to medical record and scanned into chart. Electronically signed by JUANA Bardales on 5/8/2024 at 12:24 PM

## 2024-05-08 NOTE — ED NOTES
Written and verbal discharge provided to patient and family member.  GCS 14 (baseline confusion), no acute signs or symptoms of distress. Patient discharged at this time with CMT on stretcher.

## 2024-05-08 NOTE — CARE COORDINATION
Referred to patient per registration.  Per record, patient lives at [facility] and is [facility] patient . Son did not want patient transported.  Call placed to [facility].  They told PA to contact son.  Message was left and await response to see what son wants done.  [facility] asked for DNR as it is not on file.  They are looking and will fax.  Call placed to [facility] and [facility] who do not have copy of DNR.  MD and RN updated.  Await son.  Electronically signed by JUANA Bardales on 5/8/2024 at 11:26 AM

## 2024-05-23 ENCOUNTER — HOSPITAL ENCOUNTER (EMERGENCY)
Age: 89
Discharge: NURSING, REHAB OR LONG-TERM CARE | End: 2024-05-23
Attending: EMERGENCY MEDICINE
Payer: MEDICARE

## 2024-05-23 ENCOUNTER — APPOINTMENT (OUTPATIENT)
Dept: CT IMAGING | Age: 89
End: 2024-05-23
Payer: MEDICARE

## 2024-05-23 ENCOUNTER — APPOINTMENT (OUTPATIENT)
Dept: GENERAL RADIOLOGY | Age: 89
End: 2024-05-23
Payer: MEDICARE

## 2024-05-23 VITALS
SYSTOLIC BLOOD PRESSURE: 110 MMHG | RESPIRATION RATE: 16 BRPM | DIASTOLIC BLOOD PRESSURE: 71 MMHG | OXYGEN SATURATION: 94 % | HEART RATE: 70 BPM | TEMPERATURE: 97.6 F

## 2024-05-23 DIAGNOSIS — S22.019A CLOSED FRACTURE OF FIRST THORACIC VERTEBRA, UNSPECIFIED FRACTURE MORPHOLOGY, INITIAL ENCOUNTER (HCC): ICD-10-CM

## 2024-05-23 DIAGNOSIS — S29.019A THORACIC MYOFASCIAL STRAIN, INITIAL ENCOUNTER: Primary | ICD-10-CM

## 2024-05-23 PROCEDURE — 72128 CT CHEST SPINE W/O DYE: CPT

## 2024-05-23 PROCEDURE — 99284 EMERGENCY DEPT VISIT MOD MDM: CPT

## 2024-05-23 PROCEDURE — 6370000000 HC RX 637 (ALT 250 FOR IP): Performed by: EMERGENCY MEDICINE

## 2024-05-23 RX ORDER — HYDROCODONE BITARTRATE AND ACETAMINOPHEN 5; 325 MG/1; MG/1
1 TABLET ORAL ONCE
Status: COMPLETED | OUTPATIENT
Start: 2024-05-23 | End: 2024-05-23

## 2024-05-23 RX ADMIN — HYDROCODONE BITARTRATE AND ACETAMINOPHEN 1 TABLET: 5; 325 TABLET ORAL at 01:39

## 2024-05-23 ASSESSMENT — PAIN SCALES - GENERAL: PAINLEVEL_OUTOF10: 3

## 2024-05-23 ASSESSMENT — PAIN - FUNCTIONAL ASSESSMENT: PAIN_FUNCTIONAL_ASSESSMENT: NONE - DENIES PAIN

## 2024-05-23 ASSESSMENT — PAIN DESCRIPTION - ORIENTATION: ORIENTATION: MID

## 2024-05-23 ASSESSMENT — PAIN DESCRIPTION - LOCATION: LOCATION: BACK

## 2024-05-23 NOTE — ED PROVIDER NOTES
She did not get a dose at midnight for which she would have been scheduled for.  They advised that patient requested to be seen in the emergency department this is why she was transported.  They are also unsure whether family was contacted about her transfer to the emergency department.  I did call and update Yuridia from [facility] about the CAT scan which showed acute to subacute compression fracture of T1, subacute compression fracture at T4.  Patient will be provided with spine surgery referral however is unclear whether family will want to pursue any additional management of this due to her goals of care and current hospice status.    Social Determinants : None    Patient's care impacted by chronic condition(s):   Past Medical History:   Diagnosis Date    Thyroid disease     TIA (transient ischemic attack) 1980's         Records Reviewed : None    Clinical information obtained from an independent historian. History obtained from or confirmed by EMS    Disposition Considerations (include 1 Tests not done, Shared Decision Making, Pt Expectation of Test or Tx.):     The patient will be discharged from the emergency department. The patient was counseled on their diagnosis and any medications prescribed. They were advised on the need for PCP followup. They were counseled on the need to return to the emergency department if any of their symptoms were to worsen, change or have any other concerns. Discharged in stable condition.       I am the Primary Clinician of Record.    FINAL IMPRESSION      1. Thoracic myofascial strain, initial encounter    2. Closed fracture of first thoracic vertebra, unspecified fracture morphology, initial encounter ([facility])          DISPOSITION/PLAN     DISPOSITION Decision To Discharge 05/23/2024 02:32:13 AM      PATIENT REFERRED TO:  Alonzo Waddell MD  [address] [facility] [id number] [phone]          Ankush Peter MD  [address] [facility]

## 2024-05-23 NOTE — ED NOTES
Patient reports middle back pain from fall (a few minutes ago per patient) Patient report falling in the bathroom Denies LOC. Patient reports fell on the floor. Patient reports pain quality as \"a little\" better when lays still, \"hurts to move\"

## 2024-05-23 NOTE — ED NOTES
Report to strategic transport at this time. Patient dressed in bra and gown and transferred to transport cot. Patient is alert and oriented to person and place.

## 2024-05-23 NOTE — DISCHARGE INSTRUCTIONS
The CAT scan did show subacute to acute appearing compression fracture at T1 and T4 as well as chronic compression fractures at T2 and T3.  She has been made referral to spine doctor.  Continue with Norco and lidocaine patches

## 2024-08-22 ENCOUNTER — APPOINTMENT (OUTPATIENT)
Dept: CT IMAGING | Age: 89
End: 2024-08-22
Payer: MEDICARE

## 2024-08-22 ENCOUNTER — HOSPITAL ENCOUNTER (EMERGENCY)
Age: 89
Discharge: HOME | End: 2024-08-22
Attending: EMERGENCY MEDICINE
Payer: MEDICARE

## 2024-08-22 ENCOUNTER — APPOINTMENT (OUTPATIENT)
Dept: GENERAL RADIOLOGY | Age: 89
End: 2024-08-22
Payer: MEDICARE

## 2024-08-22 VITALS
HEART RATE: 74 BPM | HEIGHT: 64 IN | SYSTOLIC BLOOD PRESSURE: 146 MMHG | WEIGHT: 98 LBS | RESPIRATION RATE: 16 BRPM | TEMPERATURE: 97.8 F | DIASTOLIC BLOOD PRESSURE: 68 MMHG | OXYGEN SATURATION: 95 % | BODY MASS INDEX: 16.73 KG/M2
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
## 2024-08-22 DIAGNOSIS — N39.0 URINARY TRACT INFECTION WITHOUT HEMATURIA, SITE UNSPECIFIED: Primary | ICD-10-CM

## 2024-08-22 LAB
AMORPH SED URNS QL MICRO: ABNORMAL /HPF
ANION GAP SERPL CALCULATED.3IONS-SCNC: 7 MMOL/L (ref 3–16)
BACTERIA URNS QL MICRO: ABNORMAL /HPF
BASOPHILS # BLD: 0 K/UL (ref 0–0.2)
BASOPHILS NFR BLD: 0.6 %
BILIRUB UR QL STRIP.AUTO: NEGATIVE
BUN SERPL-MCNC: 26 MG/DL (ref 7–20)
CALCIUM SERPL-MCNC: 8.9 MG/DL (ref 8.3–10.6)
CHLORIDE SERPL-SCNC: 102 MMOL/L (ref 99–110)
CLARITY UR: ABNORMAL
CO2 SERPL-SCNC: 22 MMOL/L (ref 21–32)
COLOR UR: YELLOW
CREAT SERPL-MCNC: 0.6 MG/DL (ref 0.6–1.2)
DEPRECATED RDW RBC AUTO: 16.8 % (ref 12.4–15.4)
EOSINOPHIL # BLD: 0.2 K/UL (ref 0–0.6)
EOSINOPHIL NFR BLD: 5.2 %
EPI CELLS #/AREA URNS HPF: ABNORMAL /HPF (ref 0–5)
GFR SERPLBLD CREATININE-BSD FMLA CKD-EPI: 78 ML/MIN/{1.73_M2}
GLUCOSE SERPL-MCNC: 85 MG/DL (ref 70–99)
GLUCOSE UR STRIP.AUTO-MCNC: NEGATIVE MG/DL
HCT VFR BLD AUTO: 39.7 % (ref 36–48)
HGB BLD-MCNC: 12.6 G/DL (ref 12–16)
HGB UR QL STRIP.AUTO: ABNORMAL
HYALINE CASTS #/AREA URNS LPF: ABNORMAL /LPF (ref 0–2)
KETONES UR STRIP.AUTO-MCNC: NEGATIVE MG/DL
LEUKOCYTE ESTERASE UR QL STRIP.AUTO: ABNORMAL
LYMPHOCYTES # BLD: 0.7 K/UL (ref 1–5.1)
LYMPHOCYTES NFR BLD: 15.1 %
MCH RBC QN AUTO: 32.4 PG (ref 26–34)
MCHC RBC AUTO-ENTMCNC: 31.9 G/DL (ref 31–36)
MCV RBC AUTO: 101.7 FL (ref 80–100)
MONOCYTES # BLD: 0.3 K/UL (ref 0–1.3)
MONOCYTES NFR BLD: 5.9 %
MUCOUS THREADS #/AREA URNS LPF: ABNORMAL /LPF
NEUTROPHILS # BLD: 3.4 K/UL (ref 1.7–7.7)
NEUTROPHILS NFR BLD: 73.2 %
NITRITE UR QL STRIP.AUTO: POSITIVE
PH UR STRIP.AUTO: 6 [PH] (ref 5–8)
PLATELET # BLD AUTO: 237 K/UL (ref 135–450)
PMV BLD AUTO: 7.7 FL (ref 5–10.5)
POTASSIUM SERPL-SCNC: 5 MMOL/L (ref 3.5–5.1)
PROT UR STRIP.AUTO-MCNC: ABNORMAL MG/DL
RBC # BLD AUTO: 3.91 M/UL (ref 4–5.2)
RBC #/AREA URNS HPF: ABNORMAL /HPF (ref 0–4)
RENAL EPI CELLS #/AREA UR COMP ASSIST: ABNORMAL /HPF (ref 0–1)
SODIUM SERPL-SCNC: 131 MMOL/L (ref 136–145)
SP GR UR STRIP.AUTO: >=1.03 (ref 1–1.03)
UA COMPLETE W REFLEX CULTURE PNL UR: YES
UA DIPSTICK W REFLEX MICRO PNL UR: YES
URN SPEC COLLECT METH UR: ABNORMAL
UROBILINOGEN UR STRIP-ACNC: 0.2 E.U./DL
WBC # BLD AUTO: 4.6 K/UL (ref 4–11)
WBC #/AREA URNS HPF: ABNORMAL /HPF (ref 0–5)

## 2024-08-22 PROCEDURE — 70450 CT HEAD/BRAIN W/O DYE: CPT

## 2024-08-22 PROCEDURE — 81001 URINALYSIS AUTO W/SCOPE: CPT

## 2024-08-22 PROCEDURE — 80048 BASIC METABOLIC PNL TOTAL CA: CPT

## 2024-08-22 PROCEDURE — 87086 URINE CULTURE/COLONY COUNT: CPT

## 2024-08-22 PROCEDURE — 99284 EMERGENCY DEPT VISIT MOD MDM: CPT

## 2024-08-22 PROCEDURE — 71045 X-RAY EXAM CHEST 1 VIEW: CPT

## 2024-08-22 PROCEDURE — 72125 CT NECK SPINE W/O DYE: CPT

## 2024-08-22 PROCEDURE — 85025 COMPLETE CBC W/AUTO DIFF WBC: CPT

## 2024-08-22 PROCEDURE — 73030 X-RAY EXAM OF SHOULDER: CPT

## 2024-08-22 PROCEDURE — 87077 CULTURE AEROBIC IDENTIFY: CPT

## 2024-08-22 PROCEDURE — 87186 SC STD MICRODIL/AGAR DIL: CPT

## 2024-08-22 RX ORDER — DOXYCYCLINE HYCLATE 100 MG
100 TABLET ORAL ONCE
Status: DISCONTINUED | OUTPATIENT
Start: 2024-08-22 | End: 2024-08-22 | Stop reason: HOSPADM

## 2024-08-22 RX ORDER — DOXYCYCLINE HYCLATE 100 MG
100 TABLET ORAL 2 TIMES DAILY
Qty: 14 TABLET | Refills: 0 | Status: SHIPPED | OUTPATIENT
Start: 2024-08-22 | End: 2024-08-29

## 2024-08-22 ASSESSMENT — PAIN SCALES - GENERAL: PAINLEVEL_OUTOF10: 5

## 2024-08-22 ASSESSMENT — PAIN DESCRIPTION - ORIENTATION: ORIENTATION: UPPER

## 2024-08-22 ASSESSMENT — ENCOUNTER SYMPTOMS
SORE THROAT: 0
EYE REDNESS: 0
SHORTNESS OF BREATH: 0
ABDOMINAL PAIN: 0
RHINORRHEA: 0
VOMITING: 0
BACK PAIN: 0

## 2024-08-22 ASSESSMENT — PAIN DESCRIPTION - PAIN TYPE: TYPE: ACUTE PAIN

## 2024-08-22 ASSESSMENT — PAIN - FUNCTIONAL ASSESSMENT
PAIN_FUNCTIONAL_ASSESSMENT: 0-10
PAIN_FUNCTIONAL_ASSESSMENT: NONE - DENIES PAIN

## 2024-08-22 ASSESSMENT — PAIN DESCRIPTION - LOCATION: LOCATION: HEAD

## 2024-08-22 NOTE — ED PROVIDER NOTES
- Abnormal; Notable for the following components:       Result Value    RBC 3.91 (*)     .7 (*)     RDW 16.8 (*)     Lymphocytes Absolute 0.7 (*)     All other components within normal limits   BASIC METABOLIC PANEL W/ REFLEX TO MG FOR LOW K - Abnormal; Notable for the following components:    Sodium 131 (*)     BUN 26 (*)     All other components within normal limits   URINALYSIS WITH REFLEX TO CULTURE - Abnormal; Notable for the following components:    Clarity, UA SL CLOUDY (*)     Blood, Urine TRACE-INTACT (*)     Protein, UA TRACE (*)     Nitrite, Urine POSITIVE (*)     Leukocyte Esterase, Urine MODERATE (*)     All other components within normal limits   MICROSCOPIC URINALYSIS - Abnormal; Notable for the following components:    Mucus, UA 1+ (*)     WBC, UA 10-20 (*)     Bacteria, UA 1+ (*)     All other components within normal limits   CULTURE, URINE      When ordered only abnormal lab results are displayed. All other labs were within normal range or not returned as of this dictation.     RADIOLOGY:      Non-plain film images such as CT, Ultrasound and MRI are read by the radiologist. Plain radiographic images are visualized and preliminarily interpreted by the ED Provider with the below findings:   Interpretation per the Radiologist below, if available at the time of this note:  CT HEAD WO CONTRAST   Final Result   No acute intracranial abnormality.         CT CERVICAL SPINE WO CONTRAST   Final Result   Stable CT the cervical spine with no acute traumatic abnormality.         XR CHEST PORTABLE   Final Result   No acute disease         XR SHOULDER RIGHT (MIN 2 VIEWS)   Final Result   No acute osseous abnormality.                  EKG:      PROCEDURES   Unless otherwise noted below, none     CRITICAL CARE TIME   I personally saw the patient and independently provided 15 minutes of non-concurrent critical care out of the total shared critical care time excluding separately billable

## 2024-08-23 NOTE — ED PROVIDER NOTES
not returned as of this dictation.    EKG: When ordered, EKG's are interpreted by the Emergency Department Physician in the absence of a cardiologist.  Please see their note for interpretation of EKG.    RADIOLOGY:   Non-plain film images such as CT, Ultrasound and MRI are read by the radiologist. Plain radiographic images are visualized and preliminarily interpreted by the ED Provider with the below findings:        Interpretation per the Radiologist below, if available at the time of this note:    CT HEAD WO CONTRAST   Final Result   No acute intracranial abnormality.         CT CERVICAL SPINE WO CONTRAST   Final Result   Stable CT the cervical spine with no acute traumatic abnormality.         XR CHEST PORTABLE   Final Result   No acute disease         XR SHOULDER RIGHT (MIN 2 VIEWS)   Final Result   No acute osseous abnormality.           CT CERVICAL SPINE WO CONTRAST    Result Date: 8/22/2024  EXAMINATION: CT OF THE CERVICAL SPINE WITHOUT CONTRAST 8/22/2024 1:09 pm TECHNIQUE: CT of the cervical spine was performed without the administration of intravenous contrast. Multiplanar reformatted images are provided for review. Automated exposure control, iterative reconstruction, and/or weight based adjustment of the mA/kV was utilized to reduce the radiation dose to as low as reasonably achievable. COMPARISON: CT dated 09/21/2023 HISTORY: ORDERING SYSTEM PROVIDED HISTORY: Fall, accidental TECHNOLOGIST PROVIDED HISTORY: Reason for exam:->Fall, accidental Decision Support Exception - unselect if not a suspected or confirmed emergency medical condition->Emergency Medical Condition (MA) Reason for Exam: fall today, pt is poor historian due to dementia FINDINGS: BONES/ALIGNMENT: There is no acute fracture or traumatic malalignment.  The bones are diffusely demineralized. DEGENERATIVE CHANGES: No significant change in significantly lordotic curvature of the cervical spine.  In associated multilevel facet arthrosis. SOFT

## 2024-08-24 LAB
BACTERIA UR CULT: ABNORMAL
ORGANISM: ABNORMAL

## 2025-07-25 NOTE — CARE COORDINATION
Case Management Assessment  Initial Evaluation    Date/Time of Evaluation: 1/30/2023 4:03 PM  Assessment Completed by: JUANA Blanca    If patient is discharged prior to next notation, then this note serves as note for discharge by case management. Patient Name: Belinda Erwin                   YOB: 1920  Diagnosis: Slurred speech [R47.81]                   Date / Time: 1/30/2023 12:44 PM    Patient Admission Status: Inpatient   Readmission Risk (Low < 19, Mod (19-27), High > 27): No data recorded  Current PCP: Zahraa Durant MD  PCP verified by CM? (P) Yes    Chart Reviewed: Yes      History Provided by: (P) Child/Family  Patient Orientation: (P) Sedated    Patient Cognition: (P) Alert    Hospitalization in the last 30 days (Readmission):  No    If yes, Readmission Assessment in  Navigator will be completed.     Advance Directives:      Code Status: Prior   Patient's Primary Decision Maker is: (P) Legal Next of Kin    Primary Decision MakerBeverly Leora  Child - [phone]    Discharge Planning:    Patient lives with: (P) Alone Type of Home: (P) [place]  Primary Care Giver: (P) Self  Patient Support Systems include: (P) Children, Homemaker   Current Financial resources: (P) None  Current community resources: (P) ECF/Home Care  Current services prior to admission: (P) [facility], Private Duty Homecare            Current DME: (P) Walker            Type of Home Care services:       ADLS  Prior functional level: (P) Assistance with the following:, Bathing  Current functional level: (P) Assistance with the following:, Bathing, Dressing, Toileting, Mobility, Shopping, Housework, Cooking    PT AM-PAC:   /24  OT AM-PAC:   /24    Family can provide assistance at DC: (P) Yes  Would you like Case Management to discuss the discharge plan with any other family members/significant others, and if so, who? (P) Yes  Plans to Return to Present Housing: (P) No  Other Patient was seen Wed. At [facility] at 3:45p - No medication has been received form dr TERESA.   Identified Issues/Barriers to RETURNING to current housing: weakness, debility  Potential Assistance needed at discharge: (P) Enrique Wilson            Potential DME:    Patient expects to discharge to:    Plan for transportation at discharge:      Financial    Payor: Ezio Pleitez Vicki / Plan: [place] / Product Type: *No Product type* /     Does insurance require precert for SNF: Yes    Potential assistance Purchasing Medications: (P) No  Meds-to-Beds request:        [facility] [id number] [facility]  [address]  Phone: [phone] Fax: [phone]    Shobha Cervantes MD - [address] - P [phone] - F [phone]  [address] [place] [address]  Phone: [phone] Fax: [phone]      Notes:    Factors facilitating achievement of predicted outcomes: Family support, Motivated, Cooperative, Pleasant, Has needed Durable Medical Equipment at home, and Has homemaker services    Barriers to discharge: Cognitive deficit, Upper extremity weakness, Lower extremity weakness, and Medical complications    Additional Case Management Notes: Referred to patient for d/c planning. Spoke to patient and son. Patient is 8 year old female admitted for AMS. Patient [facility] at [facility]. Per son, patient is usually independent in  [facility] except for bathing. Discussed SNF vs. Home care on d/c. Patient has been to [facility] in past.  List of providers in network with insurance provided including: [facility], [facility], and [facility]. Case management to follow for d/c needs. The Plan for Transition of Care is related to the following treatment goals of Slurred speech [K73.66]    IF APPLICABLE: The Patient and/or patient representative Ruth Cantu and her family were provided with a choice of provider and agrees with the discharge plan.  Freedom of choice list with basic dialogue that supports the patient's individualized plan of care/goals and shares the quality data associated with the providers was provided to:     Patient Representative Name:       The Patient and/or Patient Representative Agree with the Discharge Plan?       JUANA London, MYRNA-S  Case Management Department  Ph: [phone] Fax: [phone]